# Patient Record
Sex: FEMALE | Race: WHITE | NOT HISPANIC OR LATINO | Employment: FULL TIME | URBAN - METROPOLITAN AREA
[De-identification: names, ages, dates, MRNs, and addresses within clinical notes are randomized per-mention and may not be internally consistent; named-entity substitution may affect disease eponyms.]

---

## 2017-01-11 DIAGNOSIS — F51.01 PRIMARY INSOMNIA: Primary | ICD-10-CM

## 2017-01-12 RX ORDER — ZOLPIDEM TARTRATE 5 MG/1
TABLET ORAL
Qty: 30 TABLET | Refills: 0 | OUTPATIENT
Start: 2017-01-12

## 2017-01-12 NOTE — TELEPHONE ENCOUNTER
Medication was refilled on 12/15/2016 #30 with 3 additional refills.    zolpidem (AMBIEN) 10 MG tablet 30 tablet 3 12/15/2016

## 2017-01-24 RX ORDER — ZOLPIDEM TARTRATE 10 MG/1
10 TABLET ORAL
Qty: 30 TABLET | Refills: 3 | Status: CANCELLED | OUTPATIENT
Start: 2017-01-24

## 2017-01-24 NOTE — TELEPHONE ENCOUNTER
Patient calling, not aware that her hard copy for Zolpidem dated 12/15/16 would be in her after visit summary papers and never even looked.  So she will look when she gets home and only call back if it is not there!  Will close this encounter.  Edith Hanson RN

## 2017-01-24 NOTE — TELEPHONE ENCOUNTER
Controlled substance request to refill Ambien that was given to her to fill on her office visit on 12/15/16.  It is not here, and the pharmacy does not have it and through checking  the last RX Ambien was filled 12/5/2016.  I left her a detailed message on her private VM to look through her papers (after visit jake papers) as it also had 5 refills on it and she probably has it.  Asked her to call back.  Awaiting her message.    Edith Hanson RN

## 2017-02-14 DIAGNOSIS — J30.2 SEASONAL ALLERGIC RHINITIS, UNSPECIFIED ALLERGIC RHINITIS TRIGGER: ICD-10-CM

## 2017-02-14 RX ORDER — MONTELUKAST SODIUM 10 MG/1
10 TABLET ORAL AT BEDTIME
Qty: 90 TABLET | Refills: 1 | Status: SHIPPED | OUTPATIENT
Start: 2017-02-14 | End: 2018-01-11

## 2017-02-14 NOTE — TELEPHONE ENCOUNTER
Pending Prescriptions:                       Disp   Refills    montelukast (SINGULAIR) 10 MG tablet      30 tab*1            Sig: Take 1 tablet (10 mg) by mouth At Bedtime          Last Written Prescription Date: 12/15/16  Last Fill Quantity: 30,  # refills: 1   Last Office Visit with [facility], [facility] or [facility] prescribing provider: 12/15/16

## 2017-02-14 NOTE — TELEPHONE ENCOUNTER
Prescription approved per [facility], [facility] or [facility] refill protocol.  Ava Moreau RN  [facility] [employer]

## 2017-12-22 DIAGNOSIS — Z30.9 ENCOUNTER FOR CONTRACEPTIVE MANAGEMENT: Primary | ICD-10-CM

## 2017-12-27 RX ORDER — LEVONORGESTREL/ETHIN.ESTRADIOL 0.1-0.02MG
1 TABLET ORAL DAILY
Qty: 84 TABLET | Refills: 0 | Status: SHIPPED | OUTPATIENT
Start: 2017-12-27 | End: 2018-01-11

## 2017-12-27 NOTE — TELEPHONE ENCOUNTER
Requested Prescriptions   Pending Prescriptions Disp Refills     levonorgestrel-ethinyl estradiol (AVIANE,KOURTENYE,LESSINA) 0.1-20 MG-MCG per tablet 84 tablet 3     Sig: Take 1 tablet by mouth daily continuously    Contraceptives Protocol Passed    12/22/2017  3:44 PM       Passed - Patient is not a current smoker if age is 35 or older       Passed - Recent or future visit with authorizing provider's specialty    Patient had office visit in the last year or has a visit in the next 30 days with authorizing provider.  See chart review.              Passed - No active pregnancy on record       Passed - No positive pregnancy test in past 12 months        Next 5 appointments (look out 90 days)     Jan 11, 2018  5:00 PM CST   [facility]YSICAL with [facility]olson MD   [facility]    [address] [facility] [address]   [phone]              Prescription approved per [facility] Refill Protocol.  Ling GUERRA RN

## 2018-01-11 ENCOUNTER — OFFICE VISIT (OUTPATIENT)
Dept: FAMILY MEDICINE | Facility: CLINIC | Age: 33
End: 2018-01-11
Payer: COMMERCIAL

## 2018-01-11 VITALS
WEIGHT: 119 LBS | DIASTOLIC BLOOD PRESSURE: 72 MMHG | BODY MASS INDEX: 20.32 KG/M2 | HEIGHT: 64 IN | SYSTOLIC BLOOD PRESSURE: 102 MMHG | OXYGEN SATURATION: 100 % | HEART RATE: 88 BPM | TEMPERATURE: 98.6 F

## 2018-01-11 DIAGNOSIS — Z30.41 ORAL CONTRACEPTIVE USE: ICD-10-CM

## 2018-01-11 DIAGNOSIS — Z00.00 ROUTINE GENERAL MEDICAL EXAMINATION AT A HEALTH CARE FACILITY: Primary | ICD-10-CM

## 2018-01-11 DIAGNOSIS — Z23 NEED FOR PROPHYLACTIC VACCINATION AND INOCULATION AGAINST INFLUENZA: ICD-10-CM

## 2018-01-11 DIAGNOSIS — J30.89 CHRONIC NON-SEASONAL ALLERGIC RHINITIS, UNSPECIFIED TRIGGER: ICD-10-CM

## 2018-01-11 DIAGNOSIS — Z71.84 TRAVEL ADVICE ENCOUNTER: ICD-10-CM

## 2018-01-11 DIAGNOSIS — G47.00 INSOMNIA, UNSPECIFIED TYPE: ICD-10-CM

## 2018-01-11 PROCEDURE — 99395 PREV VISIT EST AGE 18-39: CPT | Performed by: INTERNAL MEDICINE

## 2018-01-11 RX ORDER — IPRATROPIUM BROMIDE 42 UG/1
2 SPRAY, METERED NASAL 4 TIMES DAILY PRN
Qty: 1 BOX | Refills: 3 | Status: SHIPPED | OUTPATIENT
Start: 2018-01-11 | End: 2019-02-25

## 2018-01-11 RX ORDER — LEVONORGESTREL/ETHIN.ESTRADIOL 0.1-0.02MG
1 TABLET ORAL DAILY
Qty: 84 TABLET | Refills: 3 | Status: SHIPPED | OUTPATIENT
Start: 2018-01-11 | End: 2018-11-06

## 2018-01-11 RX ORDER — CIPROFLOXACIN 500 MG/1
500 TABLET, FILM COATED ORAL 2 TIMES DAILY
Qty: 20 TABLET | Refills: 0 | Status: SHIPPED | OUTPATIENT
Start: 2018-01-11 | End: 2019-02-25

## 2018-01-11 RX ORDER — ESZOPICLONE 2 MG/1
2 TABLET, FILM COATED ORAL
Qty: 30 TABLET | Refills: 1 | Status: SHIPPED | OUTPATIENT
Start: 2018-01-11 | End: 2019-02-25

## 2018-01-11 ASSESSMENT — ENCOUNTER SYMPTOMS
SORE THROAT: 0
HEADACHES: 1
NUMBNESS: 0
FEVER: 0
BACK PAIN: 0
HALLUCINATIONS: 0
PALPITATIONS: 0
LIGHT-HEADEDNESS: 0
ARTHRALGIAS: 0
SHORTNESS OF BREATH: 0
WEAKNESS: 0
DIFFICULTY URINATING: 0
DIZZINESS: 0
BLOOD IN STOOL: 0
SLEEP DISTURBANCE: 1
NERVOUS/ANXIOUS: 0
DIARRHEA: 0
EYE PAIN: 0
FATIGUE: 0
VOMITING: 0
TROUBLE SWALLOWING: 0
MYALGIAS: 0
CHILLS: 0
ABDOMINAL PAIN: 0
CONSTIPATION: 0
NAUSEA: 0
UNEXPECTED WEIGHT CHANGE: 0
COUGH: 0

## 2018-01-11 NOTE — PROGRESS NOTES
SUBJECTIVE:   CC: Aicha Armstrong is an 32 year old woman who presents for preventive health visit.       Patient complains of difficulties with sleep.  Has tried several over-the-counter remedies without relief.  Denies anxiety/depression or any pain/discomfort that disrupts her sleep.    Experiences chronic persistent nasal congestion.  No sinus pain at this time.    Also needs a refill for her oral contraceptive pills.    Planning to travel out of the country and is requesting for a prescription for an antibiotic.      Answers for HPI/ROS submitted by the patient on 1/8/2018   Annual Exam:  Getting at least 3 servings of Calcium per day:: Yes  Bi-annual eye exam:: Yes  Dental care twice a year:: Yes  Sleep apnea or symptoms of sleep apnea:: None  Diet:: Regular (no restrictions)  Frequency of exercise:: 2-3 days/week  Taking medications regularly:: Yes  Medication side effects:: None  Additional concerns today:: No  PHQ-2 Score: 0  Duration of exercise:: Greater than 60 minutes    Today's PHQ-2 Score:   PHQ-2 ( 1999 [employer]) 1/8/2018 12/12/2016   Q1: Little interest or pleasure in doing things 0 -   Q2: Feeling down, depressed or hopeless 0 -   PHQ-2 Score 0 -   Q1: Little interest or pleasure in doing things Not at all Not at all   Q2: Feeling down, depressed or hopeless Not at all Not at all   PHQ-2 Score 0 0       Abuse: Current or Past(Physical, Sexual or Emotional)- No  Do you feel safe in your environment - Yes  If you drink alcohol do you typically have >3 drinks per day or >7 drinks per week? No                     Reviewed orders with patient.  Reviewed health maintenance and updated orders accordingly - Yes    Pertinent mammograms are reviewed under the imaging tab.  History of abnormal Pap smear: NO - age 30- 65 PAP every 3 years recommended    Reviewed and updated as needed this visit by clinical staff  Tobacco  Allergies  Meds  Problems       Reviewed and updated as needed this visit by  Provider  Allergies  Meds  Problems        Past Medical History:   Diagnosis Date     Atypical squamous cells of undetermined significance (ASCUS) on Papanicolaou smear of cervix 12/2015    ASCUS cyto, repeat pap 1 yr       Past Surgical History:   Procedure Laterality Date     C APPENDECTOMY  1997    incidental with above     CHOLECYSTECTOMY, LAPOROSCOPIC  Feb 4 2010    Cholecystectomy, Laparoscopic     SURGICAL HISTORY OF -   1997    STOMACH SURGERY REMOVE HAIRBALL       Family History   Problem Relation Age of Onset     DIABETES Paternal Grandmother      C.A.D. Maternal Grandmother      C.A.D. Maternal Grandfather      C.A.D. Paternal Grandfather      Prostate Cancer Maternal Grandfather      Arthritis Father      onset 23- 24 yrs     Arthritis Paternal Grandmother      Arthritis Paternal Grandfather        Social History   Substance Use Topics     Smoking status: Never Smoker     Smokeless tobacco: Never Used     Alcohol use 0.0 oz/week     0 Standard drinks or equivalent per week      Comment: rarely       ROS:  Review of Systems   Constitutional: Negative for chills, fatigue, fever and unexpected weight change.   HENT: Positive for congestion. Negative for ear pain, hearing loss, sore throat and trouble swallowing.    Eyes: Negative for pain and visual disturbance.   Respiratory: Negative for cough and shortness of breath.    Cardiovascular: Negative for chest pain and palpitations.   Gastrointestinal: Negative for abdominal pain, blood in stool, constipation, diarrhea, nausea and vomiting.   Genitourinary: Negative for difficulty urinating.   Musculoskeletal: Negative for arthralgias, back pain and myalgias.   Skin: Negative for rash.   Neurological: Positive for headaches (migraine once a month; nothing severe; relieved by OTC Exedrin). Negative for dizziness, syncope, weakness, light-headedness and numbness.   Psychiatric/Behavioral: Positive for sleep disturbance. Negative for behavioral problems and  "hallucinations. The patient is not nervous/anxious.        OBJECTIVE:   /72  Pulse 88  Temp 98.6  F (37  C) (Oral)  Ht 5' 4\" (1.626 m)  Wt 119 lb (54 kg)  LMP 12/27/2017  SpO2 100%  Breastfeeding? No  BMI 20.43 kg/m2  EXAM:  Physical Exam   Constitutional: She is oriented to person, place, and time. No distress.   HENT:   Right Ear: External ear normal.   Left Ear: External ear normal.   Nose: Nose normal.   Mouth/Throat: Oropharynx is clear and moist.   Eyes: Conjunctivae are normal. Pupils are equal, round, and reactive to light.   Neck: Normal range of motion. Neck supple. No thyromegaly present.   Cardiovascular: Normal rate, regular rhythm and normal heart sounds.    Pulmonary/Chest: Effort normal and breath sounds normal. No respiratory distress.   Abdominal: Soft. There is no tenderness.   Musculoskeletal: Normal range of motion. She exhibits no edema or tenderness.   Lymphadenopathy:     She has no cervical adenopathy.   Neurological: She is alert and oriented to person, place, and time. Coordination normal.   Skin: No rash noted.   Psychiatric: She has a normal mood and affect. Judgment normal.   Vitals reviewed.      ASSESSMENT/PLAN:       ICD-10-CM    1. Routine general medical examination at a health care facility Z00.00    2. Insomnia, unspecified type G47.00 eszopiclone (LUNESTA) 2 MG tablet   3. Chronic non-seasonal allergic rhinitis, unspecified trigger J30.89 ipratropium (ATROVENT) 0.06 % spray   4. Oral contraceptive use Z30.41 levonorgestrel-ethinyl estradiol (AVIANE,ALESSE,LESSINA) 0.1-20 MG-MCG per tablet   5. Travel advice encounter Z71.89 ciprofloxacin (CIPRO) 500 MG tablet   6. Need for prophylactic vaccination and inoculation against influenza Z23      **please refer to HPI for status of conditions      Patient Instructions   Try Lunesta for you insomnia.  Call doctor if you develop any side effects from the medication or if it is ineffective.    Try Ipratropium nasal spray for " your chronic nasal congestion.  Call doctor if your congestion persist/worsens and we can consider referral to an allergy specialist.    May take Ciprofloxacin for traveller's diarrhea.  Also good for urinary infection.    Follow up yearly or as needed.      Preventive Health Recommendations  Female Ages 26 - 39  Yearly exam:   See your health care provider every year in order to    Review health changes.     Discuss preventive care.      Review your medicines if you your doctor has prescribed any.    Until age 30: Get a Pap test every three years (more often if you have had an abnormal result).    After age 30: Talk to your doctor about whether you should have a Pap test every 3 years or have a Pap test with HPV screening every 5 years.   You do not need a Pap test if your uterus was removed (hysterectomy) and you have not had cancer.  You should be tested each year for STDs (sexually transmitted diseases), if you're at risk.   Talk to your provider about how often to have your cholesterol checked.  If you are at risk for diabetes, you should have a diabetes test (fasting glucose).  Shots: Get a flu shot each year. Get a tetanus shot every 10 years.   Nutrition:     Eat at least 5 servings of fruits and vegetables each day.    Eat whole-grain bread, whole-wheat pasta and brown rice instead of white grains and rice.    Talk to your provider about Calcium and Vitamin D.     Lifestyle    Exercise at least 150 minutes a week (30 minutes a day, 5 days of the week). This will help you control your weight and prevent disease.    Limit alcohol to one drink per day.    No smoking.     Wear sunscreen to prevent skin cancer.    See your dentist every six months for an exam and cleaning.          COUNSELING:   Reviewed preventive health counseling, as reflected in patient instructions         reports that she has never smoked. She has never used smokeless tobacco.    Estimated body mass index is 20.43 kg/(m^2) as calculated  "from the following:    Height as of this encounter: 5' 4\" (1.626 m).    Weight as of this encounter: 119 lb (54 kg).       Counseling Resources:  ATP IV Guidelines  Pooled Cohorts Equation Calculator  Breast Cancer Risk Calculator  FRAX Risk Assessment  ICSI Preventive Guidelines  Dietary Guidelines for Americans, 2010  USDA's MyPlate  ASA Prophylaxis  Lung CA Screening    Chi Nicholson MD  [facility]  "

## 2018-01-11 NOTE — MR AVS SNAPSHOT
After Visit Summary   1/11/2018    Aicha Armstrong    MRN: [id number]           Patient Information     Date Of Birth          1985        Visit Information        Provider Department      1/11/2018 5:00 PM Chi Nicholson MD [facility]        Today's Diagnoses     Routine general medical examination at a health care facility    -  1    Oral contraceptive use        Insomnia, unspecified type        Chronic non-seasonal allergic rhinitis, unspecified trigger        Travel advice encounter        Need for prophylactic vaccination and inoculation against influenza          Care Instructions    Try Lunesta for you insomnia.  Call doctor if you develop any side effects from the medication or if it is ineffective.    Try Ipratropium nasal spray for your chronic nasal congestion.  Call doctor if your congestion persist/worsens and we can consider referral to an allergy specialist.    May take Ciprofloxacin for traveller's diarrhea.  Also good for urinary infection.    Follow up yearly or as needed.      Preventive Health Recommendations  Female Ages 26 - 39  Yearly exam:   See your health care provider every year in order to    Review health changes.     Discuss preventive care.      Review your medicines if you your doctor has prescribed any.    Until age 30: Get a Pap test every three years (more often if you have had an abnormal result).    After age 30: Talk to your doctor about whether you should have a Pap test every 3 years or have a Pap test with HPV screening every 5 years.   You do not need a Pap test if your uterus was removed (hysterectomy) and you have not had cancer.  You should be tested each year for STDs (sexually transmitted diseases), if you're at risk.   Talk to your provider about how often to have your cholesterol checked.  If you are at risk for diabetes, you should have a diabetes test (fasting glucose).  Shots: Get a flu shot each year. Get a  tetanus shot every 10 years.   Nutrition:     Eat at least 5 servings of fruits and vegetables each day.    Eat whole-grain bread, whole-wheat pasta and brown rice instead of white grains and rice.    Talk to your provider about Calcium and Vitamin D.     Lifestyle    Exercise at least 150 minutes a week (30 minutes a day, 5 days of the week). This will help you control your weight and prevent disease.    Limit alcohol to one drink per day.    No smoking.     Wear sunscreen to prevent skin cancer.    See your dentist every six months for an exam and cleaning.            Follow-ups after your visit        Who to contact     If you have questions or need follow up information about today's clinic visit or your schedule please contact [facility] directly at [phone].  Normal or non-critical lab and imaging results will be communicated to you by MyChart, letter or phone within 4 business days after the clinic has received the results. If you do not hear from us within 7 days, please contact the clinic through [employer] or phone. If you have a critical or abnormal lab result, we will notify you by phone as soon as possible.  Submit refill requests through [employer] or call your pharmacy and they will forward the refill request to us. Please allow 3 business days for your refill to be completed.          Additional Information About Your Visit        [employer] Information     [employer] gives you secure access to your electronic health record. If you see a primary care provider, you can also send messages to your care team and make appointments. If you have questions, please call your primary care clinic.  If you do not have a primary care provider, please call [phone] and they will assist you.        Care EveryWhere ID     This is your Care EveryWhere ID. This could be used by other organizations to access your [facility] medical records  [id number]        Your Vitals Were     Pulse Temperature Height Last  "Period Pulse Oximetry Breastfeeding?    88 98.6  F (37  C) (Oral) 5' 4\" (1.626 m) 12/27/2017 100% No    BMI (Body Mass Index)                   20.43 kg/m2            Blood Pressure from Last 3 Encounters:   01/11/18 102/72   12/15/16 113/69   12/03/15 110/68    Weight from Last 3 Encounters:   01/11/18 119 lb (54 kg)   12/15/16 122 lb (55.3 kg)   12/03/15 119 lb (54 kg)              Today, you had the following     No orders found for display         Today's Medication Changes          These changes are accurate as of: 1/11/18  5:50 PM.  If you have any questions, ask your nurse or doctor.               Start taking these medicines.        Dose/Directions    ciprofloxacin 500 MG tablet   Commonly known as:  CIPRO   Used for:  Travel advice encounter   Started by:  Chi Nicholson MD        Dose:  500 mg   Take 1 tablet (500 mg) by mouth 2 times daily   Quantity:  20 tablet   Refills:  0       eszopiclone 2 MG tablet   Commonly known as:  LUNESTA   Used for:  Insomnia, unspecified type   Started by:  Chi Nicholson MD        Dose:  2 mg   Take 1 tablet (2 mg) by mouth nightly as needed for sleep   Quantity:  30 tablet   Refills:  1       ipratropium 0.06 % spray   Commonly known as:  ATROVENT   Used for:  Chronic non-seasonal allergic rhinitis, unspecified trigger   Started by:  Chi Nicholson MD        Dose:  2 spray   Spray 2 sprays into both nostrils 4 times daily as needed for rhinitis   Quantity:  1 Box   Refills:  3         Stop taking these medicines if you haven't already. Please contact your care team if you have questions.     montelukast 10 MG tablet   Commonly known as:  SINGULAIR   Stopped by:  Chi Nicholson MD           zolpidem 10 MG tablet   Commonly known as:  AMBIEN   Stopped by:  Chi Nicholson MD                Where to get your medicines      These medications were sent to [id number] - [place], [place] - [address]  [address], [place] [address]     Phone:  [phone]     ciprofloxacin 500 MG tablet    ipratropium 0.06 % spray    levonorgestrel-ethinyl estradiol 0.1-20 MG-MCG per tablet         Some of these will need a paper prescription and others can be bought over the counter.  Ask your nurse if you have questions.     Bring a paper prescription for each of these medications     eszopiclone 2 MG tablet                Primary Care Provider Office Phone # Fax #    Chi Subhash Nicholson [phone] [address] [facility] [address]  [facility] [address]        Equal Access to Services     CRISTOBAL APARICIO : Curry Carranza, wajuliet dallas, qaybta kaalmada danay, fletcher mendoza . So [facility] [phone].    ATENCIÓN: Si habla español, tiene a nava disposición servicios gratuitos de asistencia lingüística. Llame al [phone].    We comply with applicable federal civil rights laws and [place] laws. We do not discriminate on the basis of race, color, national origin, age, disability, sex, sexual orientation, or gender identity.            Thank you!     Thank you for choosing [facility]  for your care. Our goal is always to provide you with excellent care. Hearing back from our patients is one way we can continue to improve our services. Please take a few minutes to complete the written survey that you may receive in the mail after your visit with us. Thank you!             Your Updated Medication List - Protect others around you: Learn how to safely use, store and throw away your medicines at www.disposemymeds.org.          This list is accurate as of: 1/11/18  5:50 PM.  Always use your most recent med list.                   Brand Name Dispense Instructions for use Diagnosis    ciprofloxacin 500 MG tablet    CIPRO    20 tablet    Take 1 tablet (500 mg) by mouth 2 times daily    Travel advice encounter       DAILY  MULTIVITAMIN PO      Take  by mouth daily.        eszopiclone 2 MG tablet    LUNESTA    30 tablet    Take 1 tablet (2 mg) by mouth nightly as needed for sleep    Insomnia, unspecified type       ipratropium 0.06 % spray    ATROVENT    1 Box    Spray 2 sprays into both nostrils 4 times daily as needed for rhinitis    Chronic non-seasonal allergic rhinitis, unspecified trigger       levonorgestrel-ethinyl estradiol 0.1-20 MG-MCG per tablet    AVIANE,ALESSE,LESSINA    84 tablet    Take 1 tablet by mouth daily continuously    Oral contraceptive use

## 2018-01-11 NOTE — PATIENT INSTRUCTIONS
Try Lunesta for you insomnia.  Call doctor if you develop any side effects from the medication or if it is ineffective.    Try Ipratropium nasal spray for your chronic nasal congestion.  Call doctor if your congestion persist/worsens and we can consider referral to an allergy specialist.    May take Ciprofloxacin for traveller's diarrhea.  Also good for urinary infection.    Follow up yearly or as needed.      Preventive Health Recommendations  Female Ages 26 - 39  Yearly exam:   See your health care provider every year in order to    Review health changes.     Discuss preventive care.      Review your medicines if you your doctor has prescribed any.    Until age 30: Get a Pap test every three years (more often if you have had an abnormal result).    After age 30: Talk to your doctor about whether you should have a Pap test every 3 years or have a Pap test with HPV screening every 5 years.   You do not need a Pap test if your uterus was removed (hysterectomy) and you have not had cancer.  You should be tested each year for STDs (sexually transmitted diseases), if you're at risk.   Talk to your provider about how often to have your cholesterol checked.  If you are at risk for diabetes, you should have a diabetes test (fasting glucose).  Shots: Get a flu shot each year. Get a tetanus shot every 10 years.   Nutrition:     Eat at least 5 servings of fruits and vegetables each day.    Eat whole-grain bread, whole-wheat pasta and brown rice instead of white grains and rice.    Talk to your provider about Calcium and Vitamin D.     Lifestyle    Exercise at least 150 minutes a week (30 minutes a day, 5 days of the week). This will help you control your weight and prevent disease.    Limit alcohol to one drink per day.    No smoking.     Wear sunscreen to prevent skin cancer.    See your dentist every six months for an exam and cleaning.

## 2018-11-06 DIAGNOSIS — Z30.41 ORAL CONTRACEPTIVE USE: ICD-10-CM

## 2018-11-06 NOTE — TELEPHONE ENCOUNTER
"Last Written Prescription Date:  1/11/18  Last Fill Quantity: 84 tablet,  # refills: 3   Last office visit: 1/11/2018 with prescribing provider:  Bharat   Future Office Visit:      Requested Prescriptions   Pending Prescriptions Disp Refills     levonorgestrel-ethinyl estradiol (AVIANE,ALESSE,LESSINA) 0.1-20 MG-MCG per tablet 84 tablet 0     Sig: Take 1 tablet by mouth daily Continuously - Yearly physical due in January for additional refills    Contraceptives Protocol Passed    11/6/2018 10:05 AM       Passed - Patient is not a current smoker if age is 35 or older       Passed - Recent (12 mo) or future (30 days) visit within the authorizing provider's specialty    Patient had office visit in the last 12 months or has a visit in the next 30 days with authorizing provider or within the authorizing provider's specialty.  See \"Patient Info\" tab in inbasket, or \"Choose Columns\" in Meds & Orders section of the refill encounter.             Passed - No active pregnancy on record       Passed - No positive pregnancy test in past 12 months          "

## 2018-11-06 NOTE — TELEPHONE ENCOUNTER
Patient takes oral contraception pills daily.  Refill needed.  Physical due in January, SIG & Pharm note given.    Radha Nguyen, RT (R)

## 2018-11-07 RX ORDER — LEVONORGESTREL/ETHIN.ESTRADIOL 0.1-0.02MG
1 TABLET ORAL DAILY
Qty: 84 TABLET | Refills: 0 | Status: SHIPPED | OUTPATIENT
Start: 2018-11-07 | End: 2019-02-25

## 2018-11-07 NOTE — TELEPHONE ENCOUNTER
Pt is due for annual OV. Refilled #84 with note to pharmacy to inform patient to schedule an appointment for January.     Debi HERNANDEZ RN

## 2019-02-25 ENCOUNTER — OFFICE VISIT (OUTPATIENT)
Dept: FAMILY MEDICINE | Facility: CLINIC | Age: 34
End: 2019-02-25
Payer: COMMERCIAL

## 2019-02-25 VITALS
TEMPERATURE: 98 F | SYSTOLIC BLOOD PRESSURE: 105 MMHG | BODY MASS INDEX: 21.68 KG/M2 | WEIGHT: 127 LBS | HEIGHT: 64 IN | HEART RATE: 73 BPM | DIASTOLIC BLOOD PRESSURE: 72 MMHG | OXYGEN SATURATION: 98 %

## 2019-02-25 DIAGNOSIS — G89.29 CHRONIC PAIN OF BOTH KNEES: ICD-10-CM

## 2019-02-25 DIAGNOSIS — Z00.00 ROUTINE HISTORY AND PHYSICAL EXAMINATION OF ADULT: Primary | ICD-10-CM

## 2019-02-25 DIAGNOSIS — M25.531 PAIN IN BOTH WRISTS: ICD-10-CM

## 2019-02-25 DIAGNOSIS — Z30.41 ORAL CONTRACEPTIVE USE: ICD-10-CM

## 2019-02-25 DIAGNOSIS — M25.562 CHRONIC PAIN OF BOTH KNEES: ICD-10-CM

## 2019-02-25 DIAGNOSIS — M25.561 CHRONIC PAIN OF BOTH KNEES: ICD-10-CM

## 2019-02-25 DIAGNOSIS — M25.532 PAIN IN BOTH WRISTS: ICD-10-CM

## 2019-02-25 PROCEDURE — 99395 PREV VISIT EST AGE 18-39: CPT | Performed by: INTERNAL MEDICINE

## 2019-02-25 RX ORDER — LEVONORGESTREL/ETHIN.ESTRADIOL 0.1-0.02MG
1 TABLET ORAL DAILY
Qty: 84 TABLET | Refills: 3 | Status: SHIPPED | OUTPATIENT
Start: 2019-02-25 | End: 2019-11-12

## 2019-02-25 ASSESSMENT — ENCOUNTER SYMPTOMS
COUGH: 0
HEADACHES: 1
SINUS PAIN: 0
CONSTIPATION: 0
WEAKNESS: 0
HEMATOCHEZIA: 0
NERVOUS/ANXIOUS: 0
HEMATURIA: 0
SINUS PRESSURE: 0
FREQUENCY: 0
SORE THROAT: 0
SHORTNESS OF BREATH: 0
CHILLS: 0
JOINT SWELLING: 0
ARTHRALGIAS: 1
FEVER: 0
EYE PAIN: 0
ABDOMINAL PAIN: 0
PALPITATIONS: 0
RHINORRHEA: 0
SLEEP DISTURBANCE: 1
MYALGIAS: 0
DIZZINESS: 0
DIARRHEA: 0
HEARTBURN: 0
DYSURIA: 0
PARESTHESIAS: 0
NAUSEA: 0
BREAST MASS: 0

## 2019-02-25 ASSESSMENT — MIFFLIN-ST. JEOR: SCORE: 1276.07

## 2019-02-25 NOTE — PROGRESS NOTES
SUBJECTIVE:   CC: Aicha Armstrong is an 33 year old male who presents for preventative health visit.       Patient has been experiencing chronic daily pain at her knees and wrists.  Knee pain is typically precipitated by stair climbing.  Has not noticed any swelling or redness.  No recollection of trauma to her knees (although she played soccer when she was younger).  She also does a lot of typing at work and this precipitates her wrist pain.  Denies associated numbness/tingling.    No other subjective complaints presented.      Physical   Annual:     Getting at least 3 servings of Calcium per day:  Yes    Bi-annual eye exam:  Yes    Dental care twice a year:  Yes    Sleep apnea or symptoms of sleep apnea:  Daytime drowsiness    Diet:  Regular (no restrictions)    Frequency of exercise:  2-3 days/week    Duration of exercise:  Greater than 60 minutes    Taking medications regularly:  Yes    Medication side effects:  None    Additional concerns today:  No    PHQ-2 Total Score: 0      Today's PHQ-2 Score:   PHQ-2 ( 1999 Pfizer) 2/25/2019   Q1: Little interest or pleasure in doing things 0   Q2: Feeling down, depressed or hopeless 0   PHQ-2 Score 0   Q1: Little interest or pleasure in doing things -   Q2: Feeling down, depressed or hopeless -   PHQ-2 Score -       Abuse: Current or Past(Physical, Sexual or Emotional)- No  Do you feel safe in your environment? Yes    Social History     Tobacco Use     Smoking status: Never Smoker     Smokeless tobacco: Never Used   Substance Use Topics     Alcohol use: Yes     Alcohol/week: 0.0 oz     Comment: rarely     Alcohol Use 2/22/2019   If you drink alcohol do you typically have greater than 3 drinks per day OR greater than 7 drinks per week? No       Reviewed orders with patient. Reviewed health maintenance and updated orders accordingly - Yes      Reviewed and updated as needed this visit by clinical staff  Tobacco  Allergies  Meds  Problems  Med Hx  Surg Hx  Fam Hx   Soc Hx          Reviewed and updated as needed this visit by Provider  Allergies  Meds  Problems  Med Hx  Surg Hx        Past Medical History:   Diagnosis Date     Atypical squamous cells of undetermined significance (ASCUS) on Papanicolaou smear of cervix 12/2015    ASCUS cyto, repeat pap 1 yr       Past Surgical History:   Procedure Laterality Date     C APPENDECTOMY  1997    incidental with above     CHOLECYSTECTOMY, LAPOROSCOPIC  Feb 4 2010    Cholecystectomy, Laparoscopic     SURGICAL HISTORY OF -   1997    STOMACH SURGERY REMOVE HAIRBALL       Family History   Problem Relation Age of Onset     Arthritis Father         onset 23- 24 yrs     Diabetes Paternal Grandmother      Arthritis Paternal Grandmother      C.A.D. Maternal Grandmother      C.A.D. Maternal Grandfather      Prostate Cancer Maternal Grandfather      C.A.D. Paternal Grandfather      Arthritis Paternal Grandfather        Social History     Tobacco Use     Smoking status: Never Smoker     Smokeless tobacco: Never Used   Substance Use Topics     Alcohol use: Yes     Alcohol/week: 0.0 oz     Comment: rarely       Review of Systems   Constitutional: Negative for chills and fever.   HENT: Positive for congestion (during winter -- I rx'd ipratropium nasal spray but it gave the patient epistaxis). Negative for ear pain, hearing loss, rhinorrhea, sinus pressure, sinus pain and sore throat.    Eyes: Negative for pain and visual disturbance.   Respiratory: Negative for cough and shortness of breath.    Cardiovascular: Negative for chest pain, palpitations and peripheral edema.   Gastrointestinal: Negative for abdominal pain, constipation, diarrhea, heartburn, hematochezia and nausea.   Breasts:  Negative for tenderness, breast mass and discharge.   Genitourinary: Negative for dysuria, frequency, genital sores, hematuria, pelvic pain, urgency, vaginal bleeding and vaginal discharge.   Musculoskeletal: Positive for arthralgias. Negative for joint  "swelling and myalgias.   Skin: Negative for rash.   Neurological: Positive for headaches (from migraines, occurs closer to her periods, also triggered by lack of sleep, relieved by OTC Exedrin, getting less frequent and less severe). Negative for dizziness, weakness and paresthesias.   Psychiatric/Behavioral: Positive for sleep disturbance (both with sleep onset and maintenance -- has tried Ambien and Lunesta without any improvement, OTC Unisom provides some relief). Negative for mood changes. The patient is not nervous/anxious.        OBJECTIVE:   /72 (BP Location: Left arm, Patient Position: Chair, Cuff Size: Adult Large)   Pulse 73   Temp 98  F (36.7  C) (Oral)   Ht 1.626 m (5' 4\")   Wt 57.6 kg (127 lb)   SpO2 98%   Breastfeeding? No   BMI 21.80 kg/m      Physical Exam   Constitutional: She is oriented to person, place, and time. She appears well-developed and well-nourished. No distress.   HENT:   Right Ear: Tympanic membrane and external ear normal.   Left Ear: Tympanic membrane and external ear normal.   Nose: Nose normal.   Mouth/Throat: Oropharynx is clear and moist. No oropharyngeal exudate.   Eyes: Conjunctivae are normal. Pupils are equal, round, and reactive to light. Right eye exhibits no discharge. Left eye exhibits no discharge.   Neck: Neck supple. No tracheal deviation present. No thyromegaly present.   Cardiovascular: Normal rate, regular rhythm, S1 normal, S2 normal, normal heart sounds and normal pulses. Exam reveals no S3, no S4 and no friction rub.   No murmur heard.  Pulmonary/Chest: Effort normal and breath sounds normal. No respiratory distress. She has no wheezes. She has no rales.   Abdominal: Soft. Bowel sounds are normal. She exhibits no mass. There is no hepatosplenomegaly. There is no tenderness.   Musculoskeletal: Normal range of motion. She exhibits no edema.   Lymphadenopathy:     She has no cervical adenopathy.   Neurological: She is alert and oriented to person, " place, and time. She has normal strength and normal reflexes. She exhibits normal muscle tone.   Skin: Skin is warm and dry. No rash noted.   Psychiatric: She has a normal mood and affect. Judgment and thought content normal. Cognition and memory are normal.       ASSESSMENT/PLAN:       ICD-10-CM    1. Routine history and physical examination of adult Z00.00    2. Chronic pain of both knees M25.561 [facility] ADULT REFERRAL    M25.562     G89.29    3. Pain in both wrists M25.531 ORTH[facility] ADULT REFERRAL    M25.532    4. Oral contraceptive use Z30.41 levonorgestrel-ethinyl estradiol (AVIANE/ALESSE/LESSINA) 0.1-20 MG-MCG tablet       Patient Instructions   Follow up with Dr. Delgadillo of Orthopedics.    Maintain low fat/calorie diet and regular exercise.    I value your experience during your clinic visits and would be very thankful for your time with providing good feedback on clinic surveys.  Kindly let me know personally if your experience today was not exceptional so that we can continue to improve your care delivery experience.    Follow up yearly or as needed.        Preventive Health Recommendations  Female Ages 26 - 39  Yearly exam:   See your health care provider every year in order to    Review health changes.     Discuss preventive care.      Review your medicines if you your doctor has prescribed any.    Until age 30: Get a Pap test every three years (more often if you have had an abnormal result).    After age 30: Talk to your doctor about whether you should have a Pap test every 3 years or have a Pap test with HPV screening every 5 years.   You do not need a Pap test if your uterus was removed (hysterectomy) and you have not had cancer.  You should be tested each year for STDs (sexually transmitted diseases), if you're at risk.   Talk to your provider about how often to have your cholesterol checked.  If you are at risk for diabetes, you should have a diabetes test (fasting glucose).  Shots: Get a flu  "shot each year. Get a tetanus shot every 10 years.   Nutrition:     Eat at least 5 servings of fruits and vegetables each day.    Eat whole-grain bread, whole-wheat pasta and brown rice instead of white grains and rice.    Get adequate Calcium and Vitamin D.     Lifestyle    Exercise at least 150 minutes a week (30 minutes a day, 5 days of the week). This will help you control your weight and prevent disease.    Limit alcohol to one drink per day.    No smoking.     Wear sunscreen to prevent skin cancer.    See your dentist every six months for an exam and cleaning.        COUNSELING:   Reviewed preventive health counseling, as reflected in patient instructions    BP Readings from Last 1 Encounters:   02/25/19 105/72     Estimated body mass index is 21.8 kg/m  as calculated from the following:    Height as of this encounter: 1.626 m (5' 4\").    Weight as of this encounter: 57.6 kg (127 lb).       reports that  has never smoked. she has never used smokeless tobacco.      Counseling Resources:  ATP IV Guidelines  Pooled Cohorts Equation Calculator  FRAX Risk Assessment  ICSI Preventive Guidelines  Dietary Guidelines for Americans, 2010  USDA's MyPlate  ASA Prophylaxis  Lung CA Screening    Chi Nicholson MD  [facility]  "

## 2019-02-25 NOTE — PATIENT INSTRUCTIONS
Follow up with Dr. Delgadillo of Orthopedics.    Maintain low fat/calorie diet and regular exercise.    I value your experience during your clinic visits and would be very thankful for your time with providing good feedback on clinic surveys.  Kindly let me know personally if your experience today was not exceptional so that we can continue to improve your care delivery experience.    Follow up yearly or as needed.        Preventive Health Recommendations  Female Ages 26 - 39  Yearly exam:   See your health care provider every year in order to    Review health changes.     Discuss preventive care.      Review your medicines if you your doctor has prescribed any.    Until age 30: Get a Pap test every three years (more often if you have had an abnormal result).    After age 30: Talk to your doctor about whether you should have a Pap test every 3 years or have a Pap test with HPV screening every 5 years.   You do not need a Pap test if your uterus was removed (hysterectomy) and you have not had cancer.  You should be tested each year for STDs (sexually transmitted diseases), if you're at risk.   Talk to your provider about how often to have your cholesterol checked.  If you are at risk for diabetes, you should have a diabetes test (fasting glucose).  Shots: Get a flu shot each year. Get a tetanus shot every 10 years.   Nutrition:     Eat at least 5 servings of fruits and vegetables each day.    Eat whole-grain bread, whole-wheat pasta and brown rice instead of white grains and rice.    Get adequate Calcium and Vitamin D.     Lifestyle    Exercise at least 150 minutes a week (30 minutes a day, 5 days of the week). This will help you control your weight and prevent disease.    Limit alcohol to one drink per day.    No smoking.     Wear sunscreen to prevent skin cancer.    See your dentist every six months for an exam and cleaning.

## 2019-02-25 NOTE — PROGRESS NOTES
[facility]   Clinic Visit s Feb 27, 2019    PCP: Chi Nicholson Raudel Holcomb is a 33 year old female who is seen as self referral for   Chief Complaint   Patient presents with     Left Knee - Pain     Right Knee - Pain     Left Wrist - Pain     Right Wrist - Pain       Injury: Reports insidious onset without acute precipitating event.     Location of Pain: bilateral knee anterior , nonradiating   Duration of Pain: 2 year(s)  Rating of Pain at worst: 8/10  Rating of Pain Currently: 4/10  Pain is better with: ibuprofen   Pain is worse with: running, prolonged sitting and stair climbing  Treatment so far consists of: ibuprofen  Associated symptoms: no distal numbness or tingling; denies swelling or warmth  Recent imaging completed: No recent imaging completed.  Prior History of related problems: family history of OA      The patient also has bilateral wrist pain. Today she has a cyst on the dorsal aspect of her left wrist. She has also noticed similar symptoms on her right wrist that come and go. She reports that the wrist pain interferes with ADLs. She is LHD. Denies numbness, tingling or other concerning symptoms.     Never red swollen or warm joints.     Social History: is employed as a/an       Past Medical History:   Diagnosis Date     Atypical squamous cells of undetermined significance (ASCUS) on Papanicolaou smear of cervix 12/2015    ASCUS cyto, repeat pap 1 yr       Patient Active Problem List    Diagnosis Date Noted     Routine history and physical examination of adult 02/25/2019     Priority: Medium     Atypical squamous cells of undetermined significance (ASCUS) on Papanicolaou smear of cervix 12/01/2015     Priority: Medium     12/2015 ASCUS cyto, repeat pap 1 yr  12/15/16 Pap= Normal. Routine screening per guidelines       Migraine headache 04/25/2011     Priority: Medium     Insomnia 12/15/2010     Priority: Medium     ambien- felt  "groggy  Amitriptyline- did not like   Trazodone- no help     Melatonin does not help        CARDIOVASCULAR SCREENING; LDL GOAL LESS THAN 160 10/31/2010     Priority: Medium       Family History   Problem Relation Age of Onset     Arthritis Father         onset 23- 24 yrs     Diabetes Paternal Grandmother      Arthritis Paternal Grandmother      C.A.D. Maternal Grandmother      C.A.D. Maternal Grandfather      Prostate Cancer Maternal Grandfather      C.A.D. Paternal Grandfather      Arthritis Paternal Grandfather        Social History     Socioeconomic History     Marital status: Single     Spouse name: Not on file     Number of children: Not on file     Years of education: Not on file     Highest education level: Not on file   Occupational History     Occupation: college student at  [employer]      Employer: [employer]   Social Needs     Financial resource strain: Not on file     Food insecurity:     Worry: Not on file     Inability: Not on file     Transportation needs:     Medical: Not on file     Non-medical: Not on file   Tobacco Use     Smoking status: Never Smoker     Smokeless tobacco: Never Used   Substance and Sexual Activity     Alcohol use: Yes     Alcohol/week: 0.0 oz     Comment: rarely       Past Surgical History:   Procedure Laterality Date     C APPENDECTOMY  1997    incidental with above     CHOLECYSTECTOMY, LAPOROSCOPIC  Feb 4 2010    Cholecystectomy, Laparoscopic     SURGICAL HISTORY OF -   1997    STOMACH SURGERY REMOVE HAIRBALL           Review of Systems   Constitutional: Negative for malaise/fatigue.   Eyes: Negative for redness.   Musculoskeletal: Positive for joint pain.   Skin: Negative for rash.   All other systems reviewed and are negative.        Physical Exam   Musculoskeletal:        Right knee: She exhibits no effusion.        Left knee: She exhibits no effusion.     /72   Ht 1.626 m (5' 4\")   Wt 57.6 kg (127 lb)   BMI 21.80 kg/m    Constitutional:well-developed, " well-nourished, and in no distress.   Cardiovascular: Intact distal pulses.    Neurological: alert. Gait Normal:   Gait, station, stance, and balance appear normal for age  Skin: Skin is warm and dry.   Psychiatric: Mood and affect normal.   Respiratory: unlabored, speaks in full sentences  Lymph: no LAD, no lymphangitis            Right Knee Exam     Muscle Strength   The patient has normal right knee strength.    Tenderness   Right knee tenderness location: patella facets.    Range of Motion   Extension: normal   Flexion: normal     Tests   Nadine:  Medial - negative Lateral - negative  Varus: negative Valgus: negative  Lachman:  Anterior - negative      Drawer:  Anterior - negative    Posterior - negative  Patellar apprehension: negative    Other   Erythema: absent  Scars: absent  Sensation: normal  Pulse: present  Swelling: none  Effusion: no effusion present      Left Knee Exam     Muscle Strength   The patient has normal left knee strength.    Tenderness   Left knee tenderness location: patella facets.    Range of Motion   Extension: normal   Flexion: normal     Tests   Nadine:  Medial - negative Lateral - negative  Varus: negative Valgus: negative  Lachman:  Anterior - negative      Drawer:  Anterior - negative     Posterior - negative  Patellar apprehension: negative    Other   Erythema: absent  Scars: absent  Sensation: normal  Pulse: present  Swelling: none  Effusion: no effusion present      Right Hip Exam     Tenderness   The patient is experiencing no tenderness.     Muscle Strength   Abduction: 4/5   Adduction: 5/5   Flexion: 5/5       Left Hip Exam     Tenderness   The patient is experiencing no tenderness.     Muscle Strength   Abduction: 4/5   Adduction: 5/5   Flexion: 5/5       Right Hand Exam     Tenderness   The patient is experiencing no tenderness.     Range of Motion   Wrist   Extension: normal   Flexion: normal   Pronation: normal   Supination: normal     Muscle Strength   The patient has  normal right wrist strength.    Other   Erythema: absent  Scars: absent  Sensation: normal  Pulse: present      Left Hand Exam     Tenderness   The patient is experiencing no tenderness.     Range of Motion   Wrist   Extension: normal   Flexion: normal   Pronation: normal   Supination: normal     Muscle Strength   The patient has normal left wrist strength.    Other   Erythema: absent  Scars: absent  Sensation: normal  Pulse: present             X-ray images Ordered and independently reviewed by me in the office today with the patient. X-ray shows:     Recent Results (from the past 744 hour(s))   XR Knee Bilateral 3 Views    Narrative    2/27/2019    No fracture, dislocation and or lesion. Normal alignment.  Joint space   maintained no significant arthritis. No appreciable soft tissue   abnormality       ASSESSMENT/PLAN    ICD-10-CM    1. Patellofemoral pain syndrome of both knees M22.2X1 [facility]    M22.2X2    2. Chronic pain of both knees M25.561 XR Knee Bilateral 3 Views    M25.562     G89.29    3. Weakness of both hips R29.898 [facility]   4. Ganglion cyst of wrist, left M67.432    5. Bilateral wrist pain M25.531     M25.532        Explained nature of syndrome as a patella maltracking issue, now seemingly more related to poor hip mechanics affecting the biomechanical function of the entire leg, leaving the patella to move in an inappropriate or dysfunctional manner in the patellar groove. Physical therapy aimed at restoring correct patella tracking by restoring overall normal leg function recommended.  This includes avoidance of quad exercises especially leg extensions and emphasis on hip abductor and extensor strengthening. Noted mechanism for hip instability causing altered patellofemoral mechanics.  Recheck 4-6 weeks if not better.      Benign wrist exam except for LEFT dorsal ganglion cyst. discussed benign nature of wrist ganglion cysts.  If  persistent or bothersome, multiple options exist including bracing, hand therapy, rest, aspiration and injection, surgery. Noted recurrence of cysts with aspiration and injection, estimated at 90% for dorsal side and 50% for ulnar side.      Advised on keeping neutral wrist position, avoiding deep wrist extension. Offered hand therapy; pt declined, may call if desired.

## 2019-02-25 NOTE — PROGRESS NOTES
"   SUBJECTIVE:   CC: Aicha Armstrong is an 33 year old woman who presents for preventive health visit.     Healthy Habits:    Do you get at least three servings of calcium containing foods daily (dairy, green leafy vegetables, etc.)? { :749062::\"yes\"}    Amount of exercise or daily activities, outside of work: { :507985}    Problems taking medications regularly { :860544::\"No\"}    Medication side effects: { :461230::\"No\"}    Have you had an eye exam in the past two years? { :088734}    Do you see a dentist twice per year? { :911818}    Do you have sleep apnea, excessive snoring or daytime drowsiness?{ :353787}  {Outside tests to abstract? :705139}    {additional problems to add (Optional):528113}    Today's PHQ-2 Score:   PHQ-2 ( 1999 Pfizer) 2/22/2019 1/8/2018   Q1: Little interest or pleasure in doing things 0 0   Q2: Feeling down, depressed or hopeless 0 0   PHQ-2 Score 0 0   Q1: Little interest or pleasure in doing things Not at all Not at all   Q2: Feeling down, depressed or hopeless Not at all Not at all   PHQ-2 Score 0 0     {PHQ-2 LOOK IN ASSESSMENTS (Optional) :130024}  Abuse: Current or Past(Physical, Sexual or Emotional)- {YES/NO/NA:605044}  Do you feel safe in your environment? {YES/NO/NA:579826}    Social History     Tobacco Use     Smoking status: Never Smoker     Smokeless tobacco: Never Used   Substance Use Topics     Alcohol use: Yes     Alcohol/week: 0.0 oz     Comment: rarely     If you drink alcohol do you typically have >3 drinks per day or >7 drinks per week? {ETOH :221612}                     Reviewed orders with patient.  Reviewed health maintenance and updated orders accordingly - {Yes/No:927341::\"Yes\"}  {Chronicprobdata (Optional):449905}    {Mammo Decision Support (Optional):883830}    Pertinent mammograms are reviewed under the imaging tab.  History of abnormal Pap smear: {PAP HX:684956}  PAP / HPV 12/15/2016 12/3/2015 11/21/2012   PAP NIL ASC-US(A) NIL     Reviewed and updated as " "needed this visit by clinical staff         Reviewed and updated as needed this visit by Provider        {HISTORY OPTIONS (Optional):951144}    ROS:  { :150299}    OBJECTIVE:   There were no vitals taken for this visit.  EXAM:  {Exam Choices:919481}    {Diagnostic Test Results (Optional):088420::\"Diagnostic Test Results:\",\"none \"}    ASSESSMENT/PLAN:   {Diag Picklist:548838}    COUNSELING:   {FEMALE COUNSELING MESSAGES:023258::\"Reviewed preventive health counseling, as reflected in patient instructions\"}    BP Readings from Last 1 Encounters:   01/11/18 102/72     Estimated body mass index is 20.43 kg/m  as calculated from the following:    Height as of 1/11/18: 1.626 m (5' 4\").    Weight as of 1/11/18: 54 kg (119 lb).    {BP Counseling- Complete if BP >= 120/80  (Optional):309627}  {Weight Management Plan (ACO) Complete if BMI is abnormal-  Ages 18-64  BMI >24.9.  Age 65+ with BMI <23 or >30 (Optional):765612}     reports that  has never smoked. she has never used smokeless tobacco.  {Tobacco Cessation -- Complete if patient is a smoker (Optional):555997}    Counseling Resources:  ATP IV Guidelines  Pooled Cohorts Equation Calculator  Breast Cancer Risk Calculator  FRAX Risk Assessment  ICSI Preventive Guidelines  Dietary Guidelines for Americans, 2010  USDA's MyPlate  ASA Prophylaxis  Lung CA Screening    Chi Nicholson MD  [facility]  "

## 2019-02-27 ENCOUNTER — ANCILLARY PROCEDURE (OUTPATIENT)
Dept: GENERAL RADIOLOGY | Facility: CLINIC | Age: 34
End: 2019-02-27
Attending: FAMILY MEDICINE
Payer: COMMERCIAL

## 2019-02-27 ENCOUNTER — OFFICE VISIT (OUTPATIENT)
Dept: ORTHOPEDICS | Facility: CLINIC | Age: 34
End: 2019-02-27
Payer: COMMERCIAL

## 2019-02-27 VITALS
WEIGHT: 127 LBS | DIASTOLIC BLOOD PRESSURE: 72 MMHG | BODY MASS INDEX: 21.68 KG/M2 | SYSTOLIC BLOOD PRESSURE: 105 MMHG | HEIGHT: 64 IN

## 2019-02-27 DIAGNOSIS — M22.2X2 PATELLOFEMORAL PAIN SYNDROME OF BOTH KNEES: Primary | ICD-10-CM

## 2019-02-27 DIAGNOSIS — M25.532 BILATERAL WRIST PAIN: ICD-10-CM

## 2019-02-27 DIAGNOSIS — M25.561 CHRONIC PAIN OF BOTH KNEES: ICD-10-CM

## 2019-02-27 DIAGNOSIS — M25.531 BILATERAL WRIST PAIN: ICD-10-CM

## 2019-02-27 DIAGNOSIS — G89.29 CHRONIC PAIN OF BOTH KNEES: ICD-10-CM

## 2019-02-27 DIAGNOSIS — R29.898 WEAKNESS OF BOTH HIPS: ICD-10-CM

## 2019-02-27 DIAGNOSIS — M22.2X1 PATELLOFEMORAL PAIN SYNDROME OF BOTH KNEES: Primary | ICD-10-CM

## 2019-02-27 DIAGNOSIS — M67.432 GANGLION CYST OF WRIST, LEFT: ICD-10-CM

## 2019-02-27 DIAGNOSIS — M25.562 CHRONIC PAIN OF BOTH KNEES: ICD-10-CM

## 2019-02-27 PROCEDURE — 99204 OFFICE O/P NEW MOD 45 MIN: CPT | Performed by: FAMILY MEDICINE

## 2019-02-27 PROCEDURE — 73562 X-RAY EXAM OF KNEE 3: CPT | Mod: 59 | Performed by: FAMILY MEDICINE

## 2019-02-27 ASSESSMENT — MIFFLIN-ST. JEOR: SCORE: 1276.07

## 2019-02-27 ASSESSMENT — ENCOUNTER SYMPTOMS: EYE REDNESS: 0

## 2019-02-27 NOTE — LETTER
2/27/2019         RE: Aicha Armstrong  [address]  [place] [address]        Dear Colleague,    Thank you for referring your patient, Aicha Armstrong, to the  [facility]. Please see a copy of my visit note below.    [facility]   Clinic Visit s Feb 27, 2019    PCP: Chi Nicholson      Aicha is a 33 year old female who is seen as self referral for   Chief Complaint   Patient presents with     Left Knee - Pain     Right Knee - Pain     Left Wrist - Pain     Right Wrist - Pain       Injury: Reports insidious onset without acute precipitating event.     Location of Pain: bilateral knee anterior , nonradiating   Duration of Pain: 2 year(s)  Rating of Pain at worst: 8/10  Rating of Pain Currently: 4/10  Pain is better with: ibuprofen   Pain is worse with: running, prolonged sitting and stair climbing  Treatment so far consists of: ibuprofen  Associated symptoms: no distal numbness or tingling; denies swelling or warmth  Recent imaging completed: No recent imaging completed.  Prior History of related problems: family history of OA      The patient also has bilateral wrist pain. Today she has a cyst on the dorsal aspect of her left wrist. She has also noticed similar symptoms on her right wrist that come and go. She reports that the wrist pain interferes with ADLs. She is LHD. Denies numbness, tingling or other concerning symptoms.     Never red swollen or warm joints.     Social History: is employed as a/an       Past Medical History:   Diagnosis Date     Atypical squamous cells of undetermined significance (ASCUS) on Papanicolaou smear of cervix 12/2015    ASCUS cyto, repeat pap 1 yr       Patient Active Problem List    Diagnosis Date Noted     Routine history and physical examination of adult 02/25/2019     Priority: Medium     Atypical squamous cells of undetermined significance (ASCUS) on Papanicolaou smear of  cervix 12/01/2015     Priority: Medium     12/2015 ASCUS cyto, repeat pap 1 yr  12/15/16 Pap= Normal. Routine screening per guidelines       Migraine headache 04/25/2011     Priority: Medium     Insomnia 12/15/2010     Priority: Medium     ambien- felt groggy  Amitriptyline- did not like   Trazodone- no help     Melatonin does not help        CARDIOVASCULAR SCREENING; LDL GOAL LESS THAN 160 10/31/2010     Priority: Medium       Family History   Problem Relation Age of Onset     Arthritis Father         onset 23- 24 yrs     Diabetes Paternal Grandmother      Arthritis Paternal Grandmother      C.A.D. Maternal Grandmother      C.A.D. Maternal Grandfather      Prostate Cancer Maternal Grandfather      C.A.D. Paternal Grandfather      Arthritis Paternal Grandfather        Social History     Socioeconomic History     Marital status: Single     Spouse name: Not on file     Number of children: Not on file     Years of education: Not on file     Highest education level: Not on file   Occupational History     Occupation: college student at [employer]      Employer: [employer]   Social Needs     Financial resource strain: Not on file     Food insecurity:     Worry: Not on file     Inability: Not on file     Transportation needs:     Medical: Not on file     Non-medical: Not on file   Tobacco Use     Smoking status: Never Smoker     Smokeless tobacco: Never Used   Substance and Sexual Activity     Alcohol use: Yes     Alcohol/week: 0.0 oz     Comment: rarely       Past Surgical History:   Procedure Laterality Date     C APPENDECTOMY  1997    incidental with above     CHOLECYSTECTOMY, LAPOROSCOPIC  Feb 4 2010    Cholecystectomy, Laparoscopic     SURGICAL HISTORY OF -   1997    STOMACH SURGERY REMOVE HAIRBALL           Review of Systems   Constitutional: Negative for malaise/fatigue.   Eyes: Negative for redness.   Musculoskeletal: Positive for joint pain.   Skin: Negative for rash.   All other systems reviewed and are  "negative.        Physical Exam   Musculoskeletal:        Right knee: She exhibits no effusion.        Left knee: She exhibits no effusion.     /72   Ht 1.626 m (5' 4\")   Wt 57.6 kg (127 lb)   BMI 21.80 kg/m     Constitutional:well-developed, well-nourished, and in no distress.   Cardiovascular: Intact distal pulses.    Neurological: alert. Gait Normal:   Gait, station, stance, and balance appear normal for age  Skin: Skin is warm and dry.   Psychiatric: Mood and affect normal.   Respiratory: unlabored, speaks in full sentences  Lymph: no LAD, no lymphangitis            Right Knee Exam     Muscle Strength   The patient has normal right knee strength.    Tenderness   Right knee tenderness location: patella facets.    Range of Motion   Extension: normal   Flexion: normal     Tests   Nadine:  Medial - negative Lateral - negative  Varus: negative Valgus: negative  Lachman:  Anterior - negative      Drawer:  Anterior - negative    Posterior - negative  Patellar apprehension: negative    Other   Erythema: absent  Scars: absent  Sensation: normal  Pulse: present  Swelling: none  Effusion: no effusion present      Left Knee Exam     Muscle Strength   The patient has normal left knee strength.    Tenderness   Left knee tenderness location: patella facets.    Range of Motion   Extension: normal   Flexion: normal     Tests   Nadine:  Medial - negative Lateral - negative  Varus: negative Valgus: negative  Lachman:  Anterior - negative      Drawer:  Anterior - negative     Posterior - negative  Patellar apprehension: negative    Other   Erythema: absent  Scars: absent  Sensation: normal  Pulse: present  Swelling: none  Effusion: no effusion present      Right Hip Exam     Tenderness   The patient is experiencing no tenderness.     Muscle Strength   Abduction: 4/5   Adduction: 5/5   Flexion: 5/5       Left Hip Exam     Tenderness   The patient is experiencing no tenderness.     Muscle Strength   Abduction: 4/5 "   Adduction: 5/5   Flexion: 5/5       Right Hand Exam     Tenderness   The patient is experiencing no tenderness.     Range of Motion   Wrist   Extension: normal   Flexion: normal   Pronation: normal   Supination: normal     Muscle Strength   The patient has normal right wrist strength.    Other   Erythema: absent  Scars: absent  Sensation: normal  Pulse: present      Left Hand Exam     Tenderness   The patient is experiencing no tenderness.     Range of Motion   Wrist   Extension: normal   Flexion: normal   Pronation: normal   Supination: normal     Muscle Strength   The patient has normal left wrist strength.    Other   Erythema: absent  Scars: absent  Sensation: normal  Pulse: present             X-ray images Ordered and independently reviewed by me in the office today with the patient. X-ray shows:     Recent Results (from the past 744 hour(s))   XR Knee Bilateral 3 Views    Narrative    2/27/2019    No fracture, dislocation and or lesion. Normal alignment.  Joint space   maintained no significant arthritis. No appreciable soft tissue   abnormality       ASSESSMENT/PLAN    ICD-10-CM    1. Patellofemoral pain syndrome of both knees M22.2X1 [facility]    M22.2X2    2. Chronic pain of both knees M25.561 XR Knee Bilateral 3 Views    M25.562     G89.29    3. Weakness of both hips R29.898 [facility]   4. Ganglion cyst of wrist, left M67.432    5. Bilateral wrist pain M25.531     M25.532        Explained nature of syndrome as a patella maltracking issue, now seemingly more related to poor hip mechanics affecting the biomechanical function of the entire leg, leaving the patella to move in an inappropriate or dysfunctional manner in the patellar groove. Physical therapy aimed at restoring correct patella tracking by restoring overall normal leg function recommended.  This includes avoidance of quad exercises especially leg extensions and emphasis on hip abductor  and extensor strengthening. Noted mechanism for hip instability causing altered patellofemoral mechanics.  Recheck 4-6 weeks if not better.      Benign wrist exam except for LEFT dorsal ganglion cyst. discussed benign nature of wrist ganglion cysts.  If persistent or bothersome, multiple options exist including bracing, hand therapy, rest, aspiration and injection, surgery. Noted recurrence of cysts with aspiration and injection, estimated at 90% for dorsal side and 50% for ulnar side.      Advised on keeping neutral wrist position, avoiding deep wrist extension. Offered hand therapy; pt declined, may call if desired.       Again, thank you for allowing me to participate in the care of your patient.        Sincerely,        Daryl Delgadillo MD

## 2019-03-01 ENCOUNTER — THERAPY VISIT (OUTPATIENT)
Dept: PHYSICAL THERAPY | Facility: CLINIC | Age: 34
End: 2019-03-01
Payer: COMMERCIAL

## 2019-03-01 DIAGNOSIS — M99.05 SEGMENTAL AND SOMATIC DYSFUNCTION OF PELVIC REGION: ICD-10-CM

## 2019-03-01 DIAGNOSIS — M22.2X2 PATELLOFEMORAL PAIN SYNDROME OF BOTH KNEES: ICD-10-CM

## 2019-03-01 DIAGNOSIS — M62.81 MUSCLE WEAKNESS (GENERALIZED): ICD-10-CM

## 2019-03-01 DIAGNOSIS — M22.2X1 PATELLOFEMORAL PAIN SYNDROME OF BOTH KNEES: ICD-10-CM

## 2019-03-01 DIAGNOSIS — R29.898 WEAKNESS OF BOTH HIPS: Primary | ICD-10-CM

## 2019-03-01 PROCEDURE — 97110 THERAPEUTIC EXERCISES: CPT | Mod: GP | Performed by: PHYSICAL THERAPIST

## 2019-03-01 PROCEDURE — 97161 PT EVAL LOW COMPLEX 20 MIN: CPT | Mod: GP | Performed by: PHYSICAL THERAPIST

## 2019-03-01 ASSESSMENT — ACTIVITIES OF DAILY LIVING (ADL)
SIT WITH YOUR KNEE BENT: ACTIVITY IS NOT DIFFICULT
STANDING_FOR_15_MINUTES: NO DIFFICULTY AT ALL
LIMPING: THE SYMPTOM AFFECTS MY ACTIVITY MODERATELY
HOW_WOULD_YOU_RATE_THE_CURRENT_FUNCTION_OF_YOUR_KNEE_DURING_YOUR_USUAL_DAILY_ACTIVITIES_ON_A_SCALE_FROM_0_TO_100_WITH_100_BEING_YOUR_LEVEL_OF_KNEE_FUNCTION_PRIOR_TO_YOUR_INJURY_AND_0_BEING_THE_INABILITY_TO_PERFORM_ANY_OF_YOUR_USUAL_DAILY_ACTIVITIES?: 85
SITTING_FOR_15_MINUTES: NO DIFFICULTY AT ALL
GO UP STAIRS: ACTIVITY IS VERY DIFFICULT
PAIN: THE SYMPTOM AFFECTS MY ACTIVITY MODERATELY
HOS_ADL_HIGHEST_POTENTIAL_SCORE: 68
RISE FROM A CHAIR: ACTIVITY IS NOT DIFFICULT
RAW_SCORE: 40
SQUAT: ACTIVITY IS FAIRLY DIFFICULT
HEAVY_WORK: MODERATE DIFFICULTY
LIGHT_TO_MODERATE_WORK: NO DIFFICULTY AT ALL
PUTTING_ON_SOCKS_AND_SHOES: NO DIFFICULTY AT ALL
WALKING_15_MINUTES_OR_GREATER: SLIGHT DIFFICULTY
TWISTING/PIVOTING_ON_INVOLVED_LEG: SLIGHT DIFFICULTY
STEPPING_UP_AND_DOWN_CURBS: NO DIFFICULTY AT ALL
SWELLING: I DO NOT HAVE THE SYMPTOM
GOING_DOWN_1_FLIGHT_OF_STAIRS: MODERATE DIFFICULTY
GO DOWN STAIRS: ACTIVITY IS VERY DIFFICULT
KNEE_ACTIVITY_OF_DAILY_LIVING_SCORE: 57.14
STAND: ACTIVITY IS NOT DIFFICULT
ROLLING_OVER_IN_BED: NO DIFFICULTY AT ALL
WALKING_INITIALLY: NO DIFFICULTY AT ALL
AS_A_RESULT_OF_YOUR_KNEE_INJURY,_HOW_WOULD_YOU_RATE_YOUR_CURRENT_LEVEL_OF_DAILY_ACTIVITY?: NEARLY NORMAL
DEEP_SQUATTING: SLIGHT DIFFICULTY
WALKING_DOWN_STEEP_HILLS: MODERATE DIFFICULTY
HOS_ADL_SCORE(%): 79.41
KNEE_ACTIVITY_OF_DAILY_LIVING_SUM: 40
HOW_WOULD_YOU_RATE_YOUR_CURRENT_LEVEL_OF_FUNCTION_DURING_YOUR_USUAL_ACTIVITIES_OF_DAILY_LIVING_FROM_0_TO_100_WITH_100_BEING_YOUR_LEVEL_OF_FUNCTION_PRIOR_TO_YOUR_HIP_PROBLEM_AND_0_BEING_THE_INABILITY_TO_PERFORM_ANY_OF_YOUR_USUAL_DAILY_ACTIVITIES?: 85
WALKING_APPROXIMATELY_10_MINUTES: NO DIFFICULTY AT ALL
KNEEL ON THE FRONT OF YOUR KNEE: ACTIVITY IS VERY DIFFICULT
STIFFNESS: THE SYMPTOM AFFECTS MY ACTIVITY SLIGHTLY
HOS_ADL_COUNT: 17
HOW_WOULD_YOU_RATE_THE_OVERALL_FUNCTION_OF_YOUR_KNEE_DURING_YOUR_USUAL_DAILY_ACTIVITIES?: NEARLY NORMAL
GETTING_INTO_AND_OUT_OF_A_BATHTUB: NO DIFFICULTY AT ALL
GOING_UP_1_FLIGHT_OF_STAIRS: MODERATE DIFFICULTY
WALKING_UP_STEEP_HILLS: MODERATE DIFFICULTY
WALK: ACTIVITY IS MINIMALLY DIFFICULT
WEAKNESS: THE SYMPTOM AFFECTS MY ACTIVITY SLIGHTLY
RECREATIONAL_ACTIVITIES: SLIGHT DIFFICULTY
GETTING_INTO_AND_OUT_OF_AN_AVERAGE_CAR: NO DIFFICULTY AT ALL
GIVING WAY, BUCKLING OR SHIFTING OF KNEE: THE SYMPTOM AFFECTS MY ACTIVITY SEVERELY
HOS_ADL_ITEM_SCORE_TOTAL: 54

## 2019-03-01 NOTE — PROGRESS NOTES
[facility] Initial Evaluation  Subjective:Aicha Armstrong is a 33 year old female.    Patient s chief complaints: bilateral knee pain, lower sides of knee and sides of knee cap. Seeing chiropractor 1x/month for the past 3 years.  Condition occurred due to insidious onset since high school worsening in 20 years. .  Date of Onset: MD visit 2/27/19  Location of symptoms is bilateral knees and stiffness in back  .  Symptoms other than pain include: stiffness   Quality of pain is stiffness, aching, pain and frequency is intermittent.    Pain dependence on time of day is: no.   Pain rating is: 2/10.    Symptoms are exacerbated by: stairs down worse than up, biking, squatting,.    Symptoms are relieved by:  rest.    Progression of symptoms is that symptoms are:  Worsening over time.  Imaging/Special tests include: x-rays   Previous treatments include: chiropractic.   Patient reports that general health is: good.   Pertinent medical history includes:  Migraines/headaches, back pain.    Medical allergies includes: none.   Surgical history includes: gallbladder, appendix, deviated septum.  Current medications include: sleep, birth control  Current occupation is:   Work status is: full time  Primary job tasks include: computer work, prolonged sitting,   Barriers include: none  Red flags include: none    Patient's expectations for therapy include: Be able to squat, bike and move without knee pain    HPI                    Objective:  System         Lumbar/SI Evaluation  ROM:    AROM Lumbar:   Flexion:          Fingertip to knee  Ext:                    Minimal movement   Side Bend:        Left:  Fingertip to lower thigh    Right:  Fingertip to lower thigh  Rotation:           Left:     Right:   Side Glide:        Left:     Right:       AROM Thoracic:  Flex:              Ext:                 Rotation:        Left:  10 degrees    Right:  10 degrees   Arom wnl thoracic (lumbar): Patient  presents with significant thoracic flexion, with extremely tight lats bilaterally.     Lumbar Myotomes:  normal              Cord Signs:  normal    Lumbar Dermtomes:  normal                          SI joint/Sacrum:            Sacral conclusion left:  Posterior inominate, locked and sacral torsion                                         Knee Evaluation:  ROM:  Strength wnl knee: Strength bilateral LE grossly 4 to 4+/5 except glut med 3/5.  AROM      Extension:  Left: 0    Right:  0  Flexion: Left: 135    Right: 135    Pain: Slight pain over superior patella on the left      Ligament Testing:  Normal                Special Tests: Special test for knee: Slight lateral tracking of left patella.  Left knee positive for the following special tests:  Patellar Compression  Left knee negative for the following special tests:  Plica; Meninscal and Wendy's    Right knee negative for the following special tests:  Plica; Meniscal; Patellar Compression and Wendy's  Palpation:  Palpation of knee: Tight lateral hip rotators bilaterally. Tight ITB bilatetrally left greater than right.      Edema:  Normal      Functional Testing:  : Noted with squatting, pelvis shifts to left, and lumbar spine over flexes.                       General Evaluation:                        Posture:    Standing:   Rounded shoulders, forward head and thoracic kyphosis increased  Sitting:  Rounded shoulders, forward head, thoracic kyphosis increased and lordosis decreased                                           ROS    Assessment/Plan:    Patient is a 33 year old female with both sides knee complaints.    Patient has the following significant findings with corresponding treatment plan.                Diagnosis 1:  Bilateral patellofemoral dysfunction  Pain -  hot/cold therapy, manual therapy, self management, education and home program  Decreased joint mobility - manual therapy, therapeutic exercise, therapeutic activity and home program  Impaired posture  - neuro re-education, therapeutic activities and home program  Diagnosis 2:  Segmental and somatic pelvic dysfunction   Decreased joint mobility - manual therapy, therapeutic exercise, therapeutic activity and home program  Decreased strength - therapeutic exercise, therapeutic activities and home program  Impaired balance - neuro re-education, therapeutic activities and home program  Impaired posture - neuro re-education, therapeutic activities and home program  Diagnosis 3:  Impaired posture  Decreased ROM/flexibility - manual therapy, therapeutic exercise, therapeutic activity and home program  Impaired posture - neuro re-education, therapeutic activities and home program     Therapy Evaluation Codes:   1) History comprised of:   Personal factors that impact the plan of care:      Past/current experiences and Time since onset of symptoms.    Comorbidity factors that impact the plan of care are:      Migraines/headaches.     Medications impacting care: Sleep.  2) Examination of Body Systems comprised of:   Body structures and functions that impact the plan of care:      Knee, Pelvis, Sacral illiac joint and Thoracic Spine.   Activity limitations that impact the plan of care are:      Bending, Jumping, Lifting, Running, Sports, Squatting/kneeling, Stairs, Standing and Walking.  3) Clinical presentation characteristics are:   Stable/Uncomplicated.  4) Decision-Making    Low complexity using standardized patient assessment instrument and/or measureable assessment of functional outcome.  Cumulative Therapy Evaluation is: Low complexity.    Previous and current functional limitations:  (See Goal Flow Sheet for this information)    Short term and Long term goals: (See Goal Flow Sheet for this information)     Communication ability:  Patient appears to be able to clearly communicate and understand verbal and written communication and follow directions correctly.  Treatment Explanation - The following has been discussed  with the patient:   RX ordered/plan of care  Anticipated outcomes  Possible risks and side effects  This patient would benefit from PT intervention to resume normal activities.   Rehab potential is good.    Frequency:  1 X week, once daily  Duration:  for 10 weeks  Discharge Plan:  Achieve all LTG.  Independent in home treatment program.  Reach maximal therapeutic benefit.    Please refer to the daily flowsheet for treatment today, total treatment time and time spent performing 1:1 timed codes.

## 2019-03-06 PROBLEM — M62.81 MUSCLE WEAKNESS (GENERALIZED): Status: ACTIVE | Noted: 2019-03-06

## 2019-03-06 PROBLEM — M99.05 SEGMENTAL AND SOMATIC DYSFUNCTION OF PELVIC REGION: Status: ACTIVE | Noted: 2019-03-06

## 2019-03-07 ENCOUNTER — THERAPY VISIT (OUTPATIENT)
Dept: PHYSICAL THERAPY | Facility: CLINIC | Age: 34
End: 2019-03-07
Payer: COMMERCIAL

## 2019-03-07 DIAGNOSIS — M22.2X2 PATELLOFEMORAL PAIN SYNDROME OF BOTH KNEES: ICD-10-CM

## 2019-03-07 DIAGNOSIS — M22.2X1 PATELLOFEMORAL PAIN SYNDROME OF BOTH KNEES: ICD-10-CM

## 2019-03-07 DIAGNOSIS — M99.05 SEGMENTAL AND SOMATIC DYSFUNCTION OF PELVIC REGION: ICD-10-CM

## 2019-03-07 DIAGNOSIS — M62.81 MUSCLE WEAKNESS (GENERALIZED): ICD-10-CM

## 2019-03-07 PROCEDURE — 97110 THERAPEUTIC EXERCISES: CPT | Mod: GP | Performed by: PHYSICAL THERAPIST

## 2019-03-07 PROCEDURE — 97140 MANUAL THERAPY 1/> REGIONS: CPT | Mod: GP | Performed by: PHYSICAL THERAPIST

## 2019-03-14 ENCOUNTER — THERAPY VISIT (OUTPATIENT)
Dept: PHYSICAL THERAPY | Facility: CLINIC | Age: 34
End: 2019-03-14
Payer: COMMERCIAL

## 2019-03-14 DIAGNOSIS — M22.2X1 PATELLOFEMORAL PAIN SYNDROME OF BOTH KNEES: ICD-10-CM

## 2019-03-14 DIAGNOSIS — M99.05 SEGMENTAL AND SOMATIC DYSFUNCTION OF PELVIC REGION: ICD-10-CM

## 2019-03-14 DIAGNOSIS — M22.2X2 PATELLOFEMORAL PAIN SYNDROME OF BOTH KNEES: ICD-10-CM

## 2019-03-14 DIAGNOSIS — M62.81 MUSCLE WEAKNESS (GENERALIZED): ICD-10-CM

## 2019-03-14 PROCEDURE — 97140 MANUAL THERAPY 1/> REGIONS: CPT | Mod: GP | Performed by: PHYSICAL THERAPIST

## 2019-03-14 PROCEDURE — 97110 THERAPEUTIC EXERCISES: CPT | Mod: GP | Performed by: PHYSICAL THERAPIST

## 2019-03-21 ENCOUNTER — THERAPY VISIT (OUTPATIENT)
Dept: PHYSICAL THERAPY | Facility: CLINIC | Age: 34
End: 2019-03-21
Payer: COMMERCIAL

## 2019-03-21 DIAGNOSIS — M22.2X1 PATELLOFEMORAL PAIN SYNDROME OF BOTH KNEES: ICD-10-CM

## 2019-03-21 DIAGNOSIS — M62.81 MUSCLE WEAKNESS (GENERALIZED): ICD-10-CM

## 2019-03-21 DIAGNOSIS — M99.05 SEGMENTAL AND SOMATIC DYSFUNCTION OF PELVIC REGION: ICD-10-CM

## 2019-03-21 DIAGNOSIS — M22.2X2 PATELLOFEMORAL PAIN SYNDROME OF BOTH KNEES: ICD-10-CM

## 2019-03-21 PROCEDURE — 97140 MANUAL THERAPY 1/> REGIONS: CPT | Mod: GP | Performed by: PHYSICAL THERAPIST

## 2019-03-21 PROCEDURE — 97110 THERAPEUTIC EXERCISES: CPT | Mod: GP | Performed by: PHYSICAL THERAPIST

## 2019-04-04 ENCOUNTER — THERAPY VISIT (OUTPATIENT)
Dept: PHYSICAL THERAPY | Facility: CLINIC | Age: 34
End: 2019-04-04
Payer: COMMERCIAL

## 2019-04-04 DIAGNOSIS — M99.05 SEGMENTAL AND SOMATIC DYSFUNCTION OF PELVIC REGION: ICD-10-CM

## 2019-04-04 DIAGNOSIS — M22.2X2 PATELLOFEMORAL PAIN SYNDROME OF BOTH KNEES: ICD-10-CM

## 2019-04-04 DIAGNOSIS — M22.2X1 PATELLOFEMORAL PAIN SYNDROME OF BOTH KNEES: ICD-10-CM

## 2019-04-04 DIAGNOSIS — M62.81 MUSCLE WEAKNESS (GENERALIZED): ICD-10-CM

## 2019-04-04 PROCEDURE — 97140 MANUAL THERAPY 1/> REGIONS: CPT | Mod: GP

## 2019-04-04 PROCEDURE — 97110 THERAPEUTIC EXERCISES: CPT | Mod: GP

## 2019-04-18 ENCOUNTER — THERAPY VISIT (OUTPATIENT)
Dept: PHYSICAL THERAPY | Facility: CLINIC | Age: 34
End: 2019-04-18
Payer: COMMERCIAL

## 2019-04-18 DIAGNOSIS — M99.05 SEGMENTAL AND SOMATIC DYSFUNCTION OF PELVIC REGION: ICD-10-CM

## 2019-04-18 DIAGNOSIS — M22.2X1 PATELLOFEMORAL PAIN SYNDROME OF BOTH KNEES: ICD-10-CM

## 2019-04-18 DIAGNOSIS — M62.81 MUSCLE WEAKNESS (GENERALIZED): ICD-10-CM

## 2019-04-18 DIAGNOSIS — M22.2X2 PATELLOFEMORAL PAIN SYNDROME OF BOTH KNEES: ICD-10-CM

## 2019-04-18 PROCEDURE — 97530 THERAPEUTIC ACTIVITIES: CPT | Mod: GP

## 2019-04-18 PROCEDURE — 97140 MANUAL THERAPY 1/> REGIONS: CPT | Mod: GP

## 2019-04-18 PROCEDURE — 97110 THERAPEUTIC EXERCISES: CPT | Mod: GP

## 2019-06-03 ENCOUNTER — THERAPY VISIT (OUTPATIENT)
Dept: PHYSICAL THERAPY | Facility: CLINIC | Age: 34
End: 2019-06-03
Payer: COMMERCIAL

## 2019-06-03 DIAGNOSIS — M22.2X2 PATELLOFEMORAL PAIN SYNDROME OF BOTH KNEES: ICD-10-CM

## 2019-06-03 DIAGNOSIS — M99.05 SEGMENTAL AND SOMATIC DYSFUNCTION OF PELVIC REGION: ICD-10-CM

## 2019-06-03 DIAGNOSIS — M62.81 MUSCLE WEAKNESS (GENERALIZED): ICD-10-CM

## 2019-06-03 DIAGNOSIS — M22.2X1 PATELLOFEMORAL PAIN SYNDROME OF BOTH KNEES: ICD-10-CM

## 2019-06-03 PROCEDURE — 97530 THERAPEUTIC ACTIVITIES: CPT | Mod: GP | Performed by: PHYSICAL THERAPIST

## 2019-06-03 PROCEDURE — 97112 NEUROMUSCULAR REEDUCATION: CPT | Mod: GP | Performed by: PHYSICAL THERAPIST

## 2019-06-03 PROCEDURE — 97110 THERAPEUTIC EXERCISES: CPT | Mod: GP | Performed by: PHYSICAL THERAPIST

## 2019-06-26 ENCOUNTER — THERAPY VISIT (OUTPATIENT)
Dept: PHYSICAL THERAPY | Facility: CLINIC | Age: 34
End: 2019-06-26
Payer: COMMERCIAL

## 2019-06-26 DIAGNOSIS — M22.2X1 PATELLOFEMORAL PAIN SYNDROME OF BOTH KNEES: ICD-10-CM

## 2019-06-26 DIAGNOSIS — M22.2X2 PATELLOFEMORAL PAIN SYNDROME OF BOTH KNEES: ICD-10-CM

## 2019-06-26 DIAGNOSIS — M62.81 MUSCLE WEAKNESS (GENERALIZED): ICD-10-CM

## 2019-06-26 DIAGNOSIS — M99.05 SEGMENTAL AND SOMATIC DYSFUNCTION OF PELVIC REGION: ICD-10-CM

## 2019-06-26 PROCEDURE — 97140 MANUAL THERAPY 1/> REGIONS: CPT | Mod: GP | Performed by: PHYSICAL THERAPIST

## 2019-06-26 ASSESSMENT — ACTIVITIES OF DAILY LIVING (ADL)
HOW_WOULD_YOU_RATE_THE_CURRENT_FUNCTION_OF_YOUR_KNEE_DURING_YOUR_USUAL_DAILY_ACTIVITIES_ON_A_SCALE_FROM_0_TO_100_WITH_100_BEING_YOUR_LEVEL_OF_KNEE_FUNCTION_PRIOR_TO_YOUR_INJURY_AND_0_BEING_THE_INABILITY_TO_PERFORM_ANY_OF_YOUR_USUAL_DAILY_ACTIVITIES?: 100
RECREATIONAL_ACTIVITIES: NO DIFFICULTY AT ALL
HOS_ADL_SCORE(%): 100
LIGHT_TO_MODERATE_WORK: NO DIFFICULTY AT ALL
WALK: ACTIVITY IS NOT DIFFICULT
STIFFNESS: I DO NOT HAVE THE SYMPTOM
KNEE_ACTIVITY_OF_DAILY_LIVING_SUM: 70
WALKING_APPROXIMATELY_10_MINUTES: NO DIFFICULTY AT ALL
GO DOWN STAIRS: ACTIVITY IS NOT DIFFICULT
HOS_ADL_COUNT: 17
SITTING_FOR_15_MINUTES: NO DIFFICULTY AT ALL
TWISTING/PIVOTING_ON_INVOLVED_LEG: NO DIFFICULTY AT ALL
STEPPING_UP_AND_DOWN_CURBS: NO DIFFICULTY AT ALL
GOING_DOWN_1_FLIGHT_OF_STAIRS: NO DIFFICULTY AT ALL
DEEP_SQUATTING: NO DIFFICULTY AT ALL
WEAKNESS: I DO NOT HAVE THE SYMPTOM
WALKING_INITIALLY: NO DIFFICULTY AT ALL
GETTING_INTO_AND_OUT_OF_AN_AVERAGE_CAR: NO DIFFICULTY AT ALL
RISE FROM A CHAIR: ACTIVITY IS NOT DIFFICULT
SIT WITH YOUR KNEE BENT: ACTIVITY IS NOT DIFFICULT
PAIN: I DO NOT HAVE THE SYMPTOM
HOS_ADL_HIGHEST_POTENTIAL_SCORE: 68
WALKING_DOWN_STEEP_HILLS: NO DIFFICULTY AT ALL
STAND: ACTIVITY IS NOT DIFFICULT
HOW_WOULD_YOU_RATE_YOUR_CURRENT_LEVEL_OF_FUNCTION_DURING_YOUR_USUAL_ACTIVITIES_OF_DAILY_LIVING_FROM_0_TO_100_WITH_100_BEING_YOUR_LEVEL_OF_FUNCTION_PRIOR_TO_YOUR_HIP_PROBLEM_AND_0_BEING_THE_INABILITY_TO_PERFORM_ANY_OF_YOUR_USUAL_DAILY_ACTIVITIES?: 100
ROLLING_OVER_IN_BED: NO DIFFICULTY AT ALL
HOS_ADL_ITEM_SCORE_TOTAL: 68
PUTTING_ON_SOCKS_AND_SHOES: NO DIFFICULTY AT ALL
KNEEL ON THE FRONT OF YOUR KNEE: ACTIVITY IS NOT DIFFICULT
STANDING_FOR_15_MINUTES: NO DIFFICULTY AT ALL
SWELLING: I DO NOT HAVE THE SYMPTOM
GO UP STAIRS: ACTIVITY IS NOT DIFFICULT
GOING_UP_1_FLIGHT_OF_STAIRS: NO DIFFICULTY AT ALL
HEAVY_WORK: NO DIFFICULTY AT ALL
KNEE_ACTIVITY_OF_DAILY_LIVING_SCORE: 100
GIVING WAY, BUCKLING OR SHIFTING OF KNEE: I DO NOT HAVE THE SYMPTOM
RAW_SCORE: 70
WALKING_UP_STEEP_HILLS: NO DIFFICULTY AT ALL
AS_A_RESULT_OF_YOUR_KNEE_INJURY,_HOW_WOULD_YOU_RATE_YOUR_CURRENT_LEVEL_OF_DAILY_ACTIVITY?: NORMAL
LIMPING: I DO NOT HAVE THE SYMPTOM
SQUAT: ACTIVITY IS NOT DIFFICULT
GETTING_INTO_AND_OUT_OF_A_BATHTUB: NO DIFFICULTY AT ALL
HOW_WOULD_YOU_RATE_THE_OVERALL_FUNCTION_OF_YOUR_KNEE_DURING_YOUR_USUAL_DAILY_ACTIVITIES?: NORMAL
WALKING_15_MINUTES_OR_GREATER: NO DIFFICULTY AT ALL

## 2019-06-28 PROBLEM — M22.2X1 PATELLOFEMORAL PAIN SYNDROME OF BOTH KNEES: Status: RESOLVED | Noted: 2019-02-27 | Resolved: 2019-06-26

## 2019-06-28 PROBLEM — M99.05 SEGMENTAL AND SOMATIC DYSFUNCTION OF PELVIC REGION: Status: RESOLVED | Noted: 2019-03-06 | Resolved: 2019-06-26

## 2019-06-28 PROBLEM — M22.2X2 PATELLOFEMORAL PAIN SYNDROME OF BOTH KNEES: Status: RESOLVED | Noted: 2019-02-27 | Resolved: 2019-06-26

## 2019-06-28 PROBLEM — M62.81 MUSCLE WEAKNESS (GENERALIZED): Status: RESOLVED | Noted: 2019-03-06 | Resolved: 2019-06-26

## 2019-06-28 NOTE — PROGRESS NOTES
DISCHARGE REPORT    Progress reporting period is from 3/1/19 to 6/26/19.       SUBJECTIVE   Subjective: Pt reports she returned to full workout at gym with no problems. Able to climb 4 flights of stairs up and down with no knee pain. Is independent with her HEP and has returned to all functional activities without pain     Current Pain level: 0/10.      Initial Pain level: 2/10.   Changes in function:  Pt is able to ascend and descend stairs with no knee pain. Has returned to lifting weights, squatting and elliptical with no c/o knee pain.   Adverse reaction to treatment or activity: None    OBJECTIVE  Changes noted in objective findings:  The objective findings below are from DOS 6/26/19.  Objective: Full knee AROM without pain. Full squat to floor without knee pain or translation of knee over toes. Great alignment of pelvis and great spine, pelvis and LE movement with squats and exercises.  No patellofemoral crepitus or pain with patellar compression      ASSESSMENT/PLAN  Updated problem list and treatment plan: Diagnosis 1:  Knee pain  - patient met all goals of treatment. No problem list.   STG/LTGs have been met or progress has been made towards goals:  Yes (See Goal flow sheet completed today.)  Assessment of Progress: The patient has met all of their long term goals.  Self Management Plans:  Patient has been instructed in a home treatment program.  Patient is independent in a home treatment program.  Patient  has been instructed in self management of symptoms.  Patient is independent in self management of symptoms.  Patient has been instructed to contact this clinic or her MD if further problems or questions arise.   Aicha continues to require the following intervention to meet STG and LTG's:  PT intervention is no longer required to meet STG/LTG.    Recommendations:  This patient is ready to be discharged from therapy and continue their home treatment program.    Please refer to the daily flowsheet for  treatment today, total treatment time and time spent performing 1:1 timed codes.

## 2019-10-03 ENCOUNTER — HEALTH MAINTENANCE LETTER (OUTPATIENT)
Age: 34
End: 2019-10-03

## 2019-11-12 DIAGNOSIS — Z30.41 ORAL CONTRACEPTIVE USE: ICD-10-CM

## 2019-11-12 NOTE — TELEPHONE ENCOUNTER
"Last Written Prescription Date:  2/25/19  Last Fill Quantity: 84 tablet,  # refills: 3   Last office visit: 2/25/2019 with prescribing provider:  Bharat   Future Office Visit:      Requested Prescriptions   Pending Prescriptions Disp Refills     levonorgestrel-ethinyl estradiol (AVIANE/ALESSE/LESSINA) 0.1-20 MG-MCG tablet 84 tablet 3     Sig: Take 1 tablet by mouth daily Continuously       Contraceptives Protocol Passed - 11/12/2019  2:43 PM        Passed - Patient is not a current smoker if age is 35 or older        Passed - Recent (12 mo) or future (30 days) visit within the authorizing provider's specialty     Patient has had an office visit with the authorizing provider or a provider within the authorizing providers department within the previous 12 mos or has a future within next 30 days. See \"Patient Info\" tab in inbasket, or \"Choose Columns\" in Meds & Orders section of the refill encounter.              Passed - Medication is active on med list        Passed - No active pregnancy on record        Passed - No positive pregnancy test in past 12 months          "

## 2019-11-13 RX ORDER — LEVONORGESTREL/ETHIN.ESTRADIOL 0.1-0.02MG
1 TABLET ORAL DAILY
Qty: 84 TABLET | Refills: 0 | Status: SHIPPED | OUTPATIENT
Start: 2019-11-13 | End: 2020-02-26

## 2020-02-26 ENCOUNTER — OFFICE VISIT (OUTPATIENT)
Dept: FAMILY MEDICINE | Facility: CLINIC | Age: 35
End: 2020-02-26
Payer: COMMERCIAL

## 2020-02-26 VITALS
TEMPERATURE: 97.9 F | HEIGHT: 64 IN | SYSTOLIC BLOOD PRESSURE: 88 MMHG | DIASTOLIC BLOOD PRESSURE: 59 MMHG | WEIGHT: 128 LBS | HEART RATE: 76 BPM | OXYGEN SATURATION: 100 % | BODY MASS INDEX: 21.85 KG/M2

## 2020-02-26 DIAGNOSIS — Z23 NEED FOR VACCINATION: ICD-10-CM

## 2020-02-26 DIAGNOSIS — Z30.41 ORAL CONTRACEPTIVE USE: ICD-10-CM

## 2020-02-26 DIAGNOSIS — Z12.4 CERVICAL CANCER SCREENING: ICD-10-CM

## 2020-02-26 DIAGNOSIS — Z13.220 LIPID SCREENING: ICD-10-CM

## 2020-02-26 DIAGNOSIS — Z00.00 ROUTINE HISTORY AND PHYSICAL EXAMINATION OF ADULT: Primary | ICD-10-CM

## 2020-02-26 LAB
ALBUMIN SERPL-MCNC: 3.7 G/DL (ref 3.4–5)
ALP SERPL-CCNC: 44 U/L (ref 40–150)
ALT SERPL W P-5'-P-CCNC: 15 U/L (ref 0–50)
ANION GAP SERPL CALCULATED.3IONS-SCNC: 7 MMOL/L (ref 3–14)
AST SERPL W P-5'-P-CCNC: 9 U/L (ref 0–45)
BASOPHILS # BLD AUTO: 0 10E9/L (ref 0–0.2)
BASOPHILS NFR BLD AUTO: 0.3 %
BILIRUB SERPL-MCNC: 0.4 MG/DL (ref 0.2–1.3)
BUN SERPL-MCNC: 13 MG/DL (ref 7–30)
CALCIUM SERPL-MCNC: 9.1 MG/DL (ref 8.5–10.1)
CHLORIDE SERPL-SCNC: 108 MMOL/L (ref 94–109)
CHOLEST SERPL-MCNC: 170 MG/DL
CO2 SERPL-SCNC: 23 MMOL/L (ref 20–32)
CREAT SERPL-MCNC: 0.79 MG/DL (ref 0.52–1.04)
DIFFERENTIAL METHOD BLD: NORMAL
EOSINOPHIL # BLD AUTO: 0.3 10E9/L (ref 0–0.7)
EOSINOPHIL NFR BLD AUTO: 3.4 %
ERYTHROCYTE [DISTWIDTH] IN BLOOD BY AUTOMATED COUNT: 12.2 % (ref 10–15)
GFR SERPL CREATININE-BSD FRML MDRD: >90 ML/MIN/{1.73_M2}
GLUCOSE SERPL-MCNC: 83 MG/DL (ref 70–99)
HCT VFR BLD AUTO: 43.6 % (ref 35–47)
HDLC SERPL-MCNC: 64 MG/DL
HGB BLD-MCNC: 14.8 G/DL (ref 11.7–15.7)
LDLC SERPL CALC-MCNC: 96 MG/DL
LYMPHOCYTES # BLD AUTO: 1.9 10E9/L (ref 0.8–5.3)
LYMPHOCYTES NFR BLD AUTO: 20.1 %
MCH RBC QN AUTO: 31.2 PG (ref 26.5–33)
MCHC RBC AUTO-ENTMCNC: 33.9 G/DL (ref 31.5–36.5)
MCV RBC AUTO: 92 FL (ref 78–100)
MONOCYTES # BLD AUTO: 0.7 10E9/L (ref 0–1.3)
MONOCYTES NFR BLD AUTO: 7.2 %
NEUTROPHILS # BLD AUTO: 6.6 10E9/L (ref 1.6–8.3)
NEUTROPHILS NFR BLD AUTO: 69 %
NONHDLC SERPL-MCNC: 106 MG/DL
PLATELET # BLD AUTO: 248 10E9/L (ref 150–450)
POTASSIUM SERPL-SCNC: 4.3 MMOL/L (ref 3.4–5.3)
PROT SERPL-MCNC: 7.5 G/DL (ref 6.8–8.8)
RBC # BLD AUTO: 4.74 10E12/L (ref 3.8–5.2)
SODIUM SERPL-SCNC: 138 MMOL/L (ref 133–144)
TRIGL SERPL-MCNC: 48 MG/DL
WBC # BLD AUTO: 9.6 10E9/L (ref 4–11)

## 2020-02-26 PROCEDURE — 85025 COMPLETE CBC W/AUTO DIFF WBC: CPT | Performed by: INTERNAL MEDICINE

## 2020-02-26 PROCEDURE — 36415 COLL VENOUS BLD VENIPUNCTURE: CPT | Performed by: INTERNAL MEDICINE

## 2020-02-26 PROCEDURE — 90471 IMMUNIZATION ADMIN: CPT | Performed by: INTERNAL MEDICINE

## 2020-02-26 PROCEDURE — 90714 TD VACC NO PRESV 7 YRS+ IM: CPT | Performed by: INTERNAL MEDICINE

## 2020-02-26 PROCEDURE — 99395 PREV VISIT EST AGE 18-39: CPT | Mod: 25 | Performed by: INTERNAL MEDICINE

## 2020-02-26 PROCEDURE — 80061 LIPID PANEL: CPT | Performed by: INTERNAL MEDICINE

## 2020-02-26 PROCEDURE — 80053 COMPREHEN METABOLIC PANEL: CPT | Performed by: INTERNAL MEDICINE

## 2020-02-26 RX ORDER — LEVONORGESTREL/ETHIN.ESTRADIOL 0.1-0.02MG
1 TABLET ORAL DAILY
Qty: 84 TABLET | Refills: 3 | Status: ON HOLD | OUTPATIENT
Start: 2020-02-26 | End: 2023-02-21

## 2020-02-26 ASSESSMENT — ENCOUNTER SYMPTOMS
FATIGUE: 0
ABDOMINAL PAIN: 0
CHILLS: 0
NERVOUS/ANXIOUS: 0
DYSPHORIC MOOD: 0
SLEEP DISTURBANCE: 0
FEVER: 0
LIGHT-HEADEDNESS: 0
UNEXPECTED WEIGHT CHANGE: 0
WEAKNESS: 0
BACK PAIN: 0
MYALGIAS: 0
HEADACHES: 0
DIARRHEA: 0
COUGH: 0
VOMITING: 0
ARTHRALGIAS: 0
DIFFICULTY URINATING: 0
NAUSEA: 0
NUMBNESS: 0
BLOOD IN STOOL: 0
NECK PAIN: 0
EYE PAIN: 0
PALPITATIONS: 0
CONSTIPATION: 0
DIZZINESS: 0
TROUBLE SWALLOWING: 0
SHORTNESS OF BREATH: 0
SORE THROAT: 0

## 2020-02-26 ASSESSMENT — MIFFLIN-ST. JEOR: SCORE: 1275.6

## 2020-02-26 NOTE — NURSING NOTE
Prior to immunization administration, verified patients identity using patient s name and date of birth. Please see Immunization Activity for additional information.     Screening Questionnaire for Adult Immunization    Are you sick today?   No   Do you have allergies to medications, food, a vaccine component or latex?   No   Have you ever had a serious reaction after receiving a vaccination?   No   Do you have a long-term health problem with heart, lung, kidney, or metabolic disease (e.g., diabetes), asthma, a blood disorder, no spleen, complement component deficiency, a cochlear implant, or a spinal fluid leak?  Are you on long-term aspirin therapy?   No   Do you have cancer, leukemia, HIV/AIDS, or any other immune system problem?   No   Do you have a parent, brother, or sister with an immune system problem?   No   In the past 3 months, have you taken medications that affect  your immune system, such as prednisone, other steroids, or anticancer drugs; drugs for the treatment of rheumatoid arthritis, Crohn s disease, or psoriasis; or have you had radiation treatments?   No   Have you had a seizure, or a brain or other nervous system problem?   No   During the past year, have you received a transfusion of blood or blood    products, or been given immune (gamma) globulin or antiviral drug?   No   For women: Are you pregnant or is there a chance you could become       pregnant during the next month?   No   Have you received any vaccinations in the past 4 weeks?   No     Immunization questionnaire answers were all negative.        Per orders of Dr. Nicholson, injection of Td given by Rachele Horta MA. Patient instructed to remain in clinic for 15 minutes afterwards, and to report any adverse reaction to me immediately.       Screening performed by Rachele Horta MA on 2/26/2020 at 9:43 AM.

## 2020-02-26 NOTE — PROGRESS NOTES
SUBJECTIVE:   CC: Aicha Armstrong is an 34 year old woman who presents for preventive health visit.       No subjective complaints presented.    Prior knee pain has resolved after PT.      Healthy Habits:     Getting at least 3 servings of Calcium per day:  Yes    Bi-annual eye exam:  Yes    Dental care twice a year:  Yes    Sleep apnea or symptoms of sleep apnea:  None    Diet:  Regular (no restrictions)    Frequency of exercise:  4-5 days/week    Duration of exercise:  15-30 minutes    Taking medications regularly:  Yes    Medication side effects:  None    PHQ-2 Total Score: 0    Additional concerns today:  No        Today's PHQ-2 Score:   PHQ-2 ( 1999 [employer]) 2/24/2020   Q1: Little interest or pleasure in doing things 0   Q2: Feeling down, depressed or hopeless 0   PHQ-2 Score 0   Q1: Little interest or pleasure in doing things Not at all   Q2: Feeling down, depressed or hopeless Not at all   PHQ-2 Score 0       Abuse: Current or Past(Physical, Sexual or Emotional)- No  Do you feel safe in your environment? Yes      Social History     Tobacco Use     Smoking status: Never Smoker     Smokeless tobacco: Never Used   Substance Use Topics     Alcohol use: Yes     Alcohol/week: 0.0 standard drinks     Comment: rarely     If you drink alcohol do you typically have >3 drinks per day or >7 drinks per week? No    Alcohol Use 2/26/2020   Prescreen: >3 drinks/day or >7 drinks/week? -   Prescreen: >3 drinks/day or >7 drinks/week? No       Reviewed orders with patient.  Reviewed health maintenance and updated orders accordingly - Yes      PAP / HPV 12/15/2016 12/3/2015 11/21/2012   PAP NIL ASC-US(A) NIL     Reviewed and updated as needed this visit by clinical staff  Tobacco  Allergies  Meds  Problems  Med Hx  Surg Hx       Reviewed and updated as needed this visit by Provider  Allergies  Meds  Problems  Med Hx  Surg Hx        Past Medical History:   Diagnosis Date     Arthritis     Both of my parents have  arthritis     Atypical squamous cells of undetermined significance (ASCUS) on Papanicolaou smear of cervix 12/2015    ASCUS cyto, repeat pap 1 yr     Cancer (H)     My Great Grandma had breast cancer     Diabetes (H)     My Grandma & Aunt on Dad's side have type 2     Hypertension     Mom has high blood pressure       Past Surgical History:   Procedure Laterality Date     C APPENDECTOMY  1997    incidental with above     CHOLECYSTECTOMY, LAPOROSCOPIC  Feb 4 2010    Cholecystectomy, Laparoscopic     SURGICAL HISTORY OF -   1997    STOMACH SURGERY REMOVE HAIRBALL       Family History   Problem Relation Age of Onset     Arthritis Father         onset 23- 24 yrs     Hypertension Mother      Hyperlipidemia Mother      Diabetes Paternal Grandmother      Arthritis Paternal Grandmother      C.A.D. Maternal Grandmother      C.A.D. Maternal Grandfather      Prostate Cancer Maternal Grandfather      C.A.D. Paternal Grandfather      Arthritis Paternal Grandfather      Breast Cancer Other        Social History     Tobacco Use     Smoking status: Never Smoker     Smokeless tobacco: Never Used   Substance Use Topics     Alcohol use: Yes     Alcohol/week: 0.0 standard drinks     Comment: rarely       Review of Systems   Constitutional: Negative for chills, fatigue, fever and unexpected weight change.   HENT: Negative for congestion, ear pain, hearing loss, sore throat and trouble swallowing.    Eyes: Negative for pain and visual disturbance.   Respiratory: Negative for cough and shortness of breath.    Cardiovascular: Negative for chest pain, palpitations and leg swelling.   Gastrointestinal: Negative for abdominal pain, blood in stool, constipation, diarrhea, nausea and vomiting.   Genitourinary: Negative for difficulty urinating.   Musculoskeletal: Negative for arthralgias, back pain, myalgias and neck pain.   Skin: Negative for rash.   Neurological: Negative for dizziness, syncope, weakness, light-headedness, numbness and  "headaches.   Psychiatric/Behavioral: Negative for dysphoric mood and sleep disturbance. The patient is not nervous/anxious.         OBJECTIVE:   BP (!) 88/59 (BP Location: Left arm, Cuff Size: Adult Regular)   Pulse 76   Temp 97.9  F (36.6  C) (Tympanic)   Ht 1.626 m (5' 4\")   Wt 58.1 kg (128 lb)   SpO2 100%   BMI 21.97 kg/m    Physical Exam  Vitals signs and nursing note reviewed.   Constitutional:       General: She is not in acute distress.  HENT:      Right Ear: Tympanic membrane, ear canal and external ear normal.      Left Ear: Tympanic membrane, ear canal and external ear normal.      Nose: Nose normal.      Mouth/Throat:      Pharynx: Oropharynx is clear.   Eyes:      Conjunctiva/sclera: Conjunctivae normal.      Pupils: Pupils are equal, round, and reactive to light.   Neck:      Musculoskeletal: Normal range of motion and neck supple.      Thyroid: No thyromegaly.   Cardiovascular:      Rate and Rhythm: Normal rate and regular rhythm.      Heart sounds: Normal heart sounds.   Pulmonary:      Effort: Pulmonary effort is normal. No respiratory distress.      Breath sounds: Normal breath sounds.   Abdominal:      General: Bowel sounds are normal.      Palpations: Abdomen is soft.      Tenderness: There is no abdominal tenderness.   Musculoskeletal: Normal range of motion.         General: No tenderness.   Lymphadenopathy:      Cervical: No cervical adenopathy.   Skin:     Findings: No rash.   Neurological:      Mental Status: She is alert and oriented to person, place, and time.      Coordination: Coordination normal.      Deep Tendon Reflexes: Reflexes are normal and symmetric.   Psychiatric:         Mood and Affect: Mood normal.         Behavior: Behavior normal.       ASSESSMENT/PLAN:       ICD-10-CM    1. Routine history and physical examination of adult Z00.00 CBC with platelets differential     Comprehensive metabolic panel   2. Oral contraceptive use Z30.41 levonorgestrel-ethinyl estradiol " "(AVIANE) 0.1-20 MG-MCG tablet   3. Cervical cancer screening Z12.4 OB/GYN REFERRAL   4. Lipid screening Z13.220 Lipid panel reflex to direct LDL Fasting   5. Need for vaccination Z23 TD PRSERV FREE >=7 YRS ADS IM [63930]     1st  Administration  [01523]         Patient Instructions   Follow up with OB-GYN for your Cervical Cancer screening.    Maintain low fat/calorie diet and regular exercise.    Follow up yearly or as needed.  COUNSELING:  Reviewed preventive health counseling, as reflected in patient instructions    Estimated body mass index is 21.97 kg/m  as calculated from the following:    Height as of this encounter: 1.626 m (5' 4\").    Weight as of this encounter: 58.1 kg (128 lb).       reports that she has never smoked. She has never used smokeless tobacco.      Counseling Resources:  ATP IV Guidelines  Pooled Cohorts Equation Calculator  Breast Cancer Risk Calculator  FRAX Risk Assessment  ICSI Preventive Guidelines  Dietary Guidelines for Americans, 2010  USDA's MyPlate  ASA Prophylaxis  Lung CA Screening    Chi Nicholson MD  [facility]  "

## 2020-02-26 NOTE — PATIENT INSTRUCTIONS
Follow up with OB-GYN for your Cervical Cancer screening.    Maintain low fat/calorie diet and regular exercise.    Follow up yearly or as needed.

## 2020-04-23 ENCOUNTER — TELEPHONE (OUTPATIENT)
Dept: FAMILY MEDICINE | Facility: CLINIC | Age: 35
End: 2020-04-23

## 2020-04-23 NOTE — TELEPHONE ENCOUNTER
Panel Management Review      Patient has the following on her problem list: None      Composite cancer screening  Chart review shows that this patient is due/due soon for the following influenza  Summary:    Patient is due/failing the following:   Influenza vaccine     Action needed:   Declined on 2/26/20    Type of outreach:    declined at visit    Questions for provider review:    None                                                                                                                                    Rachele Horta MA on 4/23/2020 at 3:05 PM

## 2020-11-07 ENCOUNTER — HEALTH MAINTENANCE LETTER (OUTPATIENT)
Age: 35
End: 2020-11-07

## 2021-03-27 ENCOUNTER — HEALTH MAINTENANCE LETTER (OUTPATIENT)
Age: 36
End: 2021-03-27

## 2021-09-05 ENCOUNTER — HEALTH MAINTENANCE LETTER (OUTPATIENT)
Age: 36
End: 2021-09-05

## 2022-04-17 ENCOUNTER — HEALTH MAINTENANCE LETTER (OUTPATIENT)
Age: 37
End: 2022-04-17

## 2022-07-29 LAB
HEPATITIS B SURFACE ANTIGEN (EXTERNAL): NEGATIVE
HIV1+2 AB SERPL QL IA: NEGATIVE
VDRL (SYPHILIS) (EXTERNAL): NONREACTIVE

## 2022-08-29 ENCOUNTER — TRANSCRIBE ORDERS (OUTPATIENT)
Dept: MATERNAL FETAL MEDICINE | Facility: CLINIC | Age: 37
End: 2022-08-29

## 2022-08-29 DIAGNOSIS — O26.90 PREGNANCY RELATED CONDITION, ANTEPARTUM: Primary | ICD-10-CM

## 2022-09-22 ENCOUNTER — PRE VISIT (OUTPATIENT)
Dept: MATERNAL FETAL MEDICINE | Facility: CLINIC | Age: 37
End: 2022-09-22

## 2022-09-30 ENCOUNTER — HOSPITAL ENCOUNTER (OUTPATIENT)
Dept: ULTRASOUND IMAGING | Facility: CLINIC | Age: 37
Discharge: HOME | End: 2022-09-30
Attending: SPECIALIST
Payer: COMMERCIAL

## 2022-09-30 ENCOUNTER — OFFICE VISIT (OUTPATIENT)
Dept: MATERNAL FETAL MEDICINE | Facility: CLINIC | Age: 37
End: 2022-09-30
Attending: SPECIALIST
Payer: COMMERCIAL

## 2022-09-30 DIAGNOSIS — O09.812 PREGNANCY RESULTING FROM IN VITRO FERTILIZATION IN SECOND TRIMESTER: Primary | ICD-10-CM

## 2022-09-30 DIAGNOSIS — O26.90 PREGNANCY RELATED CONDITION, ANTEPARTUM: ICD-10-CM

## 2022-09-30 PROCEDURE — 76811 OB US DETAILED SNGL FETUS: CPT | Mod: 26 | Performed by: OBSTETRICS & GYNECOLOGY

## 2022-09-30 PROCEDURE — 76811 OB US DETAILED SNGL FETUS: CPT

## 2022-09-30 NOTE — PROGRESS NOTES
"Please see \"Imaging\" tab under \"Chart Review\" for details of today's US at the [facility].    Carlos Fournier MD  Maternal-Fetal Medicine    "

## 2022-10-23 ENCOUNTER — HEALTH MAINTENANCE LETTER (OUTPATIENT)
Age: 37
End: 2022-10-23

## 2022-10-28 ENCOUNTER — HOSPITAL ENCOUNTER (OUTPATIENT)
Dept: ULTRASOUND IMAGING | Facility: CLINIC | Age: 37
Discharge: HOME | End: 2022-10-28
Attending: SPECIALIST
Payer: COMMERCIAL

## 2022-10-28 ENCOUNTER — OFFICE VISIT (OUTPATIENT)
Dept: MATERNAL FETAL MEDICINE | Facility: CLINIC | Age: 37
End: 2022-10-28
Attending: SPECIALIST
Payer: COMMERCIAL

## 2022-10-28 DIAGNOSIS — O26.90 PREGNANCY RELATED CONDITION, ANTEPARTUM: ICD-10-CM

## 2022-10-28 DIAGNOSIS — O09.812 PREGNANCY RESULTING FROM IN VITRO FERTILIZATION IN SECOND TRIMESTER: Primary | ICD-10-CM

## 2022-10-28 PROCEDURE — 76827 ECHO EXAM OF FETAL HEART: CPT | Mod: 26 | Performed by: OBSTETRICS & GYNECOLOGY

## 2022-10-28 PROCEDURE — 93325 DOPPLER ECHO COLOR FLOW MAPG: CPT | Mod: 26 | Performed by: OBSTETRICS & GYNECOLOGY

## 2022-10-28 PROCEDURE — 76825 ECHO EXAM OF FETAL HEART: CPT | Mod: 26 | Performed by: OBSTETRICS & GYNECOLOGY

## 2022-10-28 PROCEDURE — 93325 DOPPLER ECHO COLOR FLOW MAPG: CPT

## 2022-10-28 NOTE — PROGRESS NOTES
Please see full imaging report from [employer] program under imaging tab.    Sylvester Lea MD  Maternal Fetal Medicine

## 2023-01-19 ENCOUNTER — HOSPITAL ENCOUNTER (OUTPATIENT)
Facility: CLINIC | Age: 38
End: 2023-01-19
Admitting: SPECIALIST
Payer: COMMERCIAL

## 2023-02-03 LAB — GROUP B STREPTOCOCCUS (EXTERNAL): NEGATIVE

## 2023-02-21 ENCOUNTER — HOSPITAL ENCOUNTER (OUTPATIENT)
Facility: CLINIC | Age: 38
Discharge: HOME | End: 2023-02-21
Attending: SPECIALIST | Admitting: SPECIALIST
Payer: COMMERCIAL

## 2023-02-21 VITALS — RESPIRATION RATE: 16 BRPM | DIASTOLIC BLOOD PRESSURE: 85 MMHG | SYSTOLIC BLOOD PRESSURE: 133 MMHG | TEMPERATURE: 98.2 F

## 2023-02-21 PROCEDURE — 59025 FETAL NON-STRESS TEST: CPT

## 2023-02-21 PROCEDURE — G0463 HOSPITAL OUTPT CLINIC VISIT: HCPCS | Mod: 25

## 2023-02-21 RX ORDER — ONDANSETRON 2 MG/ML
4 INJECTION INTRAMUSCULAR; INTRAVENOUS EVERY 6 HOURS PRN
Status: DISCONTINUED | OUTPATIENT
Start: 2023-02-21 | End: 2023-02-21 | Stop reason: HOSPADM

## 2023-02-21 RX ORDER — METOCLOPRAMIDE 10 MG/1
10 TABLET ORAL EVERY 6 HOURS PRN
Status: DISCONTINUED | OUTPATIENT
Start: 2023-02-21 | End: 2023-02-21 | Stop reason: HOSPADM

## 2023-02-21 RX ORDER — PROCHLORPERAZINE MALEATE 5 MG
10 TABLET ORAL EVERY 6 HOURS PRN
Status: DISCONTINUED | OUTPATIENT
Start: 2023-02-21 | End: 2023-02-21 | Stop reason: HOSPADM

## 2023-02-21 RX ORDER — METOCLOPRAMIDE HYDROCHLORIDE 5 MG/ML
10 INJECTION INTRAMUSCULAR; INTRAVENOUS EVERY 6 HOURS PRN
Status: DISCONTINUED | OUTPATIENT
Start: 2023-02-21 | End: 2023-02-21 | Stop reason: HOSPADM

## 2023-02-21 RX ORDER — PROCHLORPERAZINE 25 MG
25 SUPPOSITORY, RECTAL RECTAL EVERY 12 HOURS PRN
Status: DISCONTINUED | OUTPATIENT
Start: 2023-02-21 | End: 2023-02-21 | Stop reason: HOSPADM

## 2023-02-21 RX ORDER — ONDANSETRON 4 MG/1
4 TABLET, ORALLY DISINTEGRATING ORAL EVERY 6 HOURS PRN
Status: DISCONTINUED | OUTPATIENT
Start: 2023-02-21 | End: 2023-02-21 | Stop reason: HOSPADM

## 2023-02-21 RX ORDER — LEVOTHYROXINE SODIUM 50 UG/1
50 TABLET ORAL DAILY
COMMUNITY
End: 2023-03-29

## 2023-02-21 ASSESSMENT — ACTIVITIES OF DAILY LIVING (ADL): ADLS_ACUITY_SCORE: 31

## 2023-02-21 NOTE — DISCHARGE INSTRUCTIONS
Discharge Instruction for Undelivered Patients      You were seen for: Labor Assessment  We Consulted: Dr Hamm  You had (Test or Medicine):Non stress test and vaginal exam     Diet:   Drink 8 to 12 glasses of liquids (milk, juice, water) every day.  You may eat meals and snacks.     Activity:  Count fetal kicks everyday (see handout)  Call your doctor or nurse midwife if your baby is moving less than usual.     Call your provider if you notice:  Swelling in your face or increased swelling in your hands or legs.  Headaches that are not relieved by Tylenol (acetaminophen).  Changes in your vision (blurring: seeing spots or stars.)  Nausea (sick to your stomach) and vomiting (throwing up).   Weight gain of 5 pounds or more per week.  Heartburn that doesn't go away.  Signs of bladder infection: pain when you urinate (use the toilet), need to go more often and more urgently.  The bag of rivera (rupture of membranes) breaks, or you notice leaking in your underwear.  Bright red blood in your underwear.  Abdominal (lower belly) or stomach pain.  For first baby: Contractions (tightening) less than 5 minutes apart for one hour or more.  Second (plus) baby: Contractions (tightening) less than 10 minutes apart and getting stronger.  *If less than 34 weeks: Contractions (tightening) more than 6 times in one hour.  Increase or change in vaginal discharge (note the color and amount)  Other:     Follow-up:  As scheduled in the clinic

## 2023-02-21 NOTE — PLAN OF CARE
Allyson arrives in the MAC thinking she may be in labor. Currently 38 weeks 5 days. Monitors applied with consent and NST obtained. SVE. Closed cervix.    1330- Dr Hamm was updated. Patient discharged to home. Will f/u in clinic this Thursday 2.23.23. Discharge instructions reviewed at length with Allyson and her . Both verbalize understanding.

## 2023-03-03 ENCOUNTER — HOSPITAL ENCOUNTER (INPATIENT)
Facility: CLINIC | Age: 38
LOS: 5 days | Discharge: HOME HEALTH | End: 2023-03-08
Attending: SPECIALIST | Admitting: SPECIALIST
Payer: COMMERCIAL

## 2023-03-03 DIAGNOSIS — D62 ANEMIA DUE TO BLOOD LOSS, ACUTE: ICD-10-CM

## 2023-03-03 DIAGNOSIS — Z98.891 S/P CESAREAN SECTION: ICD-10-CM

## 2023-03-03 PROCEDURE — 120N000001 HC R&B MED SURG/OB

## 2023-03-03 PROCEDURE — 3E0P7VZ INTRODUCTION OF HORMONE INTO FEMALE REPRODUCTIVE, VIA NATURAL OR ARTIFICIAL OPENING: ICD-10-PCS | Performed by: SPECIALIST

## 2023-03-03 RX ORDER — ONDANSETRON 2 MG/ML
4 INJECTION INTRAMUSCULAR; INTRAVENOUS EVERY 6 HOURS PRN
Status: DISCONTINUED | OUTPATIENT
Start: 2023-03-03 | End: 2023-03-05 | Stop reason: HOSPADM

## 2023-03-03 RX ORDER — NALOXONE HYDROCHLORIDE 0.4 MG/ML
0.2 INJECTION, SOLUTION INTRAMUSCULAR; INTRAVENOUS; SUBCUTANEOUS
Status: DISCONTINUED | OUTPATIENT
Start: 2023-03-03 | End: 2023-03-05 | Stop reason: HOSPADM

## 2023-03-03 RX ORDER — KETOROLAC TROMETHAMINE 30 MG/ML
30 INJECTION, SOLUTION INTRAMUSCULAR; INTRAVENOUS
Status: DISCONTINUED | OUTPATIENT
Start: 2023-03-03 | End: 2023-03-05

## 2023-03-03 RX ORDER — IBUPROFEN 400 MG/1
800 TABLET, FILM COATED ORAL
Status: DISCONTINUED | OUTPATIENT
Start: 2023-03-03 | End: 2023-03-05

## 2023-03-03 RX ORDER — MISOPROSTOL 200 UG/1
400 TABLET ORAL
Status: DISCONTINUED | OUTPATIENT
Start: 2023-03-03 | End: 2023-03-05 | Stop reason: HOSPADM

## 2023-03-03 RX ORDER — MISOPROSTOL 200 UG/1
800 TABLET ORAL
Status: DISCONTINUED | OUTPATIENT
Start: 2023-03-03 | End: 2023-03-05 | Stop reason: HOSPADM

## 2023-03-03 RX ORDER — NALOXONE HYDROCHLORIDE 0.4 MG/ML
0.4 INJECTION, SOLUTION INTRAMUSCULAR; INTRAVENOUS; SUBCUTANEOUS
Status: DISCONTINUED | OUTPATIENT
Start: 2023-03-03 | End: 2023-03-05 | Stop reason: HOSPADM

## 2023-03-03 RX ORDER — TRANEXAMIC ACID 10 MG/ML
1 INJECTION, SOLUTION INTRAVENOUS EVERY 30 MIN PRN
Status: DISCONTINUED | OUTPATIENT
Start: 2023-03-03 | End: 2023-03-05 | Stop reason: HOSPADM

## 2023-03-03 RX ORDER — OXYTOCIN 10 [USP'U]/ML
10 INJECTION, SOLUTION INTRAMUSCULAR; INTRAVENOUS
Status: DISCONTINUED | OUTPATIENT
Start: 2023-03-03 | End: 2023-03-05 | Stop reason: HOSPADM

## 2023-03-03 RX ORDER — PROCHLORPERAZINE 25 MG
25 SUPPOSITORY, RECTAL RECTAL EVERY 12 HOURS PRN
Status: DISCONTINUED | OUTPATIENT
Start: 2023-03-03 | End: 2023-03-05 | Stop reason: HOSPADM

## 2023-03-03 RX ORDER — CARBOPROST TROMETHAMINE 250 UG/ML
250 INJECTION, SOLUTION INTRAMUSCULAR
Status: DISCONTINUED | OUTPATIENT
Start: 2023-03-03 | End: 2023-03-05 | Stop reason: HOSPADM

## 2023-03-03 RX ORDER — OXYTOCIN 10 [USP'U]/ML
10 INJECTION, SOLUTION INTRAMUSCULAR; INTRAVENOUS
Status: DISCONTINUED | OUTPATIENT
Start: 2023-03-03 | End: 2023-03-08 | Stop reason: HOSPADM

## 2023-03-03 RX ORDER — OXYTOCIN/0.9 % SODIUM CHLORIDE 30/500 ML
100-340 PLASTIC BAG, INJECTION (ML) INTRAVENOUS CONTINUOUS PRN
Status: DISCONTINUED | OUTPATIENT
Start: 2023-03-03 | End: 2023-03-08 | Stop reason: HOSPADM

## 2023-03-03 RX ORDER — OXYTOCIN/0.9 % SODIUM CHLORIDE 30/500 ML
340 PLASTIC BAG, INJECTION (ML) INTRAVENOUS CONTINUOUS PRN
Status: DISCONTINUED | OUTPATIENT
Start: 2023-03-03 | End: 2023-03-05 | Stop reason: HOSPADM

## 2023-03-03 RX ORDER — ONDANSETRON 4 MG/1
4 TABLET, ORALLY DISINTEGRATING ORAL EVERY 6 HOURS PRN
Status: DISCONTINUED | OUTPATIENT
Start: 2023-03-03 | End: 2023-03-05 | Stop reason: HOSPADM

## 2023-03-03 RX ORDER — CITRIC ACID/SODIUM CITRATE 334-500MG
30 SOLUTION, ORAL ORAL
Status: DISCONTINUED | OUTPATIENT
Start: 2023-03-03 | End: 2023-03-05 | Stop reason: HOSPADM

## 2023-03-03 RX ORDER — METOCLOPRAMIDE HYDROCHLORIDE 5 MG/ML
10 INJECTION INTRAMUSCULAR; INTRAVENOUS EVERY 6 HOURS PRN
Status: DISCONTINUED | OUTPATIENT
Start: 2023-03-03 | End: 2023-03-05 | Stop reason: HOSPADM

## 2023-03-03 RX ORDER — METHYLERGONOVINE MALEATE 0.2 MG/ML
200 INJECTION INTRAVENOUS
Status: DISCONTINUED | OUTPATIENT
Start: 2023-03-03 | End: 2023-03-05 | Stop reason: HOSPADM

## 2023-03-03 RX ORDER — MISOPROSTOL 100 UG/1
25 TABLET ORAL EVERY 4 HOURS PRN
Status: DISCONTINUED | OUTPATIENT
Start: 2023-03-03 | End: 2023-03-05 | Stop reason: HOSPADM

## 2023-03-03 RX ORDER — METOCLOPRAMIDE 10 MG/1
10 TABLET ORAL EVERY 6 HOURS PRN
Status: DISCONTINUED | OUTPATIENT
Start: 2023-03-03 | End: 2023-03-05 | Stop reason: HOSPADM

## 2023-03-03 RX ORDER — PROCHLORPERAZINE MALEATE 5 MG
10 TABLET ORAL EVERY 6 HOURS PRN
Status: DISCONTINUED | OUTPATIENT
Start: 2023-03-03 | End: 2023-03-05 | Stop reason: HOSPADM

## 2023-03-03 ASSESSMENT — ACTIVITIES OF DAILY LIVING (ADL)
DOING_ERRANDS_INDEPENDENTLY_DIFFICULTY: NO
DRESSING/BATHING_DIFFICULTY: NO
TOILETING_ISSUES: NO
ADLS_ACUITY_SCORE: 18
FALL_HISTORY_WITHIN_LAST_SIX_MONTHS: NO
WALKING_OR_CLIMBING_STAIRS_DIFFICULTY: NO
CONCENTRATING,_REMEMBERING_OR_MAKING_DECISIONS_DIFFICULTY: NO
ADLS_ACUITY_SCORE: 18
WEAR_GLASSES_OR_BLIND: NO
CHANGE_IN_FUNCTIONAL_STATUS_SINCE_ONSET_OF_CURRENT_ILLNESS/INJURY: NO
DIFFICULTY_EATING/SWALLOWING: NO

## 2023-03-04 ENCOUNTER — ANESTHESIA (OUTPATIENT)
Dept: OBGYN | Facility: CLINIC | Age: 38
End: 2023-03-04
Payer: COMMERCIAL

## 2023-03-04 ENCOUNTER — ANESTHESIA EVENT (OUTPATIENT)
Dept: OBGYN | Facility: CLINIC | Age: 38
End: 2023-03-04
Payer: COMMERCIAL

## 2023-03-04 LAB
ABO/RH(D): NORMAL
ALT SERPL W P-5'-P-CCNC: 24 U/L (ref 10–35)
ANTIBODY SCREEN: NEGATIVE
AST SERPL W P-5'-P-CCNC: 23 U/L (ref 10–35)
CREAT SERPL-MCNC: 0.71 MG/DL (ref 0.51–0.95)
ERYTHROCYTE [DISTWIDTH] IN BLOOD BY AUTOMATED COUNT: 12.6 % (ref 10–15)
ERYTHROCYTE [DISTWIDTH] IN BLOOD BY AUTOMATED COUNT: 12.8 % (ref 10–15)
GFR SERPL CREATININE-BSD FRML MDRD: >90 ML/MIN/1.73M2
HCT VFR BLD AUTO: 33.6 % (ref 35–47)
HCT VFR BLD AUTO: 34.9 % (ref 35–47)
HGB BLD-MCNC: 11.6 G/DL (ref 11.7–15.7)
HGB BLD-MCNC: 11.6 G/DL (ref 11.7–15.7)
HOLD SPECIMEN: NORMAL
MCH RBC QN AUTO: 31 PG (ref 26.5–33)
MCH RBC QN AUTO: 31.3 PG (ref 26.5–33)
MCHC RBC AUTO-ENTMCNC: 33.2 G/DL (ref 31.5–36.5)
MCHC RBC AUTO-ENTMCNC: 34.5 G/DL (ref 31.5–36.5)
MCV RBC AUTO: 91 FL (ref 78–100)
MCV RBC AUTO: 93 FL (ref 78–100)
PLATELET # BLD AUTO: 201 10E3/UL (ref 150–450)
PLATELET # BLD AUTO: 228 10E3/UL (ref 150–450)
RBC # BLD AUTO: 3.71 10E6/UL (ref 3.8–5.2)
RBC # BLD AUTO: 3.74 10E6/UL (ref 3.8–5.2)
SPECIMEN EXPIRATION DATE: NORMAL
T PALLIDUM AB SER QL: NONREACTIVE
WBC # BLD AUTO: 10.5 10E3/UL (ref 4–11)
WBC # BLD AUTO: 15.5 10E3/UL (ref 4–11)

## 2023-03-04 PROCEDURE — 36415 COLL VENOUS BLD VENIPUNCTURE: CPT | Performed by: SPECIALIST

## 2023-03-04 PROCEDURE — 85014 HEMATOCRIT: CPT | Performed by: SPECIALIST

## 2023-03-04 PROCEDURE — 250N000011 HC RX IP 250 OP 636: Performed by: ANESTHESIOLOGY

## 2023-03-04 PROCEDURE — 250N000011 HC RX IP 250 OP 636: Performed by: SPECIALIST

## 2023-03-04 PROCEDURE — 3E0R3BZ INTRODUCTION OF ANESTHETIC AGENT INTO SPINAL CANAL, PERCUTANEOUS APPROACH: ICD-10-PCS | Performed by: ANESTHESIOLOGY

## 2023-03-04 PROCEDURE — 250N000013 HC RX MED GY IP 250 OP 250 PS 637: Performed by: SPECIALIST

## 2023-03-04 PROCEDURE — 82565 ASSAY OF CREATININE: CPT | Performed by: SPECIALIST

## 2023-03-04 PROCEDURE — 258N000003 HC RX IP 258 OP 636: Performed by: SPECIALIST

## 2023-03-04 PROCEDURE — 86901 BLOOD TYPING SEROLOGIC RH(D): CPT | Performed by: SPECIALIST

## 2023-03-04 PROCEDURE — 250N000009 HC RX 250: Performed by: SPECIALIST

## 2023-03-04 PROCEDURE — 10907ZC DRAINAGE OF AMNIOTIC FLUID, THERAPEUTIC FROM PRODUCTS OF CONCEPTION, VIA NATURAL OR ARTIFICIAL OPENING: ICD-10-PCS | Performed by: SPECIALIST

## 2023-03-04 PROCEDURE — 84450 TRANSFERASE (AST) (SGOT): CPT | Performed by: SPECIALIST

## 2023-03-04 PROCEDURE — 00HU33Z INSERTION OF INFUSION DEVICE INTO SPINAL CANAL, PERCUTANEOUS APPROACH: ICD-10-PCS | Performed by: ANESTHESIOLOGY

## 2023-03-04 PROCEDURE — 258N000003 HC RX IP 258 OP 636: Performed by: ANESTHESIOLOGY

## 2023-03-04 PROCEDURE — 120N000001 HC R&B MED SURG/OB

## 2023-03-04 PROCEDURE — 86780 TREPONEMA PALLIDUM: CPT | Performed by: SPECIALIST

## 2023-03-04 PROCEDURE — 84460 ALANINE AMINO (ALT) (SGPT): CPT | Performed by: SPECIALIST

## 2023-03-04 RX ORDER — EPHEDRINE SULFATE 50 MG/ML
5 INJECTION, SOLUTION INTRAMUSCULAR; INTRAVENOUS; SUBCUTANEOUS
Status: DISCONTINUED | OUTPATIENT
Start: 2023-03-04 | End: 2023-03-05 | Stop reason: HOSPADM

## 2023-03-04 RX ORDER — LIDOCAINE 40 MG/G
CREAM TOPICAL
Status: DISCONTINUED | OUTPATIENT
Start: 2023-03-04 | End: 2023-03-05 | Stop reason: HOSPADM

## 2023-03-04 RX ORDER — CALCIUM CARBONATE 500 MG/1
1000 TABLET, CHEWABLE ORAL 3 TIMES DAILY PRN
Status: DISCONTINUED | OUTPATIENT
Start: 2023-03-04 | End: 2023-03-08 | Stop reason: HOSPADM

## 2023-03-04 RX ORDER — CALCIUM CARBONATE 500 MG/1
2 TABLET, CHEWABLE ORAL 4 TIMES DAILY PRN
COMMUNITY

## 2023-03-04 RX ORDER — ROPIVACAINE HYDROCHLORIDE 2 MG/ML
INJECTION, SOLUTION EPIDURAL; INFILTRATION; PERINEURAL
Status: COMPLETED | OUTPATIENT
Start: 2023-03-04 | End: 2023-03-04

## 2023-03-04 RX ORDER — ROPIVACAINE HYDROCHLORIDE 2 MG/ML
10 INJECTION, SOLUTION EPIDURAL; INFILTRATION; PERINEURAL ONCE
Status: DISCONTINUED | OUTPATIENT
Start: 2023-03-04 | End: 2023-03-05 | Stop reason: HOSPADM

## 2023-03-04 RX ORDER — OXYTOCIN/0.9 % SODIUM CHLORIDE 30/500 ML
1-24 PLASTIC BAG, INJECTION (ML) INTRAVENOUS CONTINUOUS
Status: DISCONTINUED | OUTPATIENT
Start: 2023-03-04 | End: 2023-03-05 | Stop reason: HOSPADM

## 2023-03-04 RX ORDER — NALBUPHINE HYDROCHLORIDE 20 MG/ML
2.5-5 INJECTION, SOLUTION INTRAMUSCULAR; INTRAVENOUS; SUBCUTANEOUS EVERY 6 HOURS PRN
Status: DISCONTINUED | OUTPATIENT
Start: 2023-03-04 | End: 2023-03-08 | Stop reason: HOSPADM

## 2023-03-04 RX ORDER — SODIUM CHLORIDE, SODIUM LACTATE, POTASSIUM CHLORIDE, CALCIUM CHLORIDE 600; 310; 30; 20 MG/100ML; MG/100ML; MG/100ML; MG/100ML
INJECTION, SOLUTION INTRAVENOUS CONTINUOUS PRN
Status: DISCONTINUED | OUTPATIENT
Start: 2023-03-04 | End: 2023-03-05 | Stop reason: HOSPADM

## 2023-03-04 RX ADMIN — Medication 2 MILLI-UNITS/MIN: at 21:15

## 2023-03-04 RX ADMIN — ROPIVACAINE HYDROCHLORIDE 10 ML: 2 INJECTION, SOLUTION EPIDURAL; INFILTRATION at 13:49

## 2023-03-04 RX ADMIN — CALCIUM CARBONATE (ANTACID) CHEW TAB 500 MG 1000 MG: 500 CHEW TAB at 16:09

## 2023-03-04 RX ADMIN — SODIUM CHLORIDE, POTASSIUM CHLORIDE, SODIUM LACTATE AND CALCIUM CHLORIDE: 600; 310; 30; 20 INJECTION, SOLUTION INTRAVENOUS at 20:58

## 2023-03-04 RX ADMIN — CALCIUM CARBONATE (ANTACID) CHEW TAB 500 MG 1000 MG: 500 CHEW TAB at 02:16

## 2023-03-04 RX ADMIN — SODIUM CHLORIDE, POTASSIUM CHLORIDE, SODIUM LACTATE AND CALCIUM CHLORIDE 250 ML: 600; 310; 30; 20 INJECTION, SOLUTION INTRAVENOUS at 14:00

## 2023-03-04 RX ADMIN — METOCLOPRAMIDE 10 MG: 5 INJECTION, SOLUTION INTRAMUSCULAR; INTRAVENOUS at 23:15

## 2023-03-04 RX ADMIN — Medication 12 ML/HR: at 13:54

## 2023-03-04 RX ADMIN — MISOPROSTOL 25 MCG: 100 TABLET ORAL at 00:50

## 2023-03-04 RX ADMIN — MISOPROSTOL 25 MCG: 100 TABLET ORAL at 05:25

## 2023-03-04 RX ADMIN — Medication 12 ML/HR: at 20:55

## 2023-03-04 RX ADMIN — SODIUM CHLORIDE, POTASSIUM CHLORIDE, SODIUM LACTATE AND CALCIUM CHLORIDE 500 ML: 600; 310; 30; 20 INJECTION, SOLUTION INTRAVENOUS at 08:14

## 2023-03-04 ASSESSMENT — ACTIVITIES OF DAILY LIVING (ADL)
ADLS_ACUITY_SCORE: 18

## 2023-03-04 NOTE — H&P
"  March 3, 2023    Aicha Draper  [id number]            OB Admit History & Physical      Ms. Draper  is here for induction of labor for maternal discomfort.     She has noticed some loss of mucus plug and slight bleeding. IVF pregnancy    No LMP recorded. Patient is pregnant.   Her Estimated Date of Delivery: Mar 2, 2023  , making her 40w1d  wks.      Estimated body mass index is 28.15 kg/m  as calculated from the following:    Height as of this encounter: 1.626 m (5' 4\").    Weight as of this encounter: 74.4 kg (164 lb).  Her prenatal course has been un complicated     See prenatal for labs.  neg GBBS, Rubella  Immune, RH pos    Estimated fetal weight= 3600 gm       She is a 37 year old   Her OB history:   OB History    Para Term  AB Living   1 0 0 0 0 0   SAB IAB Ectopic Multiple Live Births   0 0 0 0 0      # Outcome Date GA Lbr Raul/2nd Weight Sex Delivery Anes PTL Lv   1 Current               Obstetric Comments   No previous pap to date Oct 19            Past Medical History:   Diagnosis Date     Atypical squamous cells of undetermined significance (ASCUS) on Papanicolaou smear of cervix 2015    ASCUS cyto, repeat pap 1 yr     In vitro fertilization      PCOS (polycystic ovarian syndrome)           Past Surgical History:   Procedure Laterality Date     CHOLECYSTECTOMY, LAPOROSCOPIC  2010    Cholecystectomy, Laparoscopic     SURGICAL HISTORY OF -       STOMACH SURGERY REMOVE HAIRBALL     ZZC APPENDECTOMY      incidental with above         No current outpatient medications on file.       Allergies: No known drug allergies      REVIEW OF SYSTEMS:  NEUROLOGIC:  Negative  EYES:  Negative  ENT:  Negative  GI:  Negative  BREAST:  Negative  :  Negative  GYN:  Negative  CV:  Negative  PULMONARY:  Negative  MUSCULOSKELETAL:  Negative  PSYCH:  Negative        Social History     Socioeconomic History     Marital status:      Spouse name: Not on file     Number of children: Not on " file     Years of education: Not on file     Highest education level: Not on file   Occupational History     Occupation: college student at  [employer]      Employer: [employer]   Tobacco Use     Smoking status: Never     Smokeless tobacco: Never   Substance and Sexual Activity     Alcohol use: Not Currently     Comment: rarely     Drug use: No     Sexual activity: Yes     Partners: Male   Other Topics Concern      Service Not Asked     Blood Transfusions Not Asked     Caffeine Concern Yes     Comment: rare     Occupational Exposure Not Asked     Hobby Hazards Not Asked     Sleep Concern Not Asked     Stress Concern Not Asked     Weight Concern Not Asked     Special Diet Not Asked     Back Care Not Asked     Exercise Yes     Comment:  tries to get regular exercise     Bike Helmet Not Asked     Seat Belt Yes     Self-Exams No     Comment: encouraged to do so.      Parent/sibling w/ CABG, MI or angioplasty before 65F 55M? No   Social History Narrative     Not on file     Social Determinants of Health     Financial Resource Strain: Not on file   Food Insecurity: Not on file   Transportation Needs: Not on file   Physical Activity: Not on file   Stress: Not on file   Social Connections: Not on file   Intimate Partner Violence: Not on file   Housing Stability: Not on file      Family History   Problem Relation Age of Onset     Arthritis Father         onset 23- 24 yrs     Hypertension Mother      Hyperlipidemia Mother      Diabetes Paternal Grandmother      Arthritis Paternal Grandmother      C.A.D. Maternal Grandmother      C.A.D. Maternal Grandfather      Prostate Cancer Maternal Grandfather      C.A.D. Paternal Grandfather      Arthritis Paternal Grandfather      Breast Cancer Other              Vitals:       Alert Awake in NAD  HEENT grossly normal  Neck: no lymphadenopathy or thryoidomegaly  Lungs WNL  Back no  spinal or CVAT  Heart WNL  ABD gravid, vertex on exam   Pelvic:  No  fluid noted  Cervix is  closed per RN exam  EXT:  no edema or calf tenderness  Neuro:  WNL    Assessment:  IUP at 40w1d  , presents for induction of labor for maternal discomfort    Plan:  Will plan cervical cytotec for ripening.       Guerda Hamm MD MD  Dept of OB/GYN  March 3, 2023

## 2023-03-04 NOTE — PROGRESS NOTES
"OB progress Note  40 2/7 weeks gestation, IOL for maternal discomfort  Has received two doses of cytotec overnight.  Reports lots of mild contractions.   Was able to get some sleep.    /84   Temp 97.9  F (36.6  C)   Resp 14   Ht 1.626 m (5' 4\")   Wt 74.4 kg (164 lb)   BMI 28.15 kg/m    Cervix per RN 1/long    Category 1 strip    Plan : continue vaginal cytotec.    Guerda Hamm MD  "

## 2023-03-04 NOTE — ANESTHESIA PROCEDURE NOTES
"Epidural catheter Procedure Note    Pre-Procedure   Staff -        Anesthesiologist:  Catracho Quinn MD       Performed By: anesthesiologist       Location: OB       Pre-Anesthestic Checklist: patient identified, IV checked, risks and benefits discussed, informed consent, monitors and equipment checked, pre-op evaluation and at physician/surgeon's request  Timeout:       Correct Patient: Yes        Correct Procedure: Yes        Correct Site: Yes        Correct Position: Yes   Procedure Documentation  Procedure: epidural catheter       Patient Position: sitting       Patient Prep/Sterile Barriers: sterile gloves, mask, patient draped       Skin prep: Betadine       Local skin infiltrated with 3 mL of 1% lidocaine.        Insertion Site: L3-4. (midline approach).       Technique: LORT saline        EMILY at 5 cm.       Needle Type: ToSensuliny needle       Needle Gauge: 17.        Needle Length (Inches): 3.5        Catheter: 19 G.          Catheter threaded easily.           Threaded 9 cm at skin.         # of attempts: 1 and  # of redirects:     Assessment/Narrative         Paresthesias: No.       Test dose of 3 mL lidocaine 1.5% w/ 1:200,000 epinephrine at 13:47 CST.         Test dose negative, 3 minutes after injection, for signs of intravascular, subdural, or intrathecal injection.       Insertion/Infusion Method: LORT saline       Aspiration negative for Heme or CSF via Epidural Catheter.    Medication(s) Administered   0.2% Ropivacaine (Epidural) - EPIDURAL   10 mL - 3/4/2023 1:49:00 PM   Comments:  No complications   Secured with Tegaderm, adhesive spray and tape          FOR [facility] ([facility] ONLY:   Pain Team Contact information: please page the Pain Team Via [employer]. Search \"Pain\". During daytime hours, please page the attending first. At night please page the resident first.    "

## 2023-03-04 NOTE — PROGRESS NOTES
Progress Note    Chucky q2 min from cytotec.   Last dose held.   Feeling more uncomfortable.   Cervix 1 cm/80/-1  AROM performed.  Clear fluid noted.     Category 1 tracing.     Discussed pain management.  Considering labor epidural.   Ok to place when desires.     Pitocin if contractions slow.     Guerda Hamm MD

## 2023-03-04 NOTE — ANESTHESIA PREPROCEDURE EVALUATION
Anesthesia Pre-Procedure Evaluation    Patient: Aicha Draper   MRN: [id number] : 1985        Procedure : * No procedures listed *          Past Medical History:   Diagnosis Date     Atypical squamous cells of undetermined significance (ASCUS) on Papanicolaou smear of cervix 2015    ASCUS cyto, repeat pap 1 yr     In vitro fertilization      PCOS (polycystic ovarian syndrome)       Past Surgical History:   Procedure Laterality Date     CHOLECYSTECTOMY, LAPOROSCOPIC  2010    Cholecystectomy, Laparoscopic     SURGICAL HISTORY OF -       STOMACH SURGERY REMOVE HAIRBALL     ZZC APPENDECTOMY      incidental with above      Allergies   Allergen Reactions     No Known Drug Allergies       Social History     Tobacco Use     Smoking status: Never     Smokeless tobacco: Never   Substance Use Topics     Alcohol use: Not Currently     Comment: rarely      Wt Readings from Last 1 Encounters:   23 74.4 kg (164 lb)        Anesthesia Evaluation            ROS/MED HX  ENT/Pulmonary:    (-) asthma   Neurologic:  - neg neurologic ROS     Cardiovascular:    (-) PIH   METS/Exercise Tolerance:     Hematologic:     (+) no anticoagulation therapy, no coagulopathy,     Musculoskeletal:       GI/Hepatic:     (+) GERD,     Renal/Genitourinary:       Endo:       Psychiatric/Substance Use:       Infectious Disease:       Malignancy:       Other:            Physical Exam    Airway        Mallampati: II   TM distance: > 3 FB   Neck ROM: full     Respiratory Devices and Support         Dental  no notable dental history         Cardiovascular   cardiovascular exam normal          Pulmonary   pulmonary exam normal                OUTSIDE LABS:  CBC:   Lab Results   Component Value Date    WBC 10.5 2023    WBC 9.6 2020    HGB 11.6 (L) 2023    HGB 14.8 2020    HCT 34.9 (L) 2023    HCT 43.6 2020     2023     2020     BMP:   Lab Results   Component Value Date      02/26/2020     12/21/2015    POTASSIUM 4.3 02/26/2020    POTASSIUM 3.9 12/21/2015    CHLORIDE 108 02/26/2020    CHLORIDE 105 12/21/2015    CO2 23 02/26/2020    CO2 29 12/21/2015    BUN 13 02/26/2020    BUN 10 12/21/2015    CR 0.79 02/26/2020    CR 0.84 12/21/2015    GLC 83 02/26/2020    GLC 83 12/21/2015     COAGS: No results found for: PTT, INR, FIBR  POC:   Lab Results   Component Value Date    HCG  01/27/2010     Negative   This test provides a presumptive diagnosis of pregnancy or non-pregnancy. A   confirmed pregnancy diagnosis should only be made by a physician after all   clinical and laboratory findings have been evaluated.     HEPATIC:   Lab Results   Component Value Date    ALBUMIN 3.7 02/26/2020    PROTTOTAL 7.5 02/26/2020    ALT 15 02/26/2020    AST 9 02/26/2020    ALKPHOS 44 02/26/2020    BILITOTAL 0.4 02/26/2020     OTHER:   Lab Results   Component Value Date    DEMETRICE 9.1 02/26/2020    LIPASE 186 05/11/2011    TSH 3.05 09/19/2011    T4 0.98 10/19/2009    SED 6 09/19/2011       Anesthesia Plan    ASA Status:  2      Anesthesia Type: Epidural.              Consents    Anesthesia Plan(s) and associated risks, benefits, and realistic alternatives discussed. Questions answered and patient/representative(s) expressed understanding.    - Discussed:     - Discussed with:  Patient         Postoperative Care            Comments:    Other Comments: Orders to manage the epidural infusion have been entered, and through coordination with the nurse, we will continute to manage and monitor the patient's labor epidural.  We will continuously be available to adjust as needed thruout the entire L&D process.     Converting to C section, epidural working well            Catracho Quinn MD

## 2023-03-04 NOTE — PLAN OF CARE
Dr Hamm was paged by me and notified that I held the last cytotec due to tachysystole. Will do a SVE to determine the plan going forward.

## 2023-03-05 PROCEDURE — 370N000017 HC ANESTHESIA TECHNICAL FEE, PER MIN: Performed by: SPECIALIST

## 2023-03-05 PROCEDURE — 258N000003 HC RX IP 258 OP 636: Performed by: NURSE ANESTHETIST, CERTIFIED REGISTERED

## 2023-03-05 PROCEDURE — 250N000011 HC RX IP 250 OP 636: Performed by: SPECIALIST

## 2023-03-05 PROCEDURE — 250N000009 HC RX 250: Performed by: SPECIALIST

## 2023-03-05 PROCEDURE — 250N000009 HC RX 250: Performed by: NURSE ANESTHETIST, CERTIFIED REGISTERED

## 2023-03-05 PROCEDURE — 360N000076 HC SURGERY LEVEL 3, PER MIN: Performed by: SPECIALIST

## 2023-03-05 PROCEDURE — 250N000011 HC RX IP 250 OP 636: Performed by: ANESTHESIOLOGY

## 2023-03-05 PROCEDURE — 120N000012 HC R&B POSTPARTUM

## 2023-03-05 PROCEDURE — 250N000011 HC RX IP 250 OP 636: Performed by: NURSE ANESTHETIST, CERTIFIED REGISTERED

## 2023-03-05 PROCEDURE — 272N000001 HC OR GENERAL SUPPLY STERILE: Performed by: SPECIALIST

## 2023-03-05 PROCEDURE — 250N000013 HC RX MED GY IP 250 OP 250 PS 637

## 2023-03-05 PROCEDURE — 710N000009 HC RECOVERY PHASE 1, LEVEL 1, PER MIN: Performed by: SPECIALIST

## 2023-03-05 PROCEDURE — 250N000013 HC RX MED GY IP 250 OP 250 PS 637: Performed by: SPECIALIST

## 2023-03-05 PROCEDURE — 258N000003 HC RX IP 258 OP 636: Performed by: SPECIALIST

## 2023-03-05 RX ORDER — CITRIC ACID/SODIUM CITRATE 334-500MG
30 SOLUTION, ORAL ORAL
Status: COMPLETED | OUTPATIENT
Start: 2023-03-05 | End: 2023-03-05

## 2023-03-05 RX ORDER — AZITHROMYCIN 500 MG/1
500 INJECTION, POWDER, LYOPHILIZED, FOR SOLUTION INTRAVENOUS
Status: COMPLETED | OUTPATIENT
Start: 2023-03-05 | End: 2023-03-05

## 2023-03-05 RX ORDER — AMOXICILLIN 250 MG
1 CAPSULE ORAL 2 TIMES DAILY
Status: DISCONTINUED | OUTPATIENT
Start: 2023-03-05 | End: 2023-03-08 | Stop reason: HOSPADM

## 2023-03-05 RX ORDER — ONDANSETRON 4 MG/1
4 TABLET, ORALLY DISINTEGRATING ORAL EVERY 6 HOURS PRN
Status: DISCONTINUED | OUTPATIENT
Start: 2023-03-05 | End: 2023-03-08 | Stop reason: HOSPADM

## 2023-03-05 RX ORDER — MISOPROSTOL 200 UG/1
800 TABLET ORAL
Status: DISCONTINUED | OUTPATIENT
Start: 2023-03-05 | End: 2023-03-05 | Stop reason: HOSPADM

## 2023-03-05 RX ORDER — MISOPROSTOL 200 UG/1
400 TABLET ORAL
Status: DISCONTINUED | OUTPATIENT
Start: 2023-03-05 | End: 2023-03-08 | Stop reason: HOSPADM

## 2023-03-05 RX ORDER — CARBOPROST TROMETHAMINE 250 UG/ML
250 INJECTION, SOLUTION INTRAMUSCULAR
Status: DISCONTINUED | OUTPATIENT
Start: 2023-03-05 | End: 2023-03-08 | Stop reason: HOSPADM

## 2023-03-05 RX ORDER — OXYTOCIN/0.9 % SODIUM CHLORIDE 30/500 ML
340 PLASTIC BAG, INJECTION (ML) INTRAVENOUS CONTINUOUS PRN
Status: DISCONTINUED | OUTPATIENT
Start: 2023-03-05 | End: 2023-03-08 | Stop reason: HOSPADM

## 2023-03-05 RX ORDER — SIMETHICONE 80 MG
80 TABLET,CHEWABLE ORAL 4 TIMES DAILY PRN
Status: DISCONTINUED | OUTPATIENT
Start: 2023-03-05 | End: 2023-03-08 | Stop reason: HOSPADM

## 2023-03-05 RX ORDER — MISOPROSTOL 200 UG/1
800 TABLET ORAL
Status: DISCONTINUED | OUTPATIENT
Start: 2023-03-05 | End: 2023-03-08 | Stop reason: HOSPADM

## 2023-03-05 RX ORDER — LIDOCAINE 40 MG/G
CREAM TOPICAL
Status: DISCONTINUED | OUTPATIENT
Start: 2023-03-05 | End: 2023-03-05 | Stop reason: HOSPADM

## 2023-03-05 RX ORDER — AMOXICILLIN 250 MG
2 CAPSULE ORAL 2 TIMES DAILY
Status: DISCONTINUED | OUTPATIENT
Start: 2023-03-05 | End: 2023-03-08 | Stop reason: HOSPADM

## 2023-03-05 RX ORDER — ACETAMINOPHEN 325 MG/1
975 TABLET ORAL ONCE
Status: COMPLETED | OUTPATIENT
Start: 2023-03-05 | End: 2023-03-05

## 2023-03-05 RX ORDER — CARBOPROST TROMETHAMINE 250 UG/ML
INJECTION, SOLUTION INTRAMUSCULAR PRN
Status: DISCONTINUED | OUTPATIENT
Start: 2023-03-05 | End: 2023-03-05

## 2023-03-05 RX ORDER — HYDROCORTISONE 25 MG/G
CREAM TOPICAL 3 TIMES DAILY PRN
Status: DISCONTINUED | OUTPATIENT
Start: 2023-03-05 | End: 2023-03-08 | Stop reason: HOSPADM

## 2023-03-05 RX ORDER — NALOXONE HYDROCHLORIDE 0.4 MG/ML
0.4 INJECTION, SOLUTION INTRAMUSCULAR; INTRAVENOUS; SUBCUTANEOUS
Status: DISCONTINUED | OUTPATIENT
Start: 2023-03-05 | End: 2023-03-08 | Stop reason: HOSPADM

## 2023-03-05 RX ORDER — LIDOCAINE HCL/EPINEPHRINE/PF 2%-1:200K
VIAL (ML) INJECTION PRN
Status: DISCONTINUED | OUTPATIENT
Start: 2023-03-05 | End: 2023-03-05

## 2023-03-05 RX ORDER — CEFAZOLIN SODIUM 2 G/100ML
2 INJECTION, SOLUTION INTRAVENOUS
Status: COMPLETED | OUTPATIENT
Start: 2023-03-05 | End: 2023-03-05

## 2023-03-05 RX ORDER — ONDANSETRON 2 MG/ML
4 INJECTION INTRAMUSCULAR; INTRAVENOUS EVERY 6 HOURS PRN
Status: DISCONTINUED | OUTPATIENT
Start: 2023-03-05 | End: 2023-03-08 | Stop reason: HOSPADM

## 2023-03-05 RX ORDER — METOCLOPRAMIDE 10 MG/1
10 TABLET ORAL EVERY 6 HOURS PRN
Status: DISCONTINUED | OUTPATIENT
Start: 2023-03-05 | End: 2023-03-08 | Stop reason: HOSPADM

## 2023-03-05 RX ORDER — KETOROLAC TROMETHAMINE 30 MG/ML
INJECTION, SOLUTION INTRAMUSCULAR; INTRAVENOUS PRN
Status: DISCONTINUED | OUTPATIENT
Start: 2023-03-05 | End: 2023-03-05

## 2023-03-05 RX ORDER — OXYTOCIN 10 [USP'U]/ML
10 INJECTION, SOLUTION INTRAMUSCULAR; INTRAVENOUS
Status: DISCONTINUED | OUTPATIENT
Start: 2023-03-05 | End: 2023-03-05 | Stop reason: HOSPADM

## 2023-03-05 RX ORDER — IBUPROFEN 400 MG/1
800 TABLET, FILM COATED ORAL EVERY 6 HOURS
Status: DISCONTINUED | OUTPATIENT
Start: 2023-03-06 | End: 2023-03-08 | Stop reason: HOSPADM

## 2023-03-05 RX ORDER — NALOXONE HYDROCHLORIDE 0.4 MG/ML
0.2 INJECTION, SOLUTION INTRAMUSCULAR; INTRAVENOUS; SUBCUTANEOUS
Status: DISCONTINUED | OUTPATIENT
Start: 2023-03-05 | End: 2023-03-08 | Stop reason: HOSPADM

## 2023-03-05 RX ORDER — METHYLERGONOVINE MALEATE 0.2 MG/ML
200 INJECTION INTRAVENOUS
Status: DISCONTINUED | OUTPATIENT
Start: 2023-03-05 | End: 2023-03-05 | Stop reason: HOSPADM

## 2023-03-05 RX ORDER — OXYCODONE HYDROCHLORIDE 5 MG/1
5 TABLET ORAL EVERY 4 HOURS PRN
Status: DISCONTINUED | OUTPATIENT
Start: 2023-03-05 | End: 2023-03-08 | Stop reason: HOSPADM

## 2023-03-05 RX ORDER — TRANEXAMIC ACID 10 MG/ML
1 INJECTION, SOLUTION INTRAVENOUS EVERY 30 MIN PRN
Status: DISCONTINUED | OUTPATIENT
Start: 2023-03-05 | End: 2023-03-08 | Stop reason: HOSPADM

## 2023-03-05 RX ORDER — MISOPROSTOL 200 UG/1
400 TABLET ORAL
Status: DISCONTINUED | OUTPATIENT
Start: 2023-03-05 | End: 2023-03-05 | Stop reason: HOSPADM

## 2023-03-05 RX ORDER — OXYTOCIN/0.9 % SODIUM CHLORIDE 30/500 ML
340 PLASTIC BAG, INJECTION (ML) INTRAVENOUS CONTINUOUS PRN
Status: COMPLETED | OUTPATIENT
Start: 2023-03-05 | End: 2023-03-05

## 2023-03-05 RX ORDER — CEFAZOLIN SODIUM/WATER 2 G/20 ML
SYRINGE (ML) INTRAVENOUS
Status: DISCONTINUED
Start: 2023-03-05 | End: 2023-03-05 | Stop reason: HOSPADM

## 2023-03-05 RX ORDER — MODIFIED LANOLIN
OINTMENT (GRAM) TOPICAL
Status: DISCONTINUED | OUTPATIENT
Start: 2023-03-05 | End: 2023-03-08 | Stop reason: HOSPADM

## 2023-03-05 RX ORDER — OXYTOCIN 10 [USP'U]/ML
10 INJECTION, SOLUTION INTRAMUSCULAR; INTRAVENOUS
Status: DISCONTINUED | OUTPATIENT
Start: 2023-03-05 | End: 2023-03-08 | Stop reason: HOSPADM

## 2023-03-05 RX ORDER — OXYTOCIN/0.9 % SODIUM CHLORIDE 30/500 ML
100-340 PLASTIC BAG, INJECTION (ML) INTRAVENOUS CONTINUOUS PRN
Status: DISCONTINUED | OUTPATIENT
Start: 2023-03-05 | End: 2023-03-05

## 2023-03-05 RX ORDER — KETOROLAC TROMETHAMINE 30 MG/ML
30 INJECTION, SOLUTION INTRAMUSCULAR; INTRAVENOUS
Status: DISCONTINUED | OUTPATIENT
Start: 2023-03-05 | End: 2023-03-08 | Stop reason: HOSPADM

## 2023-03-05 RX ORDER — LIDOCAINE 40 MG/G
CREAM TOPICAL
Status: DISCONTINUED | OUTPATIENT
Start: 2023-03-05 | End: 2023-03-08 | Stop reason: HOSPADM

## 2023-03-05 RX ORDER — METHYLERGONOVINE MALEATE 0.2 MG/ML
200 INJECTION INTRAVENOUS
Status: DISCONTINUED | OUTPATIENT
Start: 2023-03-05 | End: 2023-03-08 | Stop reason: HOSPADM

## 2023-03-05 RX ORDER — AZITHROMYCIN 500 MG/1
INJECTION, POWDER, LYOPHILIZED, FOR SOLUTION INTRAVENOUS
Status: DISCONTINUED
Start: 2023-03-05 | End: 2023-03-05 | Stop reason: HOSPADM

## 2023-03-05 RX ORDER — PROCHLORPERAZINE 25 MG
25 SUPPOSITORY, RECTAL RECTAL EVERY 12 HOURS PRN
Status: DISCONTINUED | OUTPATIENT
Start: 2023-03-05 | End: 2023-03-08 | Stop reason: HOSPADM

## 2023-03-05 RX ORDER — MORPHINE SULFATE 1 MG/ML
INJECTION, SOLUTION EPIDURAL; INTRATHECAL; INTRAVENOUS PRN
Status: DISCONTINUED | OUTPATIENT
Start: 2023-03-05 | End: 2023-03-05

## 2023-03-05 RX ORDER — CARBOPROST TROMETHAMINE 250 UG/ML
250 INJECTION, SOLUTION INTRAMUSCULAR
Status: DISCONTINUED | OUTPATIENT
Start: 2023-03-05 | End: 2023-03-05 | Stop reason: HOSPADM

## 2023-03-05 RX ORDER — DEXTROSE, SODIUM CHLORIDE, SODIUM LACTATE, POTASSIUM CHLORIDE, AND CALCIUM CHLORIDE 5; .6; .31; .03; .02 G/100ML; G/100ML; G/100ML; G/100ML; G/100ML
INJECTION, SOLUTION INTRAVENOUS CONTINUOUS
Status: DISCONTINUED | OUTPATIENT
Start: 2023-03-05 | End: 2023-03-08 | Stop reason: HOSPADM

## 2023-03-05 RX ORDER — CEFAZOLIN SODIUM 2 G/100ML
2 INJECTION, SOLUTION INTRAVENOUS SEE ADMIN INSTRUCTIONS
Status: DISCONTINUED | OUTPATIENT
Start: 2023-03-05 | End: 2023-03-05

## 2023-03-05 RX ORDER — KETOROLAC TROMETHAMINE 30 MG/ML
30 INJECTION, SOLUTION INTRAMUSCULAR; INTRAVENOUS EVERY 6 HOURS
Status: DISPENSED | OUTPATIENT
Start: 2023-03-05 | End: 2023-03-06

## 2023-03-05 RX ORDER — PROCHLORPERAZINE MALEATE 10 MG
10 TABLET ORAL EVERY 6 HOURS PRN
Status: DISCONTINUED | OUTPATIENT
Start: 2023-03-05 | End: 2023-03-08 | Stop reason: HOSPADM

## 2023-03-05 RX ORDER — BISACODYL 10 MG
10 SUPPOSITORY, RECTAL RECTAL DAILY PRN
Status: DISCONTINUED | OUTPATIENT
Start: 2023-03-07 | End: 2023-03-08 | Stop reason: HOSPADM

## 2023-03-05 RX ORDER — OXYTOCIN 10 [USP'U]/ML
10 INJECTION, SOLUTION INTRAMUSCULAR; INTRAVENOUS
Status: DISCONTINUED | OUTPATIENT
Start: 2023-03-05 | End: 2023-03-05

## 2023-03-05 RX ORDER — ACETAMINOPHEN 325 MG/1
975 TABLET ORAL EVERY 6 HOURS
Status: DISCONTINUED | OUTPATIENT
Start: 2023-03-05 | End: 2023-03-05

## 2023-03-05 RX ORDER — IBUPROFEN 400 MG/1
800 TABLET, FILM COATED ORAL
Status: DISCONTINUED | OUTPATIENT
Start: 2023-03-05 | End: 2023-03-08 | Stop reason: HOSPADM

## 2023-03-05 RX ORDER — ACETAMINOPHEN 325 MG/1
975 TABLET ORAL EVERY 6 HOURS
Status: DISCONTINUED | OUTPATIENT
Start: 2023-03-05 | End: 2023-03-08 | Stop reason: HOSPADM

## 2023-03-05 RX ORDER — ACETAMINOPHEN 325 MG/1
TABLET ORAL
Status: COMPLETED
Start: 2023-03-05 | End: 2023-03-05

## 2023-03-05 RX ORDER — ONDANSETRON 2 MG/ML
INJECTION INTRAMUSCULAR; INTRAVENOUS PRN
Status: DISCONTINUED | OUTPATIENT
Start: 2023-03-05 | End: 2023-03-05

## 2023-03-05 RX ORDER — TRANEXAMIC ACID 10 MG/ML
1 INJECTION, SOLUTION INTRAVENOUS EVERY 30 MIN PRN
Status: DISCONTINUED | OUTPATIENT
Start: 2023-03-05 | End: 2023-03-05 | Stop reason: HOSPADM

## 2023-03-05 RX ORDER — CITRIC ACID/SODIUM CITRATE 334-500MG
SOLUTION, ORAL ORAL
Status: COMPLETED
Start: 2023-03-05 | End: 2023-03-05

## 2023-03-05 RX ORDER — METOCLOPRAMIDE HYDROCHLORIDE 5 MG/ML
10 INJECTION INTRAMUSCULAR; INTRAVENOUS EVERY 6 HOURS PRN
Status: DISCONTINUED | OUTPATIENT
Start: 2023-03-05 | End: 2023-03-08 | Stop reason: HOSPADM

## 2023-03-05 RX ORDER — SODIUM CHLORIDE, SODIUM LACTATE, POTASSIUM CHLORIDE, CALCIUM CHLORIDE 600; 310; 30; 20 MG/100ML; MG/100ML; MG/100ML; MG/100ML
INJECTION, SOLUTION INTRAVENOUS CONTINUOUS
Status: DISCONTINUED | OUTPATIENT
Start: 2023-03-05 | End: 2023-03-05 | Stop reason: HOSPADM

## 2023-03-05 RX ADMIN — Medication 340 ML/HR: at 08:24

## 2023-03-05 RX ADMIN — ONDANSETRON 4 MG: 2 INJECTION INTRAMUSCULAR; INTRAVENOUS at 07:11

## 2023-03-05 RX ADMIN — Medication 340 ML/HR: at 07:34

## 2023-03-05 RX ADMIN — ACETAMINOPHEN 975 MG: 325 TABLET ORAL at 21:36

## 2023-03-05 RX ADMIN — PHENYLEPHRINE HYDROCHLORIDE 0.25 MCG/KG/MIN: 10 INJECTION INTRAVENOUS at 07:12

## 2023-03-05 RX ADMIN — LIDOCAINE HYDROCHLORIDE,EPINEPHRINE BITARTRATE 5 ML: 20; .005 INJECTION, SOLUTION EPIDURAL; INFILTRATION; INTRACAUDAL; PERINEURAL at 08:13

## 2023-03-05 RX ADMIN — AZITHROMYCIN MONOHYDRATE 500 MG: 500 INJECTION, POWDER, LYOPHILIZED, FOR SOLUTION INTRAVENOUS at 07:07

## 2023-03-05 RX ADMIN — KETOROLAC TROMETHAMINE 30 MG: 30 INJECTION, SOLUTION INTRAMUSCULAR at 08:12

## 2023-03-05 RX ADMIN — ACETAMINOPHEN 975 MG: 325 TABLET ORAL at 07:04

## 2023-03-05 RX ADMIN — LIDOCAINE HYDROCHLORIDE,EPINEPHRINE BITARTRATE 5 ML: 20; .005 INJECTION, SOLUTION EPIDURAL; INFILTRATION; INTRACAUDAL; PERINEURAL at 07:18

## 2023-03-05 RX ADMIN — ACETAMINOPHEN 975 MG: 325 TABLET ORAL at 13:24

## 2023-03-05 RX ADMIN — SODIUM CITRATE AND CITRIC ACID MONOHYDRATE 30 ML: 500; 334 SOLUTION ORAL at 07:06

## 2023-03-05 RX ADMIN — Medication 30 ML: at 07:06

## 2023-03-05 RX ADMIN — MORPHINE SULFATE 2 MG: 1 INJECTION, SOLUTION EPIDURAL; INTRATHECAL; INTRAVENOUS at 08:12

## 2023-03-05 RX ADMIN — CARBOPROST TROMETHAMINE 250 MCG: 250 INJECTION, SOLUTION INTRAMUSCULAR at 07:36

## 2023-03-05 RX ADMIN — SODIUM CHLORIDE, SODIUM LACTATE, POTASSIUM CHLORIDE, CALCIUM CHLORIDE AND DEXTROSE MONOHYDRATE: 5; 600; 310; 30; 20 INJECTION, SOLUTION INTRAVENOUS at 11:50

## 2023-03-05 RX ADMIN — CEFAZOLIN SODIUM 2 G: 2 INJECTION, SOLUTION INTRAVENOUS at 07:07

## 2023-03-05 RX ADMIN — Medication 12 ML/HR: at 03:48

## 2023-03-05 RX ADMIN — KETOROLAC TROMETHAMINE 30 MG: 30 INJECTION, SOLUTION INTRAMUSCULAR at 16:22

## 2023-03-05 RX ADMIN — SODIUM CHLORIDE, POTASSIUM CHLORIDE, SODIUM LACTATE AND CALCIUM CHLORIDE: 600; 310; 30; 20 INJECTION, SOLUTION INTRAVENOUS at 05:58

## 2023-03-05 RX ADMIN — OXYCODONE HYDROCHLORIDE 5 MG: 5 TABLET ORAL at 13:24

## 2023-03-05 RX ADMIN — SENNOSIDES AND DOCUSATE SODIUM 1 TABLET: 50; 8.6 TABLET ORAL at 21:32

## 2023-03-05 RX ADMIN — KETOROLAC TROMETHAMINE 30 MG: 30 INJECTION, SOLUTION INTRAMUSCULAR at 22:16

## 2023-03-05 RX ADMIN — LIDOCAINE HYDROCHLORIDE,EPINEPHRINE BITARTRATE 10 ML: 20; .005 INJECTION, SOLUTION EPIDURAL; INFILTRATION; INTRACAUDAL; PERINEURAL at 07:04

## 2023-03-05 ASSESSMENT — ACTIVITIES OF DAILY LIVING (ADL)
ADLS_ACUITY_SCORE: 22
ADLS_ACUITY_SCORE: 18
ADLS_ACUITY_SCORE: 22
ADLS_ACUITY_SCORE: 18
ADLS_ACUITY_SCORE: 22
ADLS_ACUITY_SCORE: 22

## 2023-03-05 NOTE — PROCEDURES
[facility],   Section Operative Note    Indications: Failed induction, failed vacuum, non reassuring fetal status  Gestational age: 40w3d    Pre-operative Diagnosis: failed induction, failed vacuum, non reassuring fetal status  Post-operative Diagnosis: Same    Procedure Details:  The patient was seen in the Holding Room. The risks, benefits, complications, treatment options, and expected outcomes were discussed with the patient.  The patient concurred with the proposed plan, giving informed consent.  The site of surgery properly noted/marked. The patient was taken to Operating Room # 1, identified as Aicha Draper and the procedure verified as  Delivery. A Time Out was held and the above information confirmed.    A fetal pillow was placed to elevate the vertex.     After induction of anesthesia, the patient was draped and prepped in the usual sterile manner. A Pfannenstiel incision was made and carried down through the subcutaneous tissue to the fascia. Fascial incision was made and extended transversely. The fascia was  from the underlying rectus tissue superiorly and inferiorly. The peritoneum was identified and entered. Peritoneal incision was extended longitudinally. The utero-vesical peritoneal reflection was incised transversely and the bladder flap was bluntly freed from the lower uterine segment. A low transverse uterine incision was made. Delivered from vertex presentation was a 8 lb 1 oz  male with Apgar scores of 8 and 9 at one minute and and five minutes. After the umbilical cord was clamped and cut cord blood was obtained for evaluation. The placenta was removed intact and appeared normal.   The uterus was exteriorized.    Dr. Schwab was asked to scrub in and assist the procedure.    The uterine outline, tubes and ovaries appeared normal. The uterine incision was closed with running locked sutures of 0 Vicryl.  A second 0  monocryl was used for imbrication.   Several figure of x sutures were used for hemostasis.  Hemostasis was observed. Lavage was carried out until clear.  The uterus was replaced into the abdominal cavity.  Bobby powder was placed over the incision.  The fascia was then reapproximated with running sutures of 0 Vicryl. The skin was reapproximated with staples.    Instrument, sponge, and needle counts were correct prior the abdominal closure and at the conclusion of the case.     Findings:  Male , 8 lb 1 oz    Estimated Blood Loss:  * No blood loss amount entered *     Total IV Fluids: (See anesthesia record)     Drains: Daniels catheter        Specimens: None           Implants: None           Complications:  None           Disposition: To recovery           Condition: Stable    Attending Attestation: Guerda Hamm MD

## 2023-03-05 NOTE — ANESTHESIA CARE TRANSFER NOTE
Patient: Aicha Draper    Procedure: Procedure(s):   section       Diagnosis: * No pre-op diagnosis entered *  Diagnosis Additional Information: No value filed.    Anesthesia Type:   Epidural     Note:    Oropharynx: oropharynx clear of all foreign objects and spontaneously breathing  Level of Consciousness: awake  Oxygen Supplementation: room air    Independent Airway: airway patency satisfactory and stable  Dentition: dentition unchanged  Vital Signs Stable: post-procedure vital signs reviewed and stable  Report to RN Given: handoff report given  Patient transferred to: Labor and Delivery  Comments: Pt awake and able to verbalize needs. VSS. All monitors on and audible. Spontaneous respiration without difficulty. o2 sats 98% on RA. Pt denies pain. Report given to PACU RN.  Handoff Report: Identifed the Patient, Identified the Reponsible Provider, Reviewed the pertinent medical history, Discussed the surgical course, Reviewed Intra-OP anesthesia mangement and issues during anesthesia, Set expectations for post-procedure period and Allowed opportunity for questions and acknowledgement of understanding      Vitals:  Vitals Value Taken Time   BP     Temp     Pulse     Resp     SpO2         Electronically Signed By: ALLYSON Vasquez CRNA  2023  8:32 AM

## 2023-03-05 NOTE — PROGRESS NOTES
OB      with induction of labor for maternal discomfort.   Slow progress to complete.   Has been pushing for greater than 1.5 hours.   Late decels with pushing with fetal tachycardia  Vertex at 2+ station.  Attempt at vacuum after emptying on bladder.   Consented for vacuum.  Three pulls attempted with no further descent of vertex.     Attempted vacuum discontinued.     Patient then consented for  section.  Risks discussed  Including bleeding, infection, injury to the internal organs.   Bowel, bladder and ureters.   Also discussed possible transfusion.     Will proceed with  section for failed induction. failed vacuum and fetal intolerance to labor.     Guerda Hamm MD

## 2023-03-05 NOTE — ANESTHESIA POSTPROCEDURE EVALUATION
Patient: Aicha Draper    Procedure: Procedure(s):   section       Anesthesia Type:  Epidural    Note:     Postop Pain Control: Uneventful            Sign Out: Well controlled pain   PONV: No   Neuro/Psych: Uneventful            Sign Out: Acceptable/Baseline neuro status   Airway/Respiratory: Uneventful            Sign Out: Acceptable/Baseline resp. status   CV/Hemodynamics: Uneventful            Sign Out: Acceptable CV status; No obvious hypovolemia; No obvious fluid overload   Other NRE: NONE   DID A NON-ROUTINE EVENT OCCUR? No           Last vitals:  Vitals Value Taken Time   /63 23 0900   Temp     Pulse 96 23 0900   Resp 22 23 0900   SpO2 98 % 23       Electronically Signed By: Catracho Quinn MD  2023  9:44 AM

## 2023-03-05 NOTE — PLAN OF CARE
Allyson and her  son were transferred to room 433 via cart. Report was then given to Nannette NEVAREZ who will now assume care.

## 2023-03-06 LAB — HGB BLD-MCNC: 8.1 G/DL (ref 11.7–15.7)

## 2023-03-06 PROCEDURE — 36415 COLL VENOUS BLD VENIPUNCTURE: CPT | Performed by: SPECIALIST

## 2023-03-06 PROCEDURE — 250N000013 HC RX MED GY IP 250 OP 250 PS 637

## 2023-03-06 PROCEDURE — 120N000012 HC R&B POSTPARTUM

## 2023-03-06 PROCEDURE — 250N000013 HC RX MED GY IP 250 OP 250 PS 637: Performed by: OBSTETRICS & GYNECOLOGY

## 2023-03-06 PROCEDURE — 85018 HEMOGLOBIN: CPT | Performed by: SPECIALIST

## 2023-03-06 PROCEDURE — 250N000013 HC RX MED GY IP 250 OP 250 PS 637: Performed by: SPECIALIST

## 2023-03-06 RX ORDER — FERROUS SULFATE 325(65) MG
325 TABLET ORAL DAILY
Status: DISCONTINUED | OUTPATIENT
Start: 2023-03-06 | End: 2023-03-08 | Stop reason: HOSPADM

## 2023-03-06 RX ADMIN — IBUPROFEN 800 MG: 400 TABLET ORAL at 04:28

## 2023-03-06 RX ADMIN — FERROUS SULFATE TAB 325 MG (65 MG ELEMENTAL FE) 325 MG: 325 (65 FE) TAB at 10:30

## 2023-03-06 RX ADMIN — OXYCODONE HYDROCHLORIDE 5 MG: 5 TABLET ORAL at 14:20

## 2023-03-06 RX ADMIN — ACETAMINOPHEN 975 MG: 325 TABLET ORAL at 04:28

## 2023-03-06 RX ADMIN — ACETAMINOPHEN 975 MG: 325 TABLET ORAL at 10:30

## 2023-03-06 RX ADMIN — ACETAMINOPHEN 975 MG: 325 TABLET ORAL at 23:41

## 2023-03-06 RX ADMIN — IBUPROFEN 800 MG: 400 TABLET ORAL at 16:58

## 2023-03-06 RX ADMIN — IBUPROFEN 800 MG: 400 TABLET ORAL at 10:30

## 2023-03-06 RX ADMIN — IBUPROFEN 800 MG: 400 TABLET ORAL at 23:41

## 2023-03-06 RX ADMIN — SENNOSIDES AND DOCUSATE SODIUM 2 TABLET: 50; 8.6 TABLET ORAL at 08:12

## 2023-03-06 RX ADMIN — ACETAMINOPHEN 975 MG: 325 TABLET ORAL at 16:58

## 2023-03-06 RX ADMIN — SENNOSIDES AND DOCUSATE SODIUM 1 TABLET: 50; 8.6 TABLET ORAL at 20:17

## 2023-03-06 RX ADMIN — OXYCODONE HYDROCHLORIDE 5 MG: 5 TABLET ORAL at 20:18

## 2023-03-06 ASSESSMENT — ACTIVITIES OF DAILY LIVING (ADL)
ADLS_ACUITY_SCORE: 21
ADLS_ACUITY_SCORE: 18
ADLS_ACUITY_SCORE: 21
ADLS_ACUITY_SCORE: 18
ADLS_ACUITY_SCORE: 21
ADLS_ACUITY_SCORE: 21
ADLS_ACUITY_SCORE: 18
ADLS_ACUITY_SCORE: 21

## 2023-03-06 NOTE — PROGRESS NOTES
[facility]  Obstetrics Post-Op / Progress Note         Assessment and Plan:    Assessment:   Post-operative day #1  Low transverse primary  section  L&D complications: Failed vacuum      Doing well.  Clean wound without signs of infection.  Normal healing wound.  No immediate surgical complications identified.  No excessive bleeding  Pain well-controlled.      Plan:   Ambulation encouraged  Pain control measures as needed            Interval History:   Doing well.  Pain is well-controlled.  No fevers.  No history of wound drainage, warmth or significant erythema.  Good appetite.  Denies chest pain, shortness of breath, nausea or vomiting.  Ambulatory.  Breastfeeding well.          Significant Problems:      Past Medical History:   Diagnosis Date     Atypical squamous cells of undetermined significance (ASCUS) on Papanicolaou smear of cervix 2015    ASCUS cyto, repeat pap 1 yr     In vitro fertilization      PCOS (polycystic ovarian syndrome)              Review of Systems:    The patient denies any chest pain, shortness of breath, excessive pain, fever, chills, purulent drainage from the wound, nausea or vomiting.          Medications:   All medications related to the patient's surgery have been reviewed          Physical Exam:     All vitals stable  Patient Vitals for the past 12 hrs:   BP Temp Temp src Pulse Resp SpO2   23 0430 107/70 97.7  F (36.5  C) Axillary 82 16 100 %   23 0008 112/63 97.8  F (36.6  C) Oral 72 16 99 %     Dressing clean and dry with minimal or no drainage.  Surrounding skin with minimal erythema.  Ext: NT          Data:     All laboratory data related to this surgery reviewed  Hemoglobin   Date Value Ref Range Status   2023 11.6 (L) 11.7 - 15.7 g/dL Final   2023 11.6 (L) 11.7 - 15.7 g/dL Final   2020 14.8 11.7 - 15.7 g/dL Final   2015 14.9 11.7 - 15.7 g/dL Final   2011 13.8 11.7 - 15.7 g/dL Final   2011 13.9  11.7 - 15.7 g/dL Final   04/25/2011 15.3 11.7 - 15.7 g/dL Final     No imaging studies have been ordered    Tennille Cordova MD

## 2023-03-06 NOTE — PLAN OF CARE
Patient taking Tylenol and Ibuprofen for pain with adequate pain relief. Declines Oxycodone at this time. Patient ambulating independently, voiding without difficulty, IV removed. Patient breastfeeding infant well. Encouraged patient to call with needs/questions. Call light within reach, will continue to monitor.

## 2023-03-06 NOTE — PLAN OF CARE
Patient arrived to room 433 at 1015 via cart with baby in arms and  present. Received report from Sun SELF. Oriented to room and postpartum. Daniels patent draining adrián pink urine, urine clearing by evening. Patient up to the bathroom at 1600, denies dizziness. Instructed patient on yenny cares. Assisting with cares as needed.

## 2023-03-06 NOTE — LACTATION NOTE
Routine Lactation visit with Allyson, significant other Alfredo & baby frank Nieves. Allyson reports feeding is going well. She shared her plan long-term is to mostly pump and bottle feed her expressed breastmilk, but initially she's latching baby and feeding at breast to ensure he gets the colostrum. Occasionally offering bottled formula as desired. Allyson shared baby latches well when he's awake, but has been a bit sleepier with latching today. At time of visit, baby at right breast in cradle hold, but swaddled; suggested unswaddling baby and trying to feed again. Allyson would like to shower, since baby is sleepy, and then states she'll try to feed him again. Also discussed we'd recommend pumping if he doesn't eat well every 2-3 hours, since he's over 24 hours, mostly for stimulation for Allyson. Offered hospital grade pump to be set up if baby doesn't feed well. At this point, she doesn't want to pump, but will let nurses know if she wants help or to pump.    Reviewed milk supply and engorgement. Encouraged to review Breastfeeding section in Your Guide to Postpartum [facility]. Discussed typical  feeding patterns, cluster feeding, and ways to wake a sleepy baby for feedings.    Feeding plan: Recommend unlimited, frequent breast feedings: At least 8 - 12 times every 24 hours. If wanting to pump/bottle feed, recommend pumping every 2-3 hours, at least 8 times per day. Encouraged use of feeding log and to record feedings, and void/stool patterns. Allyson has a pump for home use.  Encouraged to call with needs, will revisit as needed. Allyson & Alfredo appreciative of visit.    Radha Contreras, RN-C, IBCLC, MNN, PHN, BSN

## 2023-03-06 NOTE — PLAN OF CARE
Vital signs stable. Postpartum assessment WDL. PIV R forearm SL. Lab draw for Hgb this AM. Incision with dressing CDI. Pain controlled with Tylenol, Toradol or Ibuprofen, and ice to incision. Patient ambulated to bathroom x 2 with SBA and tolerated well. Daniels removed at 0023. Large unmeasured void at 0430. Patient reports passing gas. Breastfeeding on cue with some assist. Patient and infant bonding well. Will continue with current plan of care.

## 2023-03-07 PROBLEM — Z98.891 S/P CESAREAN SECTION: Status: ACTIVE | Noted: 2023-03-07

## 2023-03-07 PROBLEM — D62 ANEMIA DUE TO BLOOD LOSS, ACUTE: Status: ACTIVE | Noted: 2023-03-07

## 2023-03-07 PROCEDURE — 120N000012 HC R&B POSTPARTUM

## 2023-03-07 PROCEDURE — 250N000013 HC RX MED GY IP 250 OP 250 PS 637

## 2023-03-07 PROCEDURE — 250N000013 HC RX MED GY IP 250 OP 250 PS 637: Performed by: SPECIALIST

## 2023-03-07 PROCEDURE — 250N000013 HC RX MED GY IP 250 OP 250 PS 637: Performed by: OBSTETRICS & GYNECOLOGY

## 2023-03-07 RX ADMIN — IBUPROFEN 800 MG: 400 TABLET ORAL at 18:58

## 2023-03-07 RX ADMIN — ACETAMINOPHEN 975 MG: 325 TABLET ORAL at 11:15

## 2023-03-07 RX ADMIN — ACETAMINOPHEN 975 MG: 325 TABLET ORAL at 22:57

## 2023-03-07 RX ADMIN — ACETAMINOPHEN 975 MG: 325 TABLET ORAL at 04:34

## 2023-03-07 RX ADMIN — OXYCODONE HYDROCHLORIDE 5 MG: 5 TABLET ORAL at 22:58

## 2023-03-07 RX ADMIN — IBUPROFEN 800 MG: 400 TABLET ORAL at 07:16

## 2023-03-07 RX ADMIN — SENNOSIDES AND DOCUSATE SODIUM 1 TABLET: 50; 8.6 TABLET ORAL at 08:38

## 2023-03-07 RX ADMIN — OXYCODONE HYDROCHLORIDE 5 MG: 5 TABLET ORAL at 13:01

## 2023-03-07 RX ADMIN — FERROUS SULFATE TAB 325 MG (65 MG ELEMENTAL FE) 325 MG: 325 (65 FE) TAB at 08:38

## 2023-03-07 RX ADMIN — OXYCODONE HYDROCHLORIDE 5 MG: 5 TABLET ORAL at 17:03

## 2023-03-07 RX ADMIN — ACETAMINOPHEN 975 MG: 325 TABLET ORAL at 17:03

## 2023-03-07 RX ADMIN — OXYCODONE HYDROCHLORIDE 5 MG: 5 TABLET ORAL at 08:38

## 2023-03-07 RX ADMIN — SENNOSIDES AND DOCUSATE SODIUM 1 TABLET: 50; 8.6 TABLET ORAL at 20:33

## 2023-03-07 RX ADMIN — IBUPROFEN 800 MG: 400 TABLET ORAL at 13:01

## 2023-03-07 ASSESSMENT — ACTIVITIES OF DAILY LIVING (ADL)
ADLS_ACUITY_SCORE: 18

## 2023-03-07 NOTE — PLAN OF CARE
VSS. Incision drsg removed & CDI with staples.Incisional pain well controlled with Tylenol,Ibuprofen & Oxycodone., Also using abdominal binder.Gurpreet reg diet. Voiding.Manteno breast feeds going well with help to latch & position. Also plans to pump/bottle ebm /formula.

## 2023-03-07 NOTE — PLAN OF CARE
Pt in stable condition and vitals stable. Attended to infant throughout the night and . S/O helped. Report to oncoming Rn and care transfered

## 2023-03-07 NOTE — PROGRESS NOTES
"[facility]   Obstetrics Post-Op / Progress Note         Assessment and Plan:    Assessment:   Post-operative day #2  Low transverse primary  section  L&D complications: Intrauterine pregnancy at 40+3 weeks gestation  Prolonged induction  Failed vacuum  Anemia      Clean wound without signs of infection.  No immediate surgical complications identified.  Pain well-controlled.      Plan:   Ambulation encouraged  Pain control measures as needed  Reportable signs and symptoms dicussed with the patient            Interval History:   Doing well.  Pain is controlled.  No fevers.  No history of wound drainage, warmth or significant erythema.  Good appetite.  Denies chest pain, shortness of breath, nausea or vomiting.  Breastfeeding well.          Significant Problems:      Patient Active Problem List   Diagnosis     CARDIOVASCULAR SCREENING; LDL GOAL LESS THAN 160     Insomnia     Migraine headache     Atypical squamous cells of undetermined significance (ASCUS) on Papanicolaou smear of cervix     Ganglion cyst of wrist, left     Weakness of both hips     Labor and delivery indication for care or intervention             Review of Systems:    The patient denies any chest pain, shortness of breath, excessive pain, fever, chills, purulent drainage from the wound, nausea or vomiting.          Medications:   All medications related to the patient's surgery have been reviewed          Physical Exam:   All vitals stable  Blood pressure 118/66, pulse 76, temperature 98.3  F (36.8  C), temperature source Oral, resp. rate 17, height 1.626 m (5' 4\"), weight 74.4 kg (164 lb), SpO2 100 %, unknown if currently breastfeeding.  Wound clean and dry with minimal or no drainage.  Surrounding skin with minimal erythema.          Data:   All laboratory data related to this surgery reviewed  Lab Results   Component Value Date    HGB 8.1 (L) 2023       DAMIEN CISNEROS MD  "

## 2023-03-07 NOTE — PLAN OF CARE
VSS, breastfeeding, fundus is firm at U/1, scant flow, no clots.  Incision is approximated, staples intact, no drainage.  Pain controlled with Tylenol, Ibuprofen and Oxy, also wearing an abdominal binder for additional support.  Independent with cares and ambulating frequently.   is at bedside and supportive.  Will continue to monitor and support.

## 2023-03-08 VITALS
RESPIRATION RATE: 16 BRPM | SYSTOLIC BLOOD PRESSURE: 126 MMHG | OXYGEN SATURATION: 100 % | TEMPERATURE: 98.2 F | DIASTOLIC BLOOD PRESSURE: 76 MMHG | HEART RATE: 80 BPM | HEIGHT: 64 IN | WEIGHT: 164 LBS | BODY MASS INDEX: 28 KG/M2

## 2023-03-08 PROCEDURE — 250N000013 HC RX MED GY IP 250 OP 250 PS 637: Performed by: OBSTETRICS & GYNECOLOGY

## 2023-03-08 PROCEDURE — 250N000013 HC RX MED GY IP 250 OP 250 PS 637

## 2023-03-08 PROCEDURE — 250N000013 HC RX MED GY IP 250 OP 250 PS 637: Performed by: SPECIALIST

## 2023-03-08 RX ORDER — OXYCODONE HYDROCHLORIDE 5 MG/1
5 TABLET ORAL EVERY 4 HOURS PRN
Qty: 15 TABLET | Refills: 0 | Status: SHIPPED | OUTPATIENT
Start: 2023-03-08 | End: 2023-03-29

## 2023-03-08 RX ORDER — IBUPROFEN 800 MG/1
800 TABLET, FILM COATED ORAL EVERY 6 HOURS
Qty: 40 TABLET | Refills: 0 | Status: ON HOLD | OUTPATIENT
Start: 2023-03-08 | End: 2023-04-05

## 2023-03-08 RX ORDER — ACETAMINOPHEN 325 MG/1
975 TABLET ORAL EVERY 6 HOURS
Qty: 40 TABLET | Refills: 0 | Status: ON HOLD | OUTPATIENT
Start: 2023-03-08 | End: 2023-04-05

## 2023-03-08 RX ORDER — FERROUS SULFATE 325(65) MG
325 TABLET ORAL 2 TIMES DAILY
Qty: 50 TABLET | Refills: 0 | Status: SHIPPED | OUTPATIENT
Start: 2023-03-08 | End: 2023-05-14

## 2023-03-08 RX ADMIN — OXYCODONE HYDROCHLORIDE 5 MG: 5 TABLET ORAL at 04:01

## 2023-03-08 RX ADMIN — SENNOSIDES AND DOCUSATE SODIUM 2 TABLET: 50; 8.6 TABLET ORAL at 08:01

## 2023-03-08 RX ADMIN — FERROUS SULFATE TAB 325 MG (65 MG ELEMENTAL FE) 325 MG: 325 (65 FE) TAB at 08:02

## 2023-03-08 RX ADMIN — ACETAMINOPHEN 975 MG: 325 TABLET ORAL at 07:01

## 2023-03-08 RX ADMIN — OXYCODONE HYDROCHLORIDE 5 MG: 5 TABLET ORAL at 11:34

## 2023-03-08 RX ADMIN — IBUPROFEN 800 MG: 400 TABLET ORAL at 07:01

## 2023-03-08 RX ADMIN — OXYCODONE HYDROCHLORIDE 5 MG: 5 TABLET ORAL at 08:02

## 2023-03-08 RX ADMIN — IBUPROFEN 800 MG: 400 TABLET ORAL at 01:03

## 2023-03-08 ASSESSMENT — ACTIVITIES OF DAILY LIVING (ADL)
ADLS_ACUITY_SCORE: 18

## 2023-03-08 NOTE — DISCHARGE SUMMARY
[facility] Discharge Summary    Aicha Draper MRN# [id number]   Age: 37 year old YOB: 1985     Date of Admission:  3/3/2023  Date of Discharge::  3/8/2023  Admitting Physician:  Guerda Hamm MD  Discharge Physician:  DAMIEN CISNEROS MD     Home clinic: [facility]          Admission Diagnoses:   Labor and delivery indication for care or intervention [O75.9]          Discharge Diagnosis:   Intrauterine pregnancy at 40+3 weeks gestation  Failed vacuum extraction  S/P  section  Acute blood loss anemia          Procedures:   Procedure(s): Primary low transverse  section       No other procedures performed during this admission           Medications Prior to Admission:     Medications Prior to Admission   Medication Sig Dispense Refill Last Dose     calcium carbonate (TUMS) 500 MG chewable tablet Take 2 chew tab by mouth 4 times daily as needed for heartburn        levothyroxine (SYNTHROID/LEVOTHROID) 50 MCG tablet Take 50 mcg by mouth daily        Multiple Vitamin (DAILY MULTIVITAMIN PO) Take  by mouth daily.                Discharge Medications:     Current Discharge Medication List      START taking these medications    Details   acetaminophen (TYLENOL) 325 MG tablet Take 3 tablets (975 mg) by mouth every 6 hours  Qty: 40 tablet, Refills: 0    Associated Diagnoses: S/P  section      ferrous sulfate (FEROSUL) 325 (65 Fe) MG tablet Take 1 tablet (325 mg) by mouth 2 times daily  Qty: 50 tablet, Refills: 0    Associated Diagnoses: Anemia due to blood loss, acute      ibuprofen (ADVIL/MOTRIN) 800 MG tablet Take 1 tablet (800 mg) by mouth every 6 hours  Qty: 40 tablet, Refills: 0    Associated Diagnoses: S/P  section      oxyCODONE (ROXICODONE) 5 MG tablet Take 1 tablet (5 mg) by mouth every 4 hours as needed for pain  Qty: 15 tablet, Refills: 0    Associated Diagnoses: S/P  section         CONTINUE these medications which have NOT CHANGED    Details    calcium carbonate (TUMS) 500 MG chewable tablet Take 2 chew tab by mouth 4 times daily as needed for heartburn      levothyroxine (SYNTHROID/LEVOTHROID) 50 MCG tablet Take 50 mcg by mouth daily      Multiple Vitamin (DAILY MULTIVITAMIN PO) Take  by mouth daily.                   Consultations:   No consultations were requested during this admission          Brief History of Labor or Admission:   Presented for induction. Underwent induction, progressed through labor and got to complete. Attempt made at vacuum extraction which was unsuccessful. Patient underwent  section without complications.           Hospital Course:   The patient's hospital course was unremarkable except for anemia.  She recovered as anticipated and experienced no post-operative complications.  On discharge, her pain was well controlled. Vaginal bleeding is similar to peak menstrual flow.  Voiding without difficulty.  Ambulating well and tolerating a normal diet.  No fever or significant wound drainage.  Breastfeeding well.  Infant is stable.  She was discharged on post-partum day #3.    Post-partum hemoglobin:   Hemoglobin   Date Value Ref Range Status   2023 8.1 (L) 11.7 - 15.7 g/dL Final   2020 14.8 11.7 - 15.7 g/dL Final             Discharge Instructions and Follow-Up:   Discharge diet: Regular   Discharge activity: No heavy lifting for 6 week(s)  No driving for 2 week(s)   Discharge follow-up: Follow up with [facility] in 2 and 6 weeks   Wound care: Drink plenty of fluids  Steri-strips off in 7 days           Discharge Disposition:   Discharged to home      Attestation:  I have reviewed today's vital signs, notes, medications, labs and imaging.    DAMIEN CISNEROS MD

## 2023-03-08 NOTE — LACTATION NOTE
"Discharge visit with Allyson.    Allyson's plan is to eventually exclusively pump and bottle so she doesn't \"need to be tied to her baby not being able to go anywhere\". She is bringing infant to breast now, but plans to pump more once she's home.    With the combination of pump/bottle and breastfeeding, encouraged Allyson to pump any time infant has a bottle to match supply and demand. If infant is breastfeeding well, there is no need to pump with that feeding, but pump anytime infant has bottle.     Once infant is exclusively bottle feeding, Allyson will have more flexibility with her pumping schedule.    Allyson has a breast pump for home use.    Dayanna Cochran RN IBCLC  "

## 2023-03-08 NOTE — PLAN OF CARE
Patient taking Tylenol, Ibuprofen, and Oxycodone for pain with adequate pain relief. Patient ambulating independently, voiding without difficulty. Patient ready for discharge per order, all education complete/questions answered. Encouraged patient to  call with needs/questions. Call light within reach, will continue to monitor until discharge.

## 2023-03-29 ENCOUNTER — HOSPITAL ENCOUNTER (INPATIENT)
Facility: CLINIC | Age: 38
LOS: 7 days | Discharge: HOME HEALTH | End: 2023-04-05
Attending: EMERGENCY MEDICINE | Admitting: STUDENT IN AN ORGANIZED HEALTH CARE EDUCATION/TRAINING PROGRAM
Payer: COMMERCIAL

## 2023-03-29 ENCOUNTER — APPOINTMENT (OUTPATIENT)
Dept: CT IMAGING | Facility: CLINIC | Age: 38
End: 2023-03-29
Attending: EMERGENCY MEDICINE
Payer: COMMERCIAL

## 2023-03-29 ENCOUNTER — APPOINTMENT (OUTPATIENT)
Dept: ULTRASOUND IMAGING | Facility: CLINIC | Age: 38
End: 2023-03-29
Attending: EMERGENCY MEDICINE
Payer: COMMERCIAL

## 2023-03-29 ENCOUNTER — LAB (OUTPATIENT)
Dept: LAB | Facility: CLINIC | Age: 38
End: 2023-03-29
Payer: COMMERCIAL

## 2023-03-29 DIAGNOSIS — I50.9 ACUTE CONGESTIVE HEART FAILURE, UNSPECIFIED HEART FAILURE TYPE (H): ICD-10-CM

## 2023-03-29 DIAGNOSIS — Z98.891 S/P CESAREAN SECTION: ICD-10-CM

## 2023-03-29 DIAGNOSIS — L08.9 WOUND INFECTION: ICD-10-CM

## 2023-03-29 DIAGNOSIS — T14.8XXA WOUND INFECTION: ICD-10-CM

## 2023-03-29 DIAGNOSIS — E87.6 HYPOKALEMIA: ICD-10-CM

## 2023-03-29 DIAGNOSIS — Z98.891 S/P C-SECTION: ICD-10-CM

## 2023-03-29 DIAGNOSIS — N73.9 PELVIC ABSCESS IN FEMALE: Primary | ICD-10-CM

## 2023-03-29 DIAGNOSIS — D64.9 ANEMIA: Primary | ICD-10-CM

## 2023-03-29 DIAGNOSIS — D64.9 ANEMIA, UNSPECIFIED TYPE: ICD-10-CM

## 2023-03-29 LAB
ABO/RH(D): NORMAL
ALBUMIN SERPL BCG-MCNC: 2.2 G/DL (ref 3.5–5.2)
ALBUMIN UR-MCNC: NEGATIVE MG/DL
ALP SERPL-CCNC: 120 U/L (ref 35–104)
ALT SERPL W P-5'-P-CCNC: 14 U/L (ref 10–35)
ANION GAP SERPL CALCULATED.3IONS-SCNC: 14 MMOL/L (ref 7–15)
ANTIBODY SCREEN: NEGATIVE
APPEARANCE UR: CLEAR
AST SERPL W P-5'-P-CCNC: 15 U/L (ref 10–35)
ATRIAL RATE - MUSE: 83 BPM
BASOPHILS # BLD AUTO: 0 10E3/UL (ref 0–0.2)
BASOPHILS NFR BLD AUTO: 0 %
BILIRUB DIRECT SERPL-MCNC: <0.2 MG/DL (ref 0–0.3)
BILIRUB SERPL-MCNC: 0.2 MG/DL
BILIRUB UR QL STRIP: NEGATIVE
BLD PROD TYP BPU: NORMAL
BLOOD COMPONENT TYPE: NORMAL
BUN SERPL-MCNC: 11.8 MG/DL (ref 6–20)
CALCIUM SERPL-MCNC: 8.4 MG/DL (ref 8.6–10)
CHLORIDE SERPL-SCNC: 106 MMOL/L (ref 98–107)
CODING SYSTEM: NORMAL
COLOR UR AUTO: NORMAL
CREAT SERPL-MCNC: 0.78 MG/DL (ref 0.51–0.95)
CROSSMATCH: NORMAL
CRP SERPL-MCNC: 297.22 MG/L
DEPRECATED HCO3 PLAS-SCNC: 22 MMOL/L (ref 22–29)
DIASTOLIC BLOOD PRESSURE - MUSE: NORMAL MMHG
EOSINOPHIL # BLD AUTO: 0.2 10E3/UL (ref 0–0.7)
EOSINOPHIL NFR BLD AUTO: 3 %
ERYTHROCYTE [DISTWIDTH] IN BLOOD BY AUTOMATED COUNT: 14.4 % (ref 10–15)
GFR SERPL CREATININE-BSD FRML MDRD: >90 ML/MIN/1.73M2
GLUCOSE SERPL-MCNC: 92 MG/DL (ref 70–99)
GLUCOSE UR STRIP-MCNC: NEGATIVE MG/DL
HCT VFR BLD AUTO: 21.2 % (ref 35–47)
HGB BLD-MCNC: 6.6 G/DL (ref 11.7–15.7)
HGB BLD-MCNC: 7.9 G/DL (ref 11.7–15.7)
HGB UR QL STRIP: NEGATIVE
IMM GRANULOCYTES # BLD: 0.3 10E3/UL
IMM GRANULOCYTES NFR BLD: 4 %
INTERPRETATION ECG - MUSE: NORMAL
IRON BINDING CAPACITY (ROCHE): 151 UG/DL (ref 240–430)
IRON SATN MFR SERPL: 8 % (ref 15–46)
IRON SERPL-MCNC: 12 UG/DL (ref 37–145)
ISSUE DATE AND TIME: NORMAL
KETONES UR STRIP-MCNC: NEGATIVE MG/DL
LEUKOCYTE ESTERASE UR QL STRIP: NEGATIVE
LYMPHOCYTES # BLD AUTO: 1.4 10E3/UL (ref 0.8–5.3)
LYMPHOCYTES NFR BLD AUTO: 20 %
MAGNESIUM SERPL-MCNC: 1.8 MG/DL (ref 1.7–2.3)
MCH RBC QN AUTO: 28.4 PG (ref 26.5–33)
MCHC RBC AUTO-ENTMCNC: 31.1 G/DL (ref 31.5–36.5)
MCV RBC AUTO: 91 FL (ref 78–100)
MONOCYTES # BLD AUTO: 0.8 10E3/UL (ref 0–1.3)
MONOCYTES NFR BLD AUTO: 11 %
NEUTROPHILS # BLD AUTO: 4.6 10E3/UL (ref 1.6–8.3)
NEUTROPHILS NFR BLD AUTO: 62 %
NITRATE UR QL: NEGATIVE
NRBC # BLD AUTO: 0 10E3/UL
NRBC BLD AUTO-RTO: 0 /100
NT-PROBNP SERPL-MCNC: 1102 PG/ML (ref 0–450)
P AXIS - MUSE: 59 DEGREES
PH UR STRIP: 6.5 [PH] (ref 5–7)
PLATELET # BLD AUTO: 571 10E3/UL (ref 150–450)
POTASSIUM SERPL-SCNC: 2.6 MMOL/L (ref 3.4–5.3)
PR INTERVAL - MUSE: 138 MS
PROT SERPL-MCNC: 5.3 G/DL (ref 6.4–8.3)
QRS DURATION - MUSE: 94 MS
QT - MUSE: 378 MS
QTC - MUSE: 444 MS
R AXIS - MUSE: 61 DEGREES
RBC # BLD AUTO: 2.32 10E6/UL (ref 3.8–5.2)
RETICS # AUTO: 0.01 10E6/UL (ref 0.03–0.1)
RETICS/RBC NFR AUTO: 0.5 % (ref 0.5–2)
SODIUM SERPL-SCNC: 142 MMOL/L (ref 136–145)
SP GR UR STRIP: 1.01 (ref 1–1.03)
SPECIMEN EXPIRATION DATE: NORMAL
SYSTOLIC BLOOD PRESSURE - MUSE: NORMAL MMHG
T AXIS - MUSE: 44 DEGREES
UNIT ABO/RH: NORMAL
UNIT NUMBER: NORMAL
UNIT STATUS: NORMAL
UNIT TYPE ISBT: 6200
UROBILINOGEN UR STRIP-MCNC: NORMAL MG/DL
VENTRICULAR RATE- MUSE: 83 BPM
WBC # BLD AUTO: 7.3 10E3/UL (ref 4–11)

## 2023-03-29 PROCEDURE — 93005 ELECTROCARDIOGRAM TRACING: CPT

## 2023-03-29 PROCEDURE — 36415 COLL VENOUS BLD VENIPUNCTURE: CPT | Performed by: STUDENT IN AN ORGANIZED HEALTH CARE EDUCATION/TRAINING PROGRAM

## 2023-03-29 PROCEDURE — 96365 THER/PROPH/DIAG IV INF INIT: CPT

## 2023-03-29 PROCEDURE — 96375 TX/PRO/DX INJ NEW DRUG ADDON: CPT

## 2023-03-29 PROCEDURE — 82248 BILIRUBIN DIRECT: CPT | Performed by: EMERGENCY MEDICINE

## 2023-03-29 PROCEDURE — 250N000009 HC RX 250: Performed by: EMERGENCY MEDICINE

## 2023-03-29 PROCEDURE — 86140 C-REACTIVE PROTEIN: CPT | Performed by: STUDENT IN AN ORGANIZED HEALTH CARE EDUCATION/TRAINING PROGRAM

## 2023-03-29 PROCEDURE — 86850 RBC ANTIBODY SCREEN: CPT | Performed by: EMERGENCY MEDICINE

## 2023-03-29 PROCEDURE — 74177 CT ABD & PELVIS W/CONTRAST: CPT

## 2023-03-29 PROCEDURE — 99292 CRITICAL CARE ADDL 30 MIN: CPT

## 2023-03-29 PROCEDURE — 36415 COLL VENOUS BLD VENIPUNCTURE: CPT | Performed by: EMERGENCY MEDICINE

## 2023-03-29 PROCEDURE — 250N000011 HC RX IP 250 OP 636: Performed by: EMERGENCY MEDICINE

## 2023-03-29 PROCEDURE — 250N000011 HC RX IP 250 OP 636: Performed by: STUDENT IN AN ORGANIZED HEALTH CARE EDUCATION/TRAINING PROGRAM

## 2023-03-29 PROCEDURE — 87075 CULTR BACTERIA EXCEPT BLOOD: CPT | Performed by: STUDENT IN AN ORGANIZED HEALTH CARE EDUCATION/TRAINING PROGRAM

## 2023-03-29 PROCEDURE — C9113 INJ PANTOPRAZOLE SODIUM, VIA: HCPCS | Performed by: EMERGENCY MEDICINE

## 2023-03-29 PROCEDURE — 83550 IRON BINDING TEST: CPT

## 2023-03-29 PROCEDURE — 250N000013 HC RX MED GY IP 250 OP 250 PS 637: Performed by: STUDENT IN AN ORGANIZED HEALTH CARE EDUCATION/TRAINING PROGRAM

## 2023-03-29 PROCEDURE — 85018 HEMOGLOBIN: CPT | Performed by: STUDENT IN AN ORGANIZED HEALTH CARE EDUCATION/TRAINING PROGRAM

## 2023-03-29 PROCEDURE — 81003 URINALYSIS AUTO W/O SCOPE: CPT | Performed by: EMERGENCY MEDICINE

## 2023-03-29 PROCEDURE — 87070 CULTURE OTHR SPECIMN AEROBIC: CPT | Performed by: STUDENT IN AN ORGANIZED HEALTH CARE EDUCATION/TRAINING PROGRAM

## 2023-03-29 PROCEDURE — 99223 1ST HOSP IP/OBS HIGH 75: CPT | Mod: AI | Performed by: STUDENT IN AN ORGANIZED HEALTH CARE EDUCATION/TRAINING PROGRAM

## 2023-03-29 PROCEDURE — 85045 AUTOMATED RETICULOCYTE COUNT: CPT | Performed by: STUDENT IN AN ORGANIZED HEALTH CARE EDUCATION/TRAINING PROGRAM

## 2023-03-29 PROCEDURE — 86923 COMPATIBILITY TEST ELECTRIC: CPT | Performed by: EMERGENCY MEDICINE

## 2023-03-29 PROCEDURE — 83735 ASSAY OF MAGNESIUM: CPT | Performed by: EMERGENCY MEDICINE

## 2023-03-29 PROCEDURE — 85025 COMPLETE CBC W/AUTO DIFF WBC: CPT | Performed by: EMERGENCY MEDICINE

## 2023-03-29 PROCEDURE — 93970 EXTREMITY STUDY: CPT

## 2023-03-29 PROCEDURE — 120N000001 HC R&B MED SURG/OB

## 2023-03-29 PROCEDURE — 80053 COMPREHEN METABOLIC PANEL: CPT | Performed by: EMERGENCY MEDICINE

## 2023-03-29 PROCEDURE — 250N000013 HC RX MED GY IP 250 OP 250 PS 637: Performed by: EMERGENCY MEDICINE

## 2023-03-29 PROCEDURE — P9016 RBC LEUKOCYTES REDUCED: HCPCS | Performed by: EMERGENCY MEDICINE

## 2023-03-29 PROCEDURE — 87040 BLOOD CULTURE FOR BACTERIA: CPT | Performed by: STUDENT IN AN ORGANIZED HEALTH CARE EDUCATION/TRAINING PROGRAM

## 2023-03-29 PROCEDURE — 99291 CRITICAL CARE FIRST HOUR: CPT | Mod: 25

## 2023-03-29 PROCEDURE — 83880 ASSAY OF NATRIURETIC PEPTIDE: CPT | Performed by: EMERGENCY MEDICINE

## 2023-03-29 RX ORDER — PIPERACILLIN SODIUM, TAZOBACTAM SODIUM 3; .375 G/15ML; G/15ML
3.38 INJECTION, POWDER, LYOPHILIZED, FOR SOLUTION INTRAVENOUS EVERY 6 HOURS
Status: DISCONTINUED | OUTPATIENT
Start: 2023-03-29 | End: 2023-03-29

## 2023-03-29 RX ORDER — POTASSIUM CHLORIDE 7.45 MG/ML
10 INJECTION INTRAVENOUS ONCE
Status: COMPLETED | OUTPATIENT
Start: 2023-03-29 | End: 2023-03-29

## 2023-03-29 RX ORDER — MULTIVIT WITH MINERALS/LUTEIN
1000 TABLET ORAL DAILY
COMMUNITY
End: 2023-05-14

## 2023-03-29 RX ORDER — PIPERACILLIN SODIUM, TAZOBACTAM SODIUM 3; .375 G/15ML; G/15ML
3.38 INJECTION, POWDER, LYOPHILIZED, FOR SOLUTION INTRAVENOUS EVERY 6 HOURS
Status: DISCONTINUED | OUTPATIENT
Start: 2023-03-30 | End: 2023-04-04

## 2023-03-29 RX ORDER — CHLORAL HYDRATE 500 MG
2 CAPSULE ORAL EVERY EVENING
COMMUNITY

## 2023-03-29 RX ORDER — PRENATAL VIT/IRON FUM/FOLIC AC 27MG-0.8MG
1 TABLET ORAL DAILY
COMMUNITY
End: 2023-05-26

## 2023-03-29 RX ORDER — FUROSEMIDE 10 MG/ML
40 INJECTION INTRAMUSCULAR; INTRAVENOUS ONCE
Status: COMPLETED | OUTPATIENT
Start: 2023-03-29 | End: 2023-03-29

## 2023-03-29 RX ORDER — ONDANSETRON 2 MG/ML
4 INJECTION INTRAMUSCULAR; INTRAVENOUS EVERY 6 HOURS PRN
Status: DISCONTINUED | OUTPATIENT
Start: 2023-03-29 | End: 2023-04-05 | Stop reason: HOSPADM

## 2023-03-29 RX ORDER — CHOLECALCIFEROL (VITAMIN D3) 50 MCG
1 TABLET ORAL DAILY
COMMUNITY
End: 2023-05-14

## 2023-03-29 RX ORDER — OXYCODONE HYDROCHLORIDE 5 MG/1
5 TABLET ORAL EVERY 4 HOURS PRN
Status: DISCONTINUED | OUTPATIENT
Start: 2023-03-29 | End: 2023-04-05 | Stop reason: HOSPADM

## 2023-03-29 RX ORDER — FUROSEMIDE 20 MG
1 TABLET ORAL
Status: ON HOLD | COMMUNITY
Start: 2023-03-27 | End: 2023-04-05

## 2023-03-29 RX ORDER — ACETAMINOPHEN 650 MG/1
650 SUPPOSITORY RECTAL EVERY 6 HOURS PRN
Status: DISCONTINUED | OUTPATIENT
Start: 2023-03-29 | End: 2023-04-05 | Stop reason: HOSPADM

## 2023-03-29 RX ORDER — IOPAMIDOL 755 MG/ML
82 INJECTION, SOLUTION INTRAVASCULAR ONCE
Status: COMPLETED | OUTPATIENT
Start: 2023-03-29 | End: 2023-03-29

## 2023-03-29 RX ORDER — CEPHALEXIN 500 MG/1
500 CAPSULE ORAL 3 TIMES DAILY
Status: ON HOLD | COMMUNITY
Start: 2023-03-27 | End: 2023-04-05

## 2023-03-29 RX ORDER — ACETAMINOPHEN 325 MG/1
650 TABLET ORAL EVERY 6 HOURS PRN
Status: DISCONTINUED | OUTPATIENT
Start: 2023-03-29 | End: 2023-04-05 | Stop reason: HOSPADM

## 2023-03-29 RX ORDER — MAGNESIUM SULFATE HEPTAHYDRATE 40 MG/ML
2 INJECTION, SOLUTION INTRAVENOUS ONCE
Status: COMPLETED | OUTPATIENT
Start: 2023-03-29 | End: 2023-03-29

## 2023-03-29 RX ORDER — LIDOCAINE 40 MG/G
CREAM TOPICAL
Status: DISCONTINUED | OUTPATIENT
Start: 2023-03-29 | End: 2023-04-05 | Stop reason: HOSPADM

## 2023-03-29 RX ORDER — POTASSIUM CHLORIDE 1.5 G/1.58G
20 POWDER, FOR SOLUTION ORAL ONCE
Status: COMPLETED | OUTPATIENT
Start: 2023-03-29 | End: 2023-03-29

## 2023-03-29 RX ORDER — ONDANSETRON 4 MG/1
4 TABLET, ORALLY DISINTEGRATING ORAL EVERY 6 HOURS PRN
Status: DISCONTINUED | OUTPATIENT
Start: 2023-03-29 | End: 2023-04-05 | Stop reason: HOSPADM

## 2023-03-29 RX ORDER — CEFAZOLIN SODIUM 2 G/100ML
2 INJECTION, SOLUTION INTRAVENOUS ONCE
Status: COMPLETED | OUTPATIENT
Start: 2023-03-29 | End: 2023-03-29

## 2023-03-29 RX ADMIN — PIPERACILLIN AND TAZOBACTAM 3.38 G: 3; .375 INJECTION, POWDER, FOR SOLUTION INTRAVENOUS at 21:12

## 2023-03-29 RX ADMIN — POTASSIUM CHLORIDE 20 MEQ: 1.5 POWDER, FOR SOLUTION ORAL at 14:21

## 2023-03-29 RX ADMIN — FUROSEMIDE 40 MG: 10 INJECTION, SOLUTION INTRAMUSCULAR; INTRAVENOUS at 17:18

## 2023-03-29 RX ADMIN — POTASSIUM CHLORIDE 10 MEQ: 7.46 INJECTION, SOLUTION INTRAVENOUS at 14:23

## 2023-03-29 RX ADMIN — CEFAZOLIN SODIUM 2 G: 2 INJECTION, SOLUTION INTRAVENOUS at 15:44

## 2023-03-29 RX ADMIN — PANTOPRAZOLE SODIUM 80 MG: 40 INJECTION, POWDER, FOR SOLUTION INTRAVENOUS at 14:22

## 2023-03-29 RX ADMIN — SODIUM CHLORIDE 89 ML: 9 INJECTION, SOLUTION INTRAVENOUS at 15:12

## 2023-03-29 RX ADMIN — ACETAMINOPHEN 650 MG: 325 TABLET, FILM COATED ORAL at 21:12

## 2023-03-29 RX ADMIN — MAGNESIUM SULFATE HEPTAHYDRATE 2 G: 40 INJECTION, SOLUTION INTRAVENOUS at 17:45

## 2023-03-29 RX ADMIN — IOPAMIDOL 82 ML: 755 INJECTION, SOLUTION INTRAVENOUS at 15:12

## 2023-03-29 ASSESSMENT — ENCOUNTER SYMPTOMS
FEVER: 0
COUGH: 0
LIGHT-HEADEDNESS: 0
SHORTNESS OF BREATH: 0
WOUND: 1
DIZZINESS: 0

## 2023-03-29 ASSESSMENT — ACTIVITIES OF DAILY LIVING (ADL)
ADLS_ACUITY_SCORE: 35

## 2023-03-29 NOTE — ED TRIAGE NOTES
three weeks ago. Now has swelling in legs and feet. Has drainage from incision, but OB is following that. Was seen by OB today and instructed to come to ER.

## 2023-03-29 NOTE — ED NOTES
DATE:  3/29/2023   TIME OF RECEIPT FROM LAB:  12:45 PM  LAB TEST:  potassium  LAB VALUE:  2.6  RESULTS GIVEN WITH READ-BACK TO (PROVIDER):  Kimberly Szymanski MD  TIME LAB VALUE REPORTED TO PROVIDER:   Kimberly Szymanski MD

## 2023-03-29 NOTE — ED NOTES
DATE:  3/29/2023   TIME OF RECEIPT FROM LAB:  12:23 PM  LAB TEST:  Hemoglobin  LAB VALUE:  6.6  RESULTS GIVEN WITH READ-BACK TO (PROVIDER):  Kimberly Szymanski MD  TIME LAB VALUE REPORTED TO PROVIDER:   12:24 PM

## 2023-03-29 NOTE — ED NOTES
"[facility]  ED Nurse Handoff Report    ED Chief complaint: Edema      ED Diagnosis:   Final diagnoses:   Anemia, unspecified type   Hypokalemia   Possible acute congestive heart failure, unspecified heart failure type (H)   Wound infection   S/P        Code Status: Full Code    Allergies:   Allergies   Allergen Reactions     No Known Drug Allergies        Patient Story: Per Dr. Davison's note, \"Aicha Draper is a 37 year old female with a history of PCOS and anemia who presents with leg swelling. The patient states that she was at her OB today and she was noted to have significant worsening of her lower extremity edema, and therefore she was sent to the ER. She states that on Monday her  wound started to open up and ooze.  Her wound was found to be infected and dehisced.  She was started on Keflex at that time.  She notes that for the past 3 weeks she also has developed worsening leg swelling. She also has had an episode or two of black and tarry stools a few days ago. She denies ongoing vaginal bleeding, shortness of breath, chest pain, fever, cough, light headedness, or dizziness. She has been breast feeding, however she indicates that she likely will discontinue that.\"  Focused Assessment:  A&O x 4, last pumped breasts at 0930 today, Purulent drainage from  incision, Purewick in place, IV x 2 L arm.     Treatments and/or interventions provided: 1 Unit Blood, See MAR  CT Chest (PE) Abdomen Pelvis w Contrast   Final Result   IMPRESSION:   1.  No evidence of pulmonary embolus.   2.  Loculated fluid collections in the pelvic cul-de-sac and the lower   abdomen may represent abscesses.   3.  Mild right hydronephrosis of uncertain etiology, possibly   secondary to the pelvic fluid collection.   4.  Small bilateral pleural effusions.      SILVIA NAJERA MD            SYSTEM ID:  [id number]       Lower Extremity Venous Duplex Bilateral   Final Result   IMPRESSION: No " evidence of deep venous thrombosis.      NINA TOM MD            SYSTEM ID:  [id number]      Echocardiogram Complete    (Results Pending)     Labs Ordered and Resulted from Time of ED Arrival to Time of ED Departure   BASIC METABOLIC PANEL - Abnormal       Result Value    Sodium 142      Potassium 2.6 (*)     Chloride 106      Carbon Dioxide (CO2) 22      Anion Gap 14      Urea Nitrogen 11.8      Creatinine 0.78      Calcium 8.4 (*)     Glucose 92      GFR Estimate >90     CBC WITH PLATELETS AND DIFFERENTIAL - Abnormal    WBC Count 7.3      RBC Count 2.32 (*)     Hemoglobin 6.6 (*)     Hematocrit 21.2 (*)     MCV 91      MCH 28.4      MCHC 31.1 (*)     RDW 14.4      Platelet Count 571 (*)     % Neutrophils 62      % Lymphocytes 20      % Monocytes 11      % Eosinophils 3      % Basophils 0      % Immature Granulocytes 4      NRBCs per 100 WBC 0      Absolute Neutrophils 4.6      Absolute Lymphocytes 1.4      Absolute Monocytes 0.8      Absolute Eosinophils 0.2      Absolute Basophils 0.0      Absolute Immature Granulocytes 0.3      Absolute NRBCs 0.0     HEPATIC FUNCTION PANEL - Abnormal    Protein Total 5.3 (*)     Albumin 2.2 (*)     Bilirubin Total 0.2      Alkaline Phosphatase 120 (*)     AST 15      ALT 14      Bilirubin Direct <0.20     NT PROBNP INPATIENT - Abnormal    N terminal Pro BNP Inpatient 1,102 (*)    RETICULOCYTE COUNT - Abnormal    % Reticulocyte 0.5      Absolute Reticulocyte 0.012 (*)    CRP INFLAMMATION - Abnormal    CRP Inflammation 297.22 (*)    MAGNESIUM - Normal    Magnesium 1.8     URINE MACROSCOPIC WITH REFLEX TO MICRO - Normal    Color Urine Light Yellow      Appearance Urine Clear      Glucose Urine Negative      Bilirubin Urine Negative      Ketones Urine Negative      Specific Gravity Urine 1.015      Blood Urine Negative      pH Urine 6.5      Protein Albumin Urine Negative      Urobilinogen Urine Normal      Nitrite Urine Negative      Leukocyte Esterase Urine Negative      OCCULT BLOOD STOOL   TYPE AND SCREEN, ADULT    ABO/RH(D) A POS      Antibody Screen Negative      SPECIMEN EXPIRATION DATE [id number]     PREPARE RED BLOOD CELLS (UNIT)    Blood Component Type Red Blood Cells      Product Code [id number]      Unit Status Transfused      Unit Number [id number]      CROSSMATCH Compatible      CODING SYSTEM [id number]      ISSUE DATE AND TIME [id number]      UNIT ABO/RH A+      UNIT TYPE ISBT 6200     PREPARE RED BLOOD CELLS (UNIT)   BLOOD CULTURE   BLOOD CULTURE   AEROBIC BACTERIAL CULTURE ROUTINE   ANAEROBIC BACTERIAL CULTURE ROUTINE   TRANSFUSE RED BLOOD CELLS (UNIT)   ABO/RH TYPE AND SCREEN       Patient's response to treatments and/or interventions: tolerating well, VS WNL    To be done/followed up on inpatient unit:  Antibiotics, Cardiac monitor, Cardiac Echo, Labs    Does this patient have any cognitive concerns?: none    Activity level - Baseline/Home:  Independent  Activity Level - Current:   Stand with Assist    Patient's Preferred language: English   Needed?: No    Isolation: None  Infection: Other  Patient tested for COVID 19 prior to admission: NO  Bariatric?: No    Vital Signs:   Vitals:    03/29/23 1659 03/29/23 1718 03/29/23 1733 03/29/23 1742   BP: 136/77 (!) 140/79 (!) 146/83 (!) 140/81   BP Location:       Patient Position:       Cuff Size:       Pulse: 85 83 84 83   Resp: 21 15 13 21   Temp:       TempSrc:       SpO2: 98% 98% 99% 99%   Weight:           Cardiac Rhythm:     Was the PSS-3 completed:   Yes  What interventions are required if any?               Family Comments:  updated  For the majority of the shift this patient's behavior was Green.   Behavioral interventions performed were n/a.    ED NURSE PHONE NUMBER: [phone]

## 2023-03-29 NOTE — ED PROVIDER NOTES
History     Chief Complaint:  Edema       HPI   Aicha Draper is a 37 year old female with a history of PCOS and anemia who presents with leg swelling. The patient states that she was at her OB today and she was noted to have significant worsening of her lower extremity edema, and therefore she was sent to the ER. She states that on Monday her  wound started to open up and ooze.  Her wound was found to be infected and dehisced.  She was started on Keflex at that time.  She notes that for the past 3 weeks she also has developed worsening leg swelling. She also has had an episode or two of black and tarry stools a few days ago. She denies ongoing vaginal bleeding, shortness of breath, chest pain, fever, cough, light headedness, or dizziness. She has been breast feeding, however she indicates that she likely will discontinue that.      Independent Historian:   None - Patient Only    Review of External Notes: Reviewed discharge summary from 3/8/23 where she had a acute stat c section.    ROS:  Review of Systems   Constitutional: Negative for fever.   Respiratory: Negative for cough and shortness of breath.    Cardiovascular: Positive for leg swelling. Negative for chest pain.   Genitourinary: Negative for vaginal bleeding.   Skin: Positive for wound.   Neurological: Negative for dizziness and light-headedness.   All other systems reviewed and are negative.      Allergies:  No Known Drug Allergies     Medications:    ferrous sulfate     Past Medical History:    Atypical squamous cells of undetermined significance ASCUS  PCOS  Migraine  Anemia due to blood loss    Past Surgical History:    C section  Cholecystectomy  Remove hairball from stomach  Appendectomy     Family History:    Father-Arthritis  Mother-HTN, hyperlipidmeia    Social History:  The patient presents to the ED with her .    Physical Exam     Patient Vitals for the past 24 hrs:   BP Temp Temp src Pulse Resp SpO2 Weight   23 1430  138/74 -- -- 84 18 99 % --   23 1406 131/77 -- -- 86 30 100 % --   23 1156 135/56 98.8  F (37.1  C) Temporal 95 24 100 % 74.4 kg (164 lb)        Physical Exam  Nursing note and vitals reviewed.  Constitutional:  Oriented to person, place, and time. Cooperative.   HENT:   Nose:    Nose normal.   Mouth/Throat:   Mucous membranes are normal.   Eyes:    Conjunctivae normal and EOM are normal.      Pupils are equal, round, and reactive to light.   Neck:    Trachea normal.   Cardiovascular:  Normal rate, regular rhythm, normal heart sounds and normal pulses. No murmur heard.  Pulmonary/Chest:  Slightly tachypneic with some possible faint rales in the bases.   Abdominal:   Soft. Normal appearance and bowel sounds are normal.      There is some mild tenderness to palpation to the lower abdomen.      There is no rebound and no CVA tenderness.   Musculoskeletal:  Significant bilateral lower extremity pitting edema present.  Extremities atraumatic x 4.   Lymphadenopathy:  No cervical adenopathy.   Neurological:   Alert and oriented to person, place, and time. Normal strength.      No cranial nerve deficit or sensory deficit. GCS eye subscore is 4. GCS verbal subscore is 5. GCS motor subscore is 6.   Skin:     wound has dehisced with purulent drainage present.  No rash noted.   Psychiatric:   Normal mood and affect.    Emergency Department Course     ECG:  ECG results from 23   EKG 12-lead, tracing only     Value    Systolic Blood Pressure     Diastolic Blood Pressure     Ventricular Rate 83    Atrial Rate 83    CT Interval 138    QRS Duration 94        QTc 444    P Axis 59    R AXIS 61    T Axis 44    Interpretation ECG      Sinus rhythm  Normal ECG       Imaging:  US Lower Extremity Venous Duplex Bilateral   Final Result   IMPRESSION: No evidence of deep venous thrombosis.      NINA TOM MD            SYSTEM ID:  [id number]      CT Chest (PE) Abdomen Pelvis w Contrast    (Results Pending)       Report per radiology    Laboratory:  Labs Ordered and Resulted from Time of ED Arrival to Time of ED Departure   BASIC METABOLIC PANEL - Abnormal       Result Value    Sodium 142      Potassium 2.6 (*)     Chloride 106      Carbon Dioxide (CO2) 22      Anion Gap 14      Urea Nitrogen 11.8      Creatinine 0.78      Calcium 8.4 (*)     Glucose 92      GFR Estimate >90     CBC WITH PLATELETS AND DIFFERENTIAL - Abnormal    WBC Count 7.3      RBC Count 2.32 (*)     Hemoglobin 6.6 (*)     Hematocrit 21.2 (*)     MCV 91      MCH 28.4      MCHC 31.1 (*)     RDW 14.4      Platelet Count 571 (*)     % Neutrophils 62      % Lymphocytes 20      % Monocytes 11      % Eosinophils 3      % Basophils 0      % Immature Granulocytes 4      NRBCs per 100 WBC 0      Absolute Neutrophils 4.6      Absolute Lymphocytes 1.4      Absolute Monocytes 0.8      Absolute Eosinophils 0.2      Absolute Basophils 0.0      Absolute Immature Granulocytes 0.3      Absolute NRBCs 0.0     HEPATIC FUNCTION PANEL - Abnormal    Protein Total 5.3 (*)     Albumin 2.2 (*)     Bilirubin Total 0.2      Alkaline Phosphatase 120 (*)     AST 15      ALT 14      Bilirubin Direct <0.20     NT PROBNP INPATIENT - Abnormal    N terminal Pro BNP Inpatient 1,102 (*)    MAGNESIUM - Normal    Magnesium 1.8     URINE MACROSCOPIC WITH REFLEX TO MICRO   TYPE AND SCREEN, ADULT    ABO/RH(D) A POS      Antibody Screen Negative      SPECIMEN EXPIRATION DATE [id number]     PREPARE RED BLOOD CELLS (UNIT)    Blood Component Type Red Blood Cells      Product Code [id number]      Unit Status Ready for issue      Unit Number [id number]      CROSSMATCH Compatible      CODING SYSTEM [id number]     PREPARE RED BLOOD CELLS (UNIT)   ABO/RH TYPE AND SCREEN        Emergency Department Course & Assessments:    Interventions:  Medications   ceFAZolin (ANCEF) 2 g in 100 mL D5W intermittent infusion (2 g Intravenous $New Bag 3/29/23 5356)   magnesium sulfate 2 g in 50 mL sterile water  intermittent infusion (has no administration in time range)   furosemide (LASIX) injection 40 mg (has no administration in time range)   pantoprazole (PROTONIX) IV push injection 80 mg (80 mg Intravenous $Given 3/29/23 1422)   potassium chloride 10 mEq in 100 mL sterile water infusion (0 mEq Intravenous Paused 3/29/23 1441)   potassium chloride (KLOR-CON) Packet 20 mEq (20 mEq Oral $Given 3/29/23 1421)   iopamidol (ISOVUE-370) solution 82 mL (82 mLs Intravenous $Given 3/29/23 1512)   Saline (89 mLs Intravenous $Given 3/29/23 1512)        Assessments:  1355 Obtained the patients history and performed initial exam    Independent Interpretation (X-rays, CTs, rhythm strip):  None    Consultations/Discussion of Management or Tests:  ED Course as of 03/29/23 1450   Wed Mar 29, 2023   1409 I spoke to Dr Hamm from OBGYN about the patients findings and next steps   1450 Talked to Dr. Briggs about the patients findings and possible admittance         Social Determinants of Health affecting care:   None    Disposition:  The patient was admitted to the hospital under the care of Dr. Briggs.     Impression & Plan      Medical Decision Making:  This is a 37-year-old female who was instructed to present to the ER from her OB/GYN due to worsening lower extremity edema.  She has a significant drop in her hemoglobin, and she also is quite hypokalemic.  I was concerned that she could have a postpartum cardiomyopathy as a cause of her edema, as she also is slightly tachypneic.  Her BNP also came back elevated.  Additionally, I was concerned for a possible deeper infection or bleeding internally.  This could be also from a GI bleed, possibly from an ulcer.  I did perform a rectal exam, however there was no stool in the vault to evaluate.  She is being provided IV Protonix in case she might have a GI bleed, and she is also being provided a unit of PRBCs and potassium replacement.  I spoke with her OB/GYN directly as well, Dr. Hamm,  who will follow along in the patient's care.  I then spoke with Dr. Briggs, who will be admitting her.  At this point her CT scan is still pending, but her ultrasound is negative for DVT.  She is also going to be provided IV Lasix, as her CT scan does appear to show pulmonary edema and pleural effusions.    Diagnosis:    ICD-10-CM    1. Anemia, unspecified type  D64.9       2. Hypokalemia  E87.6       3. Possible acute congestive heart failure, unspecified heart failure type (H)  I50.9       4. Wound infection  T14.8XXA     L08.9       5. S/P   Z98.891              Scribe Disclosure:  I, Wong Meyers, am serving as a scribe at 1:54 PM on 3/29/2023 to document services personally performed by Denzel Davison MD based on my observations and the provider's statements to me.     3/29/2023   Denzel Davison MD Lashkowitz, Seth H, MD  23 [phone]

## 2023-03-29 NOTE — H&P
[facility]    History and Physical - Hospitalist Service       Date of Admission:  3/29/2023    Assessment & Plan      Aicha Draper is a 37 year old female who is s/p C section on 3/5/2023 admitted on 3/29/2023 with surgical site infection, severe anemia, and bilateral lower extremity edema.      Pelvic abscesses   Surgical site infection   Pt is s/p C section on 3/5/2023. She saw her OB on 3/27 after she noted that her C section incision opened up and started to ooze purulent material. She was started on Keflex at this time. She was seen in follow-up today and had persistent drainage from her incision as well as significant bilateral lower extremity swelling (see below) so was sent to the ED for further evaluation.   *On examination, she has marked purulent drainage coming from her surgical incision. There does not appear to be surrounding cellulitis of the skin.  * CT of the abdomen showed loculated fluid collections in the pelvic cul-de-sac and lower abdomen which may represent abscess (fluid collection measures 8 x 7 cm).   * Pt was given a dose of Ancef prior to CT results   * The patient is hemodynamically stable and non-septic appearing   - Admit to inpatient   - Will broaden to zosyn given the intra-abdominal fluid collections  - Blood cultures ordered (collected after receiving Ancef)   - Will obtain cultures of the purulent drainage   - IR consultation for consideration of percutaneous drainage   - OB/Gyn Consultation   - Consider ID consult for antibiotic guidance     Bilateral lower extremity edema  Bilateral pleural effusions   Pt notes progressively  worsening bilateral lower extremity swelling since her delivery, but particularly in the past several days. CT scan of the chest shows small bilateral pleural effusions as well as mild ground glass opacity in the upper lungs which may represent atelectasis or edema. BNP is mildly elevated to 1,102. Bilateral lower extremity  ultrasound was negative for DVT.   * The patient denies shortness of breath and O2 saturations have been stable on room air.   * Could all be 2/2 low hgb and hypoalbuminemia, however LE edema and pleural effusions in the post partum state raises concern for post-partum cardiomyopathy   - Will obtain TTE to evaluation cardiac function   - Cardiac monitoring  - Will delay diuresis currently given that she is not hypoxic and she has severe hypokalemia, could consider diuresis after K+ is repleted      Acute blood loss anemia  Hemoglobin on presentation is 6.6. Pre-operatively her hemoglobin was 11.6. The day following her C section,  Her hemoglobin was 8.1. Her EBL was 1020ccs. There is no subsequent re-check of her hemoglobin until now. She denies any on-going vaginal bleeding. She did report a few darker colored stools, but no overt GI bleeding noted. Her iron levels are quite low. Her reticulocyte count is also quite low. Anemia could be 2/2 bone marrow suppression in the setting of low iron stores and concomitant infection.   * pt consented for blood transfusion in the ED and received 1 unit of PRBCs   - Monitor hemoglobin and transfuse for hgb <7.   - Will order hemoocult stool sample to rule out GI bleeding, if positive will need GI consult     Severe hypokalemia   Potassium is 2.6   - replace per protocol     Mild right hydronephrosis   Noted on CT scan. Likely 2/2 pelvic fluid collection. Creatnine is wnl at 0.78. Suspect this will improve with drainage of the pelvic fluid.   - Monitor renal function    Postpartum state  Failed vacuum extraction S/p Caesarian section on 3/5/2023  Of note, Pt reports she will not longer be breast feeding/pumping and is switching to 100% formula feeding.     Diet: Regular Diet Adult  DVT Prophylaxis: Pneumatic Compression Devices  Daniels Catheter: Not present  Lines: None     Cardiac Monitoring: None  Code Status: Full Code     Clinically Significant Risk Factors Present on  "Admission        # Hypokalemia: Lowest K = 2.6 mmol/L in last 2 days, will replace as needed       # Hypoalbuminemia: Lowest albumin = 2.2 g/dL at 3/29/2023 12:04 PM, will monitor as appropriate          # Overweight: Estimated body mass index is 28.15 kg/m  as calculated from the following:    Height as of 3/3/23: 1.626 m (5' 4\").    Weight as of this encounter: 74.4 kg (164 lb).           Disposition Plan        Cordelia Briggs MD  Hospitalist Service  [facility]  Securely message with [employer] (more info)  Text page via [facility] Paging/Directory     ______________________________________________________________________    Chief Complaint   Surgical site drainage     History is obtained from the patient    History of Present Illness   Aicha Draper is a 37 year old female who is s/p C section on 3/5/2023. She saw her OB on 3/27 after she noted that her C section incision opened up and started to ooze purulent material. Prior to that she felt like she was healing well, and did not have significant pain. She was started on Keflex at this time. She was seen in follow-up today and had persistent drainage from her incision. She denies fevers/chills. She has noticed progressively  worsening bilateral lower extremity swelling since her delivery, but particularly in the past several days. She denies shortness of breath or chest pain. She denies on-going vaginal bleeding. She did note some darker stools the past few days but nothing tarry in appearance. No overt GI bleeding noted. She has been eating and drinking normally. She had been pumping, but recently has decided to switch to 100% formula feeding.       Past Medical History    Past Medical History:   Diagnosis Date     Atypical squamous cells of undetermined significance (ASCUS) on Papanicolaou smear of cervix 12/2015    ASCUS cyto, repeat pap 1 yr     In vitro fertilization      PCOS (polycystic ovarian syndrome)        Past Surgical " History   Past Surgical History:   Procedure Laterality Date      SECTION N/A 3/5/2023    Procedure:  section;  Surgeon: Guerda Hamm MD;  Location:  L+D     CHOLECYSTECTOMY, LAPOROSCOPIC  2010    Cholecystectomy, Laparoscopic     SURGICAL HISTORY OF -       STOMACH SURGERY REMOVE HAIRBALL     ZZC APPENDECTOMY      incidental with above       Prior to Admission Medications   Prior to Admission Medications   Prescriptions Last Dose Informant Patient Reported? Taking?   Prenatal Vit-Fe Fumarate-FA (PRENATAL MULTIVITAMIN W/IRON) 27-0.8 MG tablet 3/28/2023 at pm Self Yes Yes   Sig: Take 1 tablet by mouth daily   acetaminophen (TYLENOL) 325 MG tablet 3/29/2023 at am Self No Yes   Sig: Take 3 tablets (975 mg) by mouth every 6 hours   calcium carbonate (TUMS) 500 MG chewable tablet  Self Yes Yes   Sig: Take 2 chew tab by mouth 4 times daily as needed for heartburn   cephALEXin (KEFLEX) 500 MG capsule 3/29/2023 at x2 Self Yes Yes   Sig: Take 500 mg by mouth 3 times daily   ferrous sulfate (FEROSUL) 325 (65 Fe) MG tablet 3/29/2023 at am Self No Yes   Sig: Take 1 tablet (325 mg) by mouth 2 times daily   fish oil-omega-3 fatty acids 1000 MG capsule 3/28/2023 at pm Self Yes Yes   Sig: Take 2 g by mouth every evening   furosemide (LASIX) 20 MG tablet 3/29/2023 at am Self Yes Yes   Sig: Take 1 tablet by mouth daily at 2 pm   ibuprofen (ADVIL/MOTRIN) 800 MG tablet 3/29/2023 at am Self No Yes   Sig: Take 1 tablet (800 mg) by mouth every 6 hours   Patient taking differently: Take 800 mg by mouth every 6 hours as needed   omeprazole (PRILOSEC) 20 MG DR capsule 3/28/2023 Self Yes Yes   Sig: Take 20 mg by mouth every evening   vitamin C (ASCORBIC ACID) 1000 MG TABS 3/29/2023 at am Self Yes Yes   Sig: Take 1,000 mg by mouth daily   vitamin D3 (CHOLECALCIFEROL) 50 mcg (2000 units) tablet 3/29/2023 at am Self Yes Yes   Sig: Take 1 tablet by mouth daily      Facility-Administered Medications: None         Review of Systems    The 10 point Review of Systems is negative other than noted in the HPI or here.     Physical Exam   Vital Signs: Temp: 98.8  F (37.1  C) Temp src: Temporal BP: 138/74 Pulse: 84   Resp: 18 SpO2: 99 % O2 Device: None (Room air)    Weight: 164 lbs 0 oz    Constitutional: Awake, alert, cooperative, no apparent distress, tearful  Eyes: Conjunctiva and pupils examined and normal.  HEENT: Moist mucous membranes, normal dentition.  Respiratory: Clear to auscultation bilaterally, no crackles or wheezing.  Cardiovascular: Regular rate and rhythm, normal S1 and S2, and no murmur noted.  GI: Soft, non-distended, low transverse abdominal incision that has opened near the midline draining a significant amount of purulent drainage. There is no surrounding erythema of the skin. .  Skin: No rashes, no cyanosis, 2+ bilateral lower extremity edema. Pale.   Musculoskeletal: No joint swelling, erythema or tenderness.  Neurologic: Cranial nerves 2-12 intact, normal strength and sensation.  Psychiatric: Alert, oriented to person, place and time,  tearful      Medical Decision Making       100 MINUTES SPENT BY ME on the date of service doing chart review, history, exam, documentation & further activities per the note.      Data     I have personally reviewed the following data over the past 24 hrs:    7.3  \   6.6 (LL)   / 571 (H)     142 106 11.8 /  92   2.6 (LL) 22 0.78 \       ALT: 14 AST: 15 AP: 120 (H) TBILI: 0.2   ALB: 2.2 (L) TOT PROTEIN: 5.3 (L) LIPASE: N/A       Trop: N/A BNP: 1,102 (H)       Procal: N/A CRP: 297.22 (H) Lactic Acid: N/A       Ferritin:  N/A % Retic:  0.5 LDH:  N/A       Imaging results reviewed over the past 24 hrs:   Recent Results (from the past 24 hour(s))   US Lower Extremity Venous Duplex Bilateral    Narrative    VENOUS ULTRASOUND BOTH LEGS  3/29/2023 3:15 PM     HISTORY: swelling, DVT    COMPARISON: None.    FINDINGS:  Examination of the deep veins with graded compression  and  color flow Doppler with spectral wave form analysis was performed.   There is no evidence for DVT in the visualized veins of the lower  extremities.      Impression    IMPRESSION: No evidence of deep venous thrombosis.    NINA TOM MD         SYSTEM ID:  [id number]   CT Chest (PE) Abdomen Pelvis w Contrast    Narrative    CT CHEST PE ABDOMEN AND PELVIS WITH CONTRAST 3/29/2023 3:25 PM    CLINICAL HISTORY: SOB, PE,  wound infection.    TECHNIQUE: CT angiogram chest and routine CT abdomen pelvis with IV  contrast. Arterial phase through the chest and venous phase through  the abdomen and pelvis. 2D and 3D MIP reconstructions were preformed  by the CT technologist. Dose reduction techniques were used.     CONTRAST: 82mL Isovue-370    COMPARISON: None.    FINDINGS:  ANGIOGRAM CHEST: Pulmonary arteries are normal caliber and negative  for pulmonary emboli. Thoracic aorta is negative for dissection. No CT  evidence of right heart strain.     LUNGS AND PLEURA: Small bilateral pleural effusions. Adjacent  compressive atelectasis. Mild groundglass opacity in the upper lungs  may represent additional atelectasis or edema.    MEDIASTINUM/AXILLAE: No lymphadenopathy. No coronary artery  calcification.    HEPATOBILIARY: Cholecystectomy.    PANCREAS: Normal.    SPLEEN: Normal.    ADRENAL GLANDS: Normal.    KIDNEYS/BLADDER: Mild right hydronephrosis. The right ureter is  dilated into the renal pelvis. No obstructing stone.    BOWEL: Normal.    LYMPH NODES: Normal.    PELVIC ORGANS: Uterus is unremarkable.    OTHER: There is a fluid collection in the pelvic cul-de-sac measuring  8 x 7 cm. There is a thin loculated fluid collection in the peritoneal  cavity for example on series 8 image 151 measuring 2 cm maximum  thickness. This does contain a small amount of air superiorly.    MUSCULOSKELETAL: Normal.      Impression    IMPRESSION:  1.  No evidence of pulmonary embolus.  2.  Loculated fluid collections in the  pelvic cul-de-sac and the lower  abdomen may represent abscesses.  3.  Mild right hydronephrosis of uncertain etiology, possibly  secondary to the pelvic fluid collection.  4.  Small bilateral pleural effusions.    SILVIA NAJERA MD         SYSTEM ID:  [id number]

## 2023-03-29 NOTE — PHARMACY-ADMISSION MEDICATION HISTORY
Pharmacy Medication History  Admission medication history interview status for the 3/29/2023  admission is complete. See EPIC admission navigator for prior to admission medications     Location of Interview: Patient room  Medication history sources: Patient, Patient's family/friend (), Surescripts and Care Everywhere    Significant changes made to the medication list:  Added: Fish oil, vitamin D3, vitamin C, omeprazole  Removed: oxycodone, levothyroxine    In the past week, patient estimated taking medication this percent of the time: greater than 90%    Additional medication history information:   Recent antimicrobials:  On 3/27/23, patient was prescribed Keflex 500mg TID x 7 days. Patient has taken 8 doses of the course of antibiotics.     Medication reconciliation completed by provider prior to medication history? No    Time spent in this activity: 10 mins    Prior to Admission medications    Medication Sig Last Dose Taking? Auth Provider Long Term End Date   acetaminophen (TYLENOL) 325 MG tablet Take 3 tablets (975 mg) by mouth every 6 hours 3/29/2023 at am Yes Zora Wilcox MD     calcium carbonate (TUMS) 500 MG chewable tablet Take 2 chew tab by mouth 4 times daily as needed for heartburn  Yes Reported, Patient     cephALEXin (KEFLEX) 500 MG capsule Take 500 mg by mouth 3 times daily 3/29/2023 at x2 Yes Reported, Patient  4/2/23   ferrous sulfate (FEROSUL) 325 (65 Fe) MG tablet Take 1 tablet (325 mg) by mouth 2 times daily 3/29/2023 at am Yes Zora Wilcox MD     fish oil-omega-3 fatty acids 1000 MG capsule Take 2 g by mouth every evening 3/28/2023 at pm Yes Unknown, Entered By History     furosemide (LASIX) 20 MG tablet Take 1 tablet by mouth daily at 2 pm 3/29/2023 at am Yes Reported, Patient Yes    ibuprofen (ADVIL/MOTRIN) 800 MG tablet Take 1 tablet (800 mg) by mouth every 6 hours  Patient taking differently: Take 800 mg by mouth every 6 hours as needed 3/29/2023 at am Yes  Zora Wilcox MD     omeprazole (PRILOSEC) 20 MG DR capsule Take 20 mg by mouth every evening 3/28/2023 Yes Unknown, Entered By History     Prenatal Vit-Fe Fumarate-FA (PRENATAL MULTIVITAMIN W/IRON) 27-0.8 MG tablet Take 1 tablet by mouth daily 3/28/2023 at pm Yes Unknown, Entered By History     vitamin C (ASCORBIC ACID) 1000 MG TABS Take 1,000 mg by mouth daily 3/29/2023 at am Yes Unknown, Entered By History     vitamin D3 (CHOLECALCIFEROL) 50 mcg (2000 units) tablet Take 1 tablet by mouth daily 3/29/2023 at am Yes Unknown, Entered By History         The information provided in this note is only as accurate as the sources available at the time of update(s)

## 2023-03-30 ENCOUNTER — APPOINTMENT (OUTPATIENT)
Dept: CARDIOLOGY | Facility: CLINIC | Age: 38
End: 2023-03-30
Attending: STUDENT IN AN ORGANIZED HEALTH CARE EDUCATION/TRAINING PROGRAM
Payer: COMMERCIAL

## 2023-03-30 ENCOUNTER — APPOINTMENT (OUTPATIENT)
Dept: CT IMAGING | Facility: CLINIC | Age: 38
End: 2023-03-30
Attending: RADIOLOGY
Payer: COMMERCIAL

## 2023-03-30 LAB
ALBUMIN SERPL BCG-MCNC: 2.3 G/DL (ref 3.5–5.2)
ALP SERPL-CCNC: 107 U/L (ref 35–104)
ALT SERPL W P-5'-P-CCNC: 14 U/L (ref 10–35)
ANION GAP SERPL CALCULATED.3IONS-SCNC: 13 MMOL/L (ref 7–15)
AST SERPL W P-5'-P-CCNC: 21 U/L (ref 10–35)
ATRIAL RATE - MUSE: 81 BPM
BILIRUB SERPL-MCNC: 0.2 MG/DL
BUN SERPL-MCNC: 10.7 MG/DL (ref 6–20)
CALCIUM SERPL-MCNC: 8.3 MG/DL (ref 8.6–10)
CHLORIDE SERPL-SCNC: 103 MMOL/L (ref 98–107)
CREAT SERPL-MCNC: 0.76 MG/DL (ref 0.51–0.95)
CRP SERPL-MCNC: 271.95 MG/L
DEPRECATED HCO3 PLAS-SCNC: 24 MMOL/L (ref 22–29)
DIASTOLIC BLOOD PRESSURE - MUSE: NORMAL MMHG
ERYTHROCYTE [DISTWIDTH] IN BLOOD BY AUTOMATED COUNT: 14.3 % (ref 10–15)
GFR SERPL CREATININE-BSD FRML MDRD: >90 ML/MIN/1.73M2
GLUCOSE SERPL-MCNC: 90 MG/DL (ref 70–99)
GRAM STAIN RESULT: ABNORMAL
HCT VFR BLD AUTO: 24.4 % (ref 35–47)
HGB BLD-MCNC: 8 G/DL (ref 11.7–15.7)
INTERPRETATION ECG - MUSE: NORMAL
LVEF ECHO: NORMAL
MAGNESIUM SERPL-MCNC: 2.1 MG/DL (ref 1.7–2.3)
MAGNESIUM SERPL-MCNC: 2.1 MG/DL (ref 1.7–2.3)
MCH RBC QN AUTO: 29.1 PG (ref 26.5–33)
MCHC RBC AUTO-ENTMCNC: 32.8 G/DL (ref 31.5–36.5)
MCV RBC AUTO: 89 FL (ref 78–100)
P AXIS - MUSE: -15 DEGREES
PLATELET # BLD AUTO: 546 10E3/UL (ref 150–450)
POTASSIUM SERPL-SCNC: 2.6 MMOL/L (ref 3.4–5.3)
POTASSIUM SERPL-SCNC: 3.1 MMOL/L (ref 3.4–5.3)
POTASSIUM SERPL-SCNC: 3.9 MMOL/L (ref 3.4–5.3)
PR INTERVAL - MUSE: 132 MS
PROT SERPL-MCNC: 5.3 G/DL (ref 6.4–8.3)
QRS DURATION - MUSE: 88 MS
QT - MUSE: 358 MS
QTC - MUSE: 415 MS
R AXIS - MUSE: 75 DEGREES
RBC # BLD AUTO: 2.75 10E6/UL (ref 3.8–5.2)
SARS-COV-2 RNA RESP QL NAA+PROBE: NEGATIVE
SODIUM SERPL-SCNC: 140 MMOL/L (ref 136–145)
SYSTOLIC BLOOD PRESSURE - MUSE: NORMAL MMHG
T AXIS - MUSE: 51 DEGREES
VENTRICULAR RATE- MUSE: 81 BPM
WBC # BLD AUTO: 8.3 10E3/UL (ref 4–11)

## 2023-03-30 PROCEDURE — 99233 SBSQ HOSP IP/OBS HIGH 50: CPT | Performed by: HOSPITALIST

## 2023-03-30 PROCEDURE — 87076 CULTURE ANAEROBE IDENT EACH: CPT | Performed by: RADIOLOGY

## 2023-03-30 PROCEDURE — 84132 ASSAY OF SERUM POTASSIUM: CPT | Performed by: STUDENT IN AN ORGANIZED HEALTH CARE EDUCATION/TRAINING PROGRAM

## 2023-03-30 PROCEDURE — 999N000154 HC STATISTIC RADIOLOGY XRAY, US, CT, MAR, NM

## 2023-03-30 PROCEDURE — 120N000001 HC R&B MED SURG/OB

## 2023-03-30 PROCEDURE — 255N000002 HC RX 255 OP 636: Performed by: EMERGENCY MEDICINE

## 2023-03-30 PROCEDURE — G0463 HOSPITAL OUTPT CLINIC VISIT: HCPCS | Mod: 25

## 2023-03-30 PROCEDURE — 272N000431 CT ABDOMEN PERITONEUM ABSCESS DRAIN W CATH PLACE

## 2023-03-30 PROCEDURE — 99153 MOD SED SAME PHYS/QHP EA: CPT

## 2023-03-30 PROCEDURE — 80053 COMPREHEN METABOLIC PANEL: CPT | Performed by: STUDENT IN AN ORGANIZED HEALTH CARE EDUCATION/TRAINING PROGRAM

## 2023-03-30 PROCEDURE — 250N000013 HC RX MED GY IP 250 OP 250 PS 637: Performed by: HOSPITALIST

## 2023-03-30 PROCEDURE — 0W9J30Z DRAINAGE OF PELVIC CAVITY WITH DRAINAGE DEVICE, PERCUTANEOUS APPROACH: ICD-10-PCS | Performed by: RADIOLOGY

## 2023-03-30 PROCEDURE — 250N000011 HC RX IP 250 OP 636: Performed by: RADIOLOGY

## 2023-03-30 PROCEDURE — 87075 CULTR BACTERIA EXCEPT BLOOD: CPT | Performed by: RADIOLOGY

## 2023-03-30 PROCEDURE — 86140 C-REACTIVE PROTEIN: CPT | Performed by: STUDENT IN AN ORGANIZED HEALTH CARE EDUCATION/TRAINING PROGRAM

## 2023-03-30 PROCEDURE — 85027 COMPLETE CBC AUTOMATED: CPT | Performed by: STUDENT IN AN ORGANIZED HEALTH CARE EDUCATION/TRAINING PROGRAM

## 2023-03-30 PROCEDURE — 84132 ASSAY OF SERUM POTASSIUM: CPT | Performed by: HOSPITALIST

## 2023-03-30 PROCEDURE — 87205 SMEAR GRAM STAIN: CPT | Performed by: RADIOLOGY

## 2023-03-30 PROCEDURE — 250N000009 HC RX 250: Performed by: EMERGENCY MEDICINE

## 2023-03-30 PROCEDURE — 250N000011 HC RX IP 250 OP 636: Performed by: OBSTETRICS & GYNECOLOGY

## 2023-03-30 PROCEDURE — 250N000011 HC RX IP 250 OP 636: Performed by: HOSPITALIST

## 2023-03-30 PROCEDURE — 250N000013 HC RX MED GY IP 250 OP 250 PS 637: Performed by: STUDENT IN AN ORGANIZED HEALTH CARE EDUCATION/TRAINING PROGRAM

## 2023-03-30 PROCEDURE — 0W9G30Z DRAINAGE OF PERITONEAL CAVITY WITH DRAINAGE DEVICE, PERCUTANEOUS APPROACH: ICD-10-PCS | Performed by: RADIOLOGY

## 2023-03-30 PROCEDURE — 99254 IP/OBS CNSLTJ NEW/EST MOD 60: CPT | Performed by: SPECIALIST

## 2023-03-30 PROCEDURE — 49406 IMAGE CATH FLUID PERI/RETRO: CPT

## 2023-03-30 PROCEDURE — 93306 TTE W/DOPPLER COMPLETE: CPT | Mod: 26 | Performed by: INTERNAL MEDICINE

## 2023-03-30 PROCEDURE — 83735 ASSAY OF MAGNESIUM: CPT | Performed by: HOSPITALIST

## 2023-03-30 PROCEDURE — U0005 INFEC AGEN DETEC AMPLI PROBE: HCPCS | Performed by: HOSPITALIST

## 2023-03-30 PROCEDURE — 999N000208 ECHOCARDIOGRAM COMPLETE

## 2023-03-30 PROCEDURE — 36415 COLL VENOUS BLD VENIPUNCTURE: CPT | Performed by: HOSPITALIST

## 2023-03-30 PROCEDURE — 99152 MOD SED SAME PHYS/QHP 5/>YRS: CPT

## 2023-03-30 PROCEDURE — 250N000011 HC RX IP 250 OP 636: Performed by: STUDENT IN AN ORGANIZED HEALTH CARE EDUCATION/TRAINING PROGRAM

## 2023-03-30 PROCEDURE — C1729 CATH, DRAINAGE: HCPCS

## 2023-03-30 PROCEDURE — 36415 COLL VENOUS BLD VENIPUNCTURE: CPT | Performed by: STUDENT IN AN ORGANIZED HEALTH CARE EDUCATION/TRAINING PROGRAM

## 2023-03-30 RX ORDER — HYDROMORPHONE HYDROCHLORIDE 1 MG/ML
0.5 INJECTION, SOLUTION INTRAMUSCULAR; INTRAVENOUS; SUBCUTANEOUS ONCE
Status: COMPLETED | OUTPATIENT
Start: 2023-03-30 | End: 2023-03-30

## 2023-03-30 RX ORDER — NALOXONE HYDROCHLORIDE 0.4 MG/ML
0.4 INJECTION, SOLUTION INTRAMUSCULAR; INTRAVENOUS; SUBCUTANEOUS
Status: DISCONTINUED | OUTPATIENT
Start: 2023-03-30 | End: 2023-03-30

## 2023-03-30 RX ORDER — AMOXICILLIN 250 MG
1 CAPSULE ORAL DAILY
Status: DISCONTINUED | OUTPATIENT
Start: 2023-03-30 | End: 2023-03-31

## 2023-03-30 RX ORDER — FENTANYL CITRATE 50 UG/ML
25-50 INJECTION, SOLUTION INTRAMUSCULAR; INTRAVENOUS EVERY 5 MIN PRN
Status: DISCONTINUED | OUTPATIENT
Start: 2023-03-30 | End: 2023-03-30

## 2023-03-30 RX ORDER — HYDROMORPHONE HCL IN WATER/PF 6 MG/30 ML
0.4 PATIENT CONTROLLED ANALGESIA SYRINGE INTRAVENOUS EVERY 6 HOURS PRN
Status: DISCONTINUED | OUTPATIENT
Start: 2023-03-30 | End: 2023-03-31

## 2023-03-30 RX ORDER — NALOXONE HYDROCHLORIDE 0.4 MG/ML
0.4 INJECTION, SOLUTION INTRAMUSCULAR; INTRAVENOUS; SUBCUTANEOUS
Status: DISCONTINUED | OUTPATIENT
Start: 2023-03-30 | End: 2023-04-05 | Stop reason: HOSPADM

## 2023-03-30 RX ORDER — PANTOPRAZOLE SODIUM 40 MG/1
40 TABLET, DELAYED RELEASE ORAL EVERY EVENING
Status: DISCONTINUED | OUTPATIENT
Start: 2023-03-30 | End: 2023-04-05 | Stop reason: HOSPADM

## 2023-03-30 RX ORDER — POTASSIUM CHLORIDE 1.5 G/1.58G
20 POWDER, FOR SOLUTION ORAL ONCE
Status: COMPLETED | OUTPATIENT
Start: 2023-03-30 | End: 2023-03-30

## 2023-03-30 RX ORDER — FLUMAZENIL 0.1 MG/ML
0.2 INJECTION, SOLUTION INTRAVENOUS
Status: DISCONTINUED | OUTPATIENT
Start: 2023-03-30 | End: 2023-03-30

## 2023-03-30 RX ORDER — NALOXONE HYDROCHLORIDE 0.4 MG/ML
0.2 INJECTION, SOLUTION INTRAMUSCULAR; INTRAVENOUS; SUBCUTANEOUS
Status: DISCONTINUED | OUTPATIENT
Start: 2023-03-30 | End: 2023-04-05 | Stop reason: HOSPADM

## 2023-03-30 RX ORDER — CALCIUM CARBONATE 500 MG/1
1000 TABLET, CHEWABLE ORAL 4 TIMES DAILY PRN
Status: DISCONTINUED | OUTPATIENT
Start: 2023-03-30 | End: 2023-04-05 | Stop reason: HOSPADM

## 2023-03-30 RX ORDER — POTASSIUM CHLORIDE 1.5 G/1.58G
40 POWDER, FOR SOLUTION ORAL ONCE
Status: COMPLETED | OUTPATIENT
Start: 2023-03-30 | End: 2023-03-30

## 2023-03-30 RX ORDER — NALOXONE HYDROCHLORIDE 0.4 MG/ML
0.2 INJECTION, SOLUTION INTRAMUSCULAR; INTRAVENOUS; SUBCUTANEOUS
Status: DISCONTINUED | OUTPATIENT
Start: 2023-03-30 | End: 2023-03-30

## 2023-03-30 RX ADMIN — ACETAMINOPHEN 650 MG: 325 TABLET, FILM COATED ORAL at 12:58

## 2023-03-30 RX ADMIN — ACETAMINOPHEN 650 MG: 325 TABLET, FILM COATED ORAL at 15:26

## 2023-03-30 RX ADMIN — HUMAN ALBUMIN MICROSPHERES AND PERFLUTREN 3 ML: 10; .22 INJECTION, SOLUTION INTRAVENOUS at 08:41

## 2023-03-30 RX ADMIN — POTASSIUM CHLORIDE 40 MEQ: 1.5 POWDER, FOR SOLUTION ORAL at 01:14

## 2023-03-30 RX ADMIN — POTASSIUM CHLORIDE 40 MEQ: 1.5 POWDER, FOR SOLUTION ORAL at 08:26

## 2023-03-30 RX ADMIN — HYDROMORPHONE HYDROCHLORIDE 0.5 MG: 1 INJECTION, SOLUTION INTRAMUSCULAR; INTRAVENOUS; SUBCUTANEOUS at 14:11

## 2023-03-30 RX ADMIN — PIPERACILLIN AND TAZOBACTAM 3.38 G: 3; .375 INJECTION, POWDER, FOR SOLUTION INTRAVENOUS at 08:26

## 2023-03-30 RX ADMIN — MIDAZOLAM 1 MG: 1 INJECTION INTRAMUSCULAR; INTRAVENOUS at 09:44

## 2023-03-30 RX ADMIN — PIPERACILLIN AND TAZOBACTAM 3.38 G: 3; .375 INJECTION, POWDER, FOR SOLUTION INTRAVENOUS at 15:27

## 2023-03-30 RX ADMIN — FENTANYL CITRATE 50 MCG: 50 INJECTION, SOLUTION INTRAMUSCULAR; INTRAVENOUS at 09:45

## 2023-03-30 RX ADMIN — ACETAMINOPHEN 650 MG: 325 TABLET, FILM COATED ORAL at 02:57

## 2023-03-30 RX ADMIN — OXYCODONE HYDROCHLORIDE 5 MG: 5 TABLET ORAL at 20:25

## 2023-03-30 RX ADMIN — HYDROMORPHONE HYDROCHLORIDE 0.4 MG: 0.2 INJECTION, SOLUTION INTRAMUSCULAR; INTRAVENOUS; SUBCUTANEOUS at 18:28

## 2023-03-30 RX ADMIN — PANTOPRAZOLE SODIUM 40 MG: 40 TABLET, DELAYED RELEASE ORAL at 20:25

## 2023-03-30 RX ADMIN — PIPERACILLIN AND TAZOBACTAM 3.38 G: 3; .375 INJECTION, POWDER, FOR SOLUTION INTRAVENOUS at 02:43

## 2023-03-30 RX ADMIN — PIPERACILLIN AND TAZOBACTAM 3.38 G: 3; .375 INJECTION, POWDER, FOR SOLUTION INTRAVENOUS at 21:26

## 2023-03-30 RX ADMIN — LIDOCAINE HYDROCHLORIDE 10 ML: 10 INJECTION, SOLUTION EPIDURAL; INFILTRATION; INTRACAUDAL; PERINEURAL at 09:44

## 2023-03-30 RX ADMIN — OXYCODONE HYDROCHLORIDE 5 MG: 5 TABLET ORAL at 16:09

## 2023-03-30 RX ADMIN — POTASSIUM CHLORIDE 20 MEQ: 1.5 POWDER, FOR SOLUTION ORAL at 02:43

## 2023-03-30 ASSESSMENT — ACTIVITIES OF DAILY LIVING (ADL)
ADLS_ACUITY_SCORE: 37
ADLS_ACUITY_SCORE: 35
ADLS_ACUITY_SCORE: 35
ADLS_ACUITY_SCORE: 37
ADLS_ACUITY_SCORE: 35

## 2023-03-30 NOTE — PROGRESS NOTES
RECEIVING UNIT ED HANDOFF REVIEW    ED Nurse Handoff Report was reviewed by: Sandra Tyson RN on March 30, 2023 at 1:51 PM

## 2023-03-30 NOTE — CONSULTS
[facility] Nurse Inpatient Assessment     Consulted for: Suprapubic wound    Patient History (according to provider note(s):      Aicha Draper is a 37 year old female who is s/p C section on 3/5/2023 admitted on 3/29/2023 with surgical site infection, severe anemia, and bilateral lower extremity edema.       Pelvic abscesses   Surgical site infection   Pt is s/p C section on 3/5/2023. She saw her OB on 3/27 after she noted that her C section incision opened up and started to ooze purulent material. She was started on Keflex at this time. She was seen in follow-up today and had persistent drainage from her incision as well as significant bilateral lower extremity swelling (see below) so was sent to the ED for further evaluation.   *On examination, she has marked purulent drainage coming from her surgical incision. There does not appear to be surrounding cellulitis of the skin.  * CT of the abdomen showed loculated fluid collections in the pelvic cul-de-sac and lower abdomen which may represent abscess (fluid collection measures 8 x 7 cm).   * Pt was given a dose of Ancef prior to CT results   * The patient is hemodynamically stable and non-septic appearing   - Admit to inpatient   - Will broaden to zosyn given the intra-abdominal fluid collections  - Blood cultures ordered (collected after receiving Ancef)   - Will obtain cultures of the purulent drainage   - IR consultation for consideration of percutaneous drainage   - OB/Gyn Consultation   - Consider ID consult for antibiotic guidance        Areas Assessed:      Areas visualized during today's visit: Abdomen    Wound location: suprapubic      Last photo: none taken  Wound due to: Surgical Wound  Wound history/plan of care: 2023 had low transverse  section. Pt was recovering well but developed drainage from her incision about a week ago. Was having copious purulent drainage in clinic on  Monday and started  "on Keflex   Wound base: 80 % red moist dermis, 20 % adipose tissue     Palpation of the wound bed: normal      Drainage: small     Description of drainage: serosanguinous     Measurements (length x width x depth, in cm): 0.1  x 2.5  x  2 cm linear open incision     Tunneling: up to 2 cm at 9 o'clock     Undermining: N/A  Periwound skin: Intact      Color: normal and consistent with surrounding tissue      Temperature: normal   Odor: none  Pain: mild, aching, sharp and stinging  Pain interventions prior to dressing change: soaking and slow and gentle cares   Treatment goal: Heal , Drainage control and Increase granulation  STATUS: initial assessment  Supplies ordered: at bedside and ordered 1\" nugauze       Treatment Plan:     Suprapubic wound: BID and PRN  1. Cleanse wound with Vashe #[id number]). Then gently dry out wound bed with dry gauze  2. Moisten packing gauze or kerlix with vashe, wring out excess so it is damp and gently pack into wound (this will remain in wound as primary dressing)  3. Apply Cavilon No Sting Skin Prep (#[id number]) to periwound and allow to dry.  4.  Cover wound with ABD and secure with Mesh underwear  5. Time and date dressing change      Orders: Written    RECOMMEND PRIMARY TEAM ORDER: None, at this time  Education provided: plan of care, Infection prevention , Moisture management and Hygiene  Discussed plan of care with: Patient, Nurse and Physician  WOC nurse follow-up plan: twice weekly  Notify WOC if wound(s) deteriorate.  Nursing to notify the Provider(s) and re-consult the WOC Nurse if new skin concern.    DATA:     Current support surface: Standard  ED cart  Containment of urine/stool: Purewick external catheter   BMI: Body mass index is 28.15 kg/m .   Active diet order: Orders Placed This Encounter      Regular Diet Adult     Output: I/O last 3 completed shifts:  In: 470 [Other:20; IV Piggyback:200]  Out: 2500 [Urine:2500]     Labs: Recent Labs   Lab 03/30/23  0709   ALBUMIN 2.3*   HGB " 8.0*   WBC 8.3     Pressure injury risk assessment:                          Marek Andrade, RN CWOCN  -Securely message with [employer] (more info) - can reach individually by name or search 'WOC Nurse' (Diana) to reach all current WOCs on duty.  WOC Office Phone: [phone]

## 2023-03-30 NOTE — LACTATION NOTE
"Lactation visit with Allyson today.    Allyson re-admitted for complications from her C/S. Allyson has been breastfeeding and pumping but with her complications and re-admission, Allyson has made the decision to cease lactation.    RENATO printed off information to give Allyson on how to gradually wean from pumping.  Along with comfort techniques to combat engorgement.    Allyson shares with RENATO that she was down to about 4-5 pumping sessions a day prior to hospital re-admission and hasn't pumped at all since yesterday (3/29) morning at 0930. Allyson describes feeling a little \"full, pressure\" but not overly uncomfortable. Since Allyson has already gone well over 24 hours without pumping, LC suggests just continues to not pump. Weaning is mostly about mitigating discomfort, so  offered suggestions of wearing a tight sports bra, applying ice/cold packs, and pain medication. Along with avoiding stimulation to the breasts.     brought Allyson disposable breast pads and a stretchy abdominal binder to wear as a \"sports bra\".  helped Allyson put the sports bra on. Allyson states \"this feels much more comfortable\"!    Allyson has pain medications ordered. Primary RN talked with RENATO about plan of care.    Advised Allyson she can reach out with additional questions if they arise.    Dayanna Cochran, RN IBCLC  "

## 2023-03-30 NOTE — PROGRESS NOTES
[facility]    Medicine Progress Note - Hospitalist Service    Date of Admission:  3/29/2023    Assessment & Plan   Aicha Draper, 37 year old female, with past medical history of  3/5/23 admitted for anemia, lower extremity edema, and concerns of pelvic abscess on CT following surgery.    Pelvic abscess  Status post  3/5/2023  Anemia with acute blood loss, s/p surgery, s/p RBC transfusion  Status post  3/5/2023.  Healthy baby boy.  Hospital admission for abdominal pain, lower extremity edema, and CT abdomen concerning for pelvic abscess.  For details, see admission note.  Interventional radiology, gynecology, infectious disease consulted.    Interventional radiology consult 3/30/2023 with CT-guided drain placement x2 without complication.  Gynecology formally consulted, await recommendations.  Previous  surgery on 3/5/2023 with postoperative infection.  Continue IV Zosyn ordered on admission.  Infectious disease consulted for assistance with antibiotic recommendations with complicated pelvic infection following surgery.  Labs reviewed with morning labs as ordered.  Await culture results.  Pain control.  Bowel regimen to reduce risk of constipation.  Regular diet if no surgical intervention planned per gynecology.  Monitor hemoglobin, transfuse as needed, admission hemoglobin 6.6, last check 8.0.    Wide-complex tachycardia, 9 beats at 7 AM 3/30/2023  Hypokalemia    Asymptomatic tachycardia, limited, 9 beats, early morning.  No history of heart disease.  Monitor.  Hypokalemia, replace per protocol.  Check serum magnesium.  Transthoracic echocardiogram normal, see report.  Continue close monitoring with ongoing cardiac telemetry.  Consider formal cardiology/electrophysiology consultation if recurrent tachycardia develops.  Discussed with patient and her  and in agreement.  Monitor hemoglobin as well.    Lower extremity edema with  "hypoalbuminemia  Small, bilateral pleural effusions on admission chest x-ray    Low albumin, 2.3 on 3/30/2023.  Suspect lower extremity edema related to hypoalbuminemia in the setting of recent pregnancy, and surgery.  Nutrition consult.  Elevation of legs to reduce edema discussed.  Consider Ace bandaging or compression stockings.  Lower extremity ultrasound on admission negative for DVT.  Monitor.  Surgery to also review.    Mild right hydronephroses    Mild hydronephrosis noted on admission CT scan, see report.  Admission creatinine satisfactory.  Monitor urine output and renal function.  Gynecology to review in the setting of recent surgery and pelvic infection.    Disposition     Estimated length of stay 48 to 72 hours    Anticipated discharge to home with        Diet: NPO per Anesthesia Guidelines for Procedure/Surgery Except for: Meds, Ice Chips    DVT Prophylaxis: Pneumatic Compression Devices  Daniels Catheter: Not present  Lines: None     Cardiac Monitoring: None  Code Status: Full Code      Clinically Significant Risk Factors Present on Admission        # Hypokalemia: Lowest K = 2.6 mmol/L in last 2 days, will replace as needed       # Hypoalbuminemia: Lowest albumin = 2.2 g/dL at 3/29/2023 12:04 PM, will monitor as appropriate          # Overweight: Estimated body mass index is 28.15 kg/m  as calculated from the following:    Height as of 3/3/23: 1.626 m (5' 4\").    Weight as of this encounter: 74.4 kg (164 lb).           Disposition Plan     Expected Discharge Date: 2023                  Rick Wren MD  Hospitalist Service  [facility]  Securely message with [employer] (more info)  Text page via [facility] Paging/Directory   ______________________________________________________________________    Interval History   First visit with patient today, in ER, admitted overnight for concerns of pelvic abscess, previous  on 3/5/2023.  Awake alert lying in bed, "  at bedside.  Interventional radiology procedure today with CT-guided drain.  Infectious disease, gynecology consulted and follow-up pending.  No report of shortness of breath.  Pain controlled.  Talkative and in good spirits.    Physical Exam   Vital Signs: Temp: 98.9  F (37.2  C) Temp src: Oral BP: 131/71 Pulse: 101   Resp: 27 SpO2: 97 % O2 Device: None (Room air) Oxygen Delivery: 2 LPM  Weight: 164 lbs 0 oz    Lying in bed,  at bedside  Lungs clear  Heart S1-S2 regular rhythm no rubs or gallops  Abdomen soft, no significant tenderness with palpation, drain in place  Extremities 1+ edema, bilateral, symmetric  Mental status normal    Medical Decision Making             Data     I have personally reviewed the following data over the past 24 hrs:    8.3  \   8.0 (L)   / 546 (H)     140 103 10.7 /  90   3.9 24 0.76 \       ALT: 14 AST: 21 AP: 107 (H) TBILI: 0.2   ALB: 2.3 (L) TOT PROTEIN: 5.3 (L) LIPASE: N/A       Trop: N/A BNP: N/A       Procal: N/A CRP: 271.95 (H) Lactic Acid: N/A       Ferritin:  N/A % Retic:  N/A LDH:  N/A       Imaging results reviewed over the past 24 hrs:   Recent Results (from the past 24 hour(s))   US Lower Extremity Venous Duplex Bilateral    Narrative    VENOUS ULTRASOUND BOTH LEGS  3/29/2023 3:15 PM     HISTORY: swelling, DVT    COMPARISON: None.    FINDINGS:  Examination of the deep veins with graded compression and  color flow Doppler with spectral wave form analysis was performed.   There is no evidence for DVT in the visualized veins of the lower  extremities.      Impression    IMPRESSION: No evidence of deep venous thrombosis.    NINA TOM MD         SYSTEM ID:  [id number]   CT Chest (PE) Abdomen Pelvis w Contrast    Narrative    CT CHEST PE ABDOMEN AND PELVIS WITH CONTRAST 3/29/2023 3:25 PM    CLINICAL HISTORY: SOB, PE,  wound infection.    TECHNIQUE: CT angiogram chest and routine CT abdomen pelvis with IV  contrast. Arterial phase through the chest and  venous phase through  the abdomen and pelvis. 2D and 3D MIP reconstructions were preformed  by the CT technologist. Dose reduction techniques were used.     CONTRAST: 82mL Isovue-370    COMPARISON: None.    FINDINGS:  ANGIOGRAM CHEST: Pulmonary arteries are normal caliber and negative  for pulmonary emboli. Thoracic aorta is negative for dissection. No CT  evidence of right heart strain.     LUNGS AND PLEURA: Small bilateral pleural effusions. Adjacent  compressive atelectasis. Mild groundglass opacity in the upper lungs  may represent additional atelectasis or edema.    MEDIASTINUM/AXILLAE: No lymphadenopathy. No coronary artery  calcification.    HEPATOBILIARY: Cholecystectomy.    PANCREAS: Normal.    SPLEEN: Normal.    ADRENAL GLANDS: Normal.    KIDNEYS/BLADDER: Mild right hydronephrosis. The right ureter is  dilated into the renal pelvis. No obstructing stone.    BOWEL: Normal.    LYMPH NODES: Normal.    PELVIC ORGANS: Uterus is unremarkable.    OTHER: There is a fluid collection in the pelvic cul-de-sac measuring  8 x 7 cm. There is a thin loculated fluid collection in the peritoneal  cavity for example on series 8 image 151 measuring 2 cm maximum  thickness. This does contain a small amount of air superiorly.    MUSCULOSKELETAL: Normal.      Impression    IMPRESSION:  1.  No evidence of pulmonary embolus.  2.  Loculated fluid collections in the pelvic cul-de-sac and the lower  abdomen may represent abscesses.  3.  Mild right hydronephrosis of uncertain etiology, possibly  secondary to the pelvic fluid collection.  4.  Small bilateral pleural effusions.    SILVIA NAJERA MD         SYSTEM ID:  [id number]   Echocardiogram Complete   Result Value    LVEF  60%    Narrative    [id number]  [id number]  [id number]  2010^JOLEEN^JOE^A     [facility]  Echocardiography Laboratory  [address] [place] [address]     Name: KATIE TENA  MRN: [id number]  : 1985  Study Date:  03/30/2023 08:21 AM  Age: 37 yrs  Gender: Female  Patient Location: [facility]  Reason For Study: Edema  Ordering Physician: JOE GOODRICH  Referring Physician: JOE GOODRICH  Performed By: DIMA Man     BSA: 1.8 m2  Height: 64 in  Weight: 164 lb  HR: 80  BP: 135/75 mmHg  ______________________________________________________________________________  Procedure  Complete Portable Echo Adult. Optison (NDC #[id number]) given intravenously.  ______________________________________________________________________________  Interpretation Summary     1. The left ventricle is normal in structure, function and size. The visual  ejection fraction is estimated at 60%.  2. The right ventricle is normal in structure, function and size.  3. No valve disease.  4. Normal IVC size, reduced collapse.     No previous echo for comparison.  ______________________________________________________________________________  Left Ventricle  The left ventricle is normal in structure, function and size. There is normal  left ventricular wall thickness. The visual ejection fraction is estimated at  60%. Left ventricular diastolic function is normal. Normal left ventricular  wall motion.     Right Ventricle  The right ventricle is normal in structure, function and size.     Atria  Normal left atrial size. Right atrial size is normal. There is no atrial shunt  seen.     Mitral Valve  The mitral valve is normal in structure and function.     Tricuspid Valve  There is trace tricuspid regurgitation. Right ventricular systolic pressure  could not be approximated due to inadequate tricuspid regurgitation.     Aortic Valve  The aortic valve is normal in structure and function.     Pulmonic Valve  The pulmonic valve is normal in structure and function.     Vessels  Normal ascending, transverse (arch), and descending aorta. Normal IVC size,  reduced collapse.     Pericardium  There is no pericardial effusion.     Rhythm  Sinus rhythm was  noted.  ______________________________________________________________________________  MMode/2D Measurements & Calculations  IVSd: 1.0 cm     LVIDd: 4.7 cm  LVIDs: 3.2 cm  LVPWd: 1.1 cm  FS: 31.7 %  LV mass(C)d: 175.1 grams  LV mass(C)dI: 97.4 grams/m2  Ao root diam: 3.0 cm  asc Aorta Diam: 2.9 cm  LVOT diam: 2.1 cm  LVOT area: 3.4 cm2  LA Volume (BP): 46.4 ml  LA Volume Index (BP): 25.8 ml/m2  RV Base: 3.4 cm  RWT: 0.46     TAPSE: 2.7 cm     Doppler Measurements & Calculations  MV E max jamshid: 107.0 cm/sec  MV A max jamshid: 79.0 cm/sec  MV E/A: 1.4  MV dec time: 0.17 sec  Pulm Sys Jamshid: 47.2 cm/sec  Pulm Levine Jamshid: 48.7 cm/sec  Pulm A Revs Jamshid: 31.8 cm/sec  Pulm S/D: 0.97  E/E' av.4  Lateral E/e': 6.4  Medial E/e': 10.3  RV S Jamshid: 11.9 cm/sec     ______________________________________________________________________________  Report approved by: Oscar Mazariegos 2023 09:17 AM

## 2023-03-30 NOTE — CONSULTS
[facility]    Infectious Disease Consultation     Date of Admission:  3/29/2023  Date of Consult: 03/30/23    Assessment:  37 year old female who had a C section on 3/5 who has been hospitalized with a post operative surgical site infection and pelvic abscess. . S/p drain placement, stain is polymicrobial cxs are pending    -Pelvic abscess following recent C section  -Severe hypokalemia  -Wide complex tachycardia   -Severe anemia  -Mild right hydronephrosis  -LE edema    Recommendations:  1. Follow cx data  2. Continue Zosyn, further antibiotic modification based on cx data  3. Follow clinical status and labs    Janette Hutchins MD    Reason for Consult    I was asked to evaluate this patient for antimicrobial recommendations for a pelvic abscess    Primary Care Physician   Physician No Ref-Primary    Chief Complaint   Surgical site infection    History is obtained from the patient and medical records    History of Present Illness   Aicha Draper is a 37 year old female who had a C section on 3/5. She was doing fairly well since discharge, she did have some pelvic pain which she assumed was expected. On 3/27 she developed significant lower extremity swelling. The following day she developed acute onset copious drainage from her surgical site and therefore presented to the ED. She denies fever, chills or sweats. CT showed Loculated fluid collections in the pelvic cul-de-sac and the lower abdomen consistent with infection.    She is s/p abdominal drain placement today. Stain shows GPC and GNB cx pending. Fever pattern and leukocytosis is improving. She is on zosyn and ID has been asked to assist with antibiotic management.    She feels better today, pelvic discomfort is better after drain placement and lower extremity edema is also much better.    Past Medical History   I have reviewed this patient's medical history and updated it with pertinent information if needed.   Past Medical History:    Diagnosis Date     Acute congestive heart failure, unspecified heart failure type (H) 3/29/2023     Atypical squamous cells of undetermined significance (ASCUS) on Papanicolaou smear of cervix 2015    ASCUS cyto, repeat pap 1 yr     In vitro fertilization      PCOS (polycystic ovarian syndrome)        Past Surgical History   I have reviewed this patient's surgical history and updated it with pertinent information if needed.  Past Surgical History:   Procedure Laterality Date      SECTION N/A 3/5/2023    Procedure:  section;  Surgeon: Guerda Hamm MD;  Location:  L+D     CHOLECYSTECTOMY, LAPOROSCOPIC  2010    Cholecystectomy, Laparoscopic     SURGICAL HISTORY OF -       STOMACH SURGERY REMOVE HAIRBALL     ZZC APPENDECTOMY      incidental with above       Prior to Admission Medications   Prior to Admission Medications   Prescriptions Last Dose Informant Patient Reported? Taking?   Prenatal Vit-Fe Fumarate-FA (PRENATAL MULTIVITAMIN W/IRON) 27-0.8 MG tablet 3/28/2023 at pm Self Yes Yes   Sig: Take 1 tablet by mouth daily   acetaminophen (TYLENOL) 325 MG tablet 3/29/2023 at am Self No Yes   Sig: Take 3 tablets (975 mg) by mouth every 6 hours   calcium carbonate (TUMS) 500 MG chewable tablet  Self Yes Yes   Sig: Take 2 chew tab by mouth 4 times daily as needed for heartburn   cephALEXin (KEFLEX) 500 MG capsule 3/29/2023 at x2 Self Yes Yes   Sig: Take 500 mg by mouth 3 times daily   ferrous sulfate (FEROSUL) 325 (65 Fe) MG tablet 3/29/2023 at am Self No Yes   Sig: Take 1 tablet (325 mg) by mouth 2 times daily   fish oil-omega-3 fatty acids 1000 MG capsule 3/28/2023 at pm Self Yes Yes   Sig: Take 2 g by mouth every evening   furosemide (LASIX) 20 MG tablet 3/29/2023 at am Self Yes Yes   Sig: Take 1 tablet by mouth daily at 2 pm   ibuprofen (ADVIL/MOTRIN) 800 MG tablet 3/29/2023 at am Self No Yes   Sig: Take 1 tablet (800 mg) by mouth every 6 hours   Patient taking differently:  Take 800 mg by mouth every 6 hours as needed   omeprazole (PRILOSEC) 20 MG DR capsule 3/28/2023 Self Yes Yes   Sig: Take 20 mg by mouth every evening   vitamin C (ASCORBIC ACID) 1000 MG TABS 3/29/2023 at am Self Yes Yes   Sig: Take 1,000 mg by mouth daily   vitamin D3 (CHOLECALCIFEROL) 50 mcg (2000 units) tablet 3/29/2023 at am Self Yes Yes   Sig: Take 1 tablet by mouth daily      Facility-Administered Medications: None     Allergies   Allergies   Allergen Reactions     No Known Drug Allergies        Immunization History   Immunization History   Administered Date(s) Administered     HEPA 07/20/2006, 12/23/2008     HepB 04/26/2000, 08/22/2000, 12/29/2000     Influenza (IIV3) PF 09/19/2011, 09/15/2012     MMR 03/19/1999     Meningococcal ACWY (Menactra ) 07/20/2006     TD,PF 7+ (Tenivac) 03/19/1999, 02/26/2020     TDAP Vaccine (Adacel) 12/23/2008       Social History   I have reviewed this patient's social history and updated it with pertinent information if needed. Aicha Draper  reports that she has never smoked. She has never used smokeless tobacco. She reports that she does not currently use alcohol. She reports that she does not use drugs.    Family History   I have reviewed this patient's family history and updated it with pertinent information if needed.   Family History   Problem Relation Age of Onset     Arthritis Father         onset 23- 24 yrs     Hypertension Mother      Hyperlipidemia Mother      Diabetes Paternal Grandmother      Arthritis Paternal Grandmother      C.A.D. Maternal Grandmother      C.A.D. Maternal Grandfather      Prostate Cancer Maternal Grandfather      C.A.D. Paternal Grandfather      Arthritis Paternal Grandfather      Breast Cancer Other        Review of Systems   The 10 point Review of Systems is as per HPI    Physical Exam   Temp: 99.9  F (37.7  C) Temp src: Oral BP: 128/72 Pulse: 94   Resp: 18 SpO2: 94 % O2 Device: None (Room air) Oxygen Delivery: 2 LPM  Vital Signs with  Ranges  Temp:  [98.9  F (37.2  C)-101.3  F (38.5  C)] 99.9  F (37.7  C)  Pulse:  [] 94  Resp:  [12-31] 18  BP: (113-145)/(56-83) 128/72  SpO2:  [93 %-100 %] 94 %  164 lbs 0 oz  Body mass index is 28.15 kg/m .    GENERAL APPEARANCE:  awake  EYES: Eyes grossly normal to inspection  RESP: lungs clear   CV: regular rates and rhythm  ABDOMEN: soft, surgical site with packing, 2 drains in place with purulent drainage  MS: trace edema  SKIN: no suspicious lesions or rashes    Data   All laboratory data reviewed  Component      Latest Ref Rng & Units 3/30/2023   WBC      4.0 - 11.0 10e3/uL 8.3   RBC Count      3.80 - 5.20 10e6/uL 2.75 (L)   Hemoglobin      11.7 - 15.7 g/dL 8.0 (L)   Hematocrit      35.0 - 47.0 % 24.4 (L)   MCV      78 - 100 fL 89   MCH      26.5 - 33.0 pg 29.1   MCHC      31.5 - 36.5 g/dL 32.8   RDW      10.0 - 15.0 % 14.3   Platelet Count      150 - 450 10e3/uL 546 (H)     Component      Latest Ref Rng & Units 3/30/2023   Sodium      136 - 145 mmol/L 140   Potassium      3.4 - 5.3 mmol/L 3.1 (L)   Chloride      98 - 107 mmol/L 103   Carbon Dioxide (CO2)      22 - 29 mmol/L 24   Anion Gap      7 - 15 mmol/L 13   Urea Nitrogen      6.0 - 20.0 mg/dL 10.7   Creatinine      0.51 - 0.95 mg/dL 0.76   Calcium      8.6 - 10.0 mg/dL 8.3 (L)   Glucose      70 - 99 mg/dL 90   Alkaline Phosphatase      35 - 104 U/L 107 (H)   AST      10 - 35 U/L 21   ALT      10 - 35 U/L 14   Protein Total      6.4 - 8.3 g/dL 5.3 (L)   Albumin      3.5 - 5.2 g/dL 2.3 (L)   Bilirubin Total      <=1.2 mg/dL 0.2     Component      Latest Ref Rng & Units 3/30/2023   CRP Inflammation      <5.00 mg/L 271.95 (H)     Microbiology  3/30 pelvic aspirate  Pelvis, Right; Aspirate    0 Result Notes  Gram Stain Result   Abnormal   4+ Gram positive cocci      4+ Gram negative bacilli      2+ WBC seen           Imaging  3/29 Ct chest PE abdomen  CT CHEST PE ABDOMEN AND PELVIS WITH CONTRAST 3/29/2023 3:25 PM     CLINICAL HISTORY: SOB, PE,   wound infection.     TECHNIQUE: CT angiogram chest and routine CT abdomen pelvis with IV  contrast. Arterial phase through the chest and venous phase through  the abdomen and pelvis. 2D and 3D MIP reconstructions were preformed  by the CT technologist. Dose reduction techniques were used.      CONTRAST: 82mL Isovue-370     COMPARISON: None.     FINDINGS:  ANGIOGRAM CHEST: Pulmonary arteries are normal caliber and negative  for pulmonary emboli. Thoracic aorta is negative for dissection. No CT  evidence of right heart strain.      LUNGS AND PLEURA: Small bilateral pleural effusions. Adjacent  compressive atelectasis. Mild groundglass opacity in the upper lungs  may represent additional atelectasis or edema.     MEDIASTINUM/AXILLAE: No lymphadenopathy. No coronary artery  calcification.     HEPATOBILIARY: Cholecystectomy.     PANCREAS: Normal.     SPLEEN: Normal.     ADRENAL GLANDS: Normal.     KIDNEYS/BLADDER: Mild right hydronephrosis. The right ureter is  dilated into the renal pelvis. No obstructing stone.     BOWEL: Normal.     LYMPH NODES: Normal.     PELVIC ORGANS: Uterus is unremarkable.     OTHER: There is a fluid collection in the pelvic cul-de-sac measuring  8 x 7 cm. There is a thin loculated fluid collection in the peritoneal  cavity for example on series 8 image 151 measuring 2 cm maximum  thickness. This does contain a small amount of air superiorly.     MUSCULOSKELETAL: Normal.                                                                      IMPRESSION:  1.  No evidence of pulmonary embolus.  2.  Loculated fluid collections in the pelvic cul-de-sac and the lower  abdomen may represent abscesses.  3.  Mild right hydronephrosis of uncertain etiology, possibly  secondary to the pelvic fluid collection.  4.  Small bilateral pleural effusions

## 2023-03-30 NOTE — PROGRESS NOTES
Care Suites Procedure Nursing Note    Patient Information  Name: Aicha Draper  Age: 37 year old    Procedure  Procedure: Abscess drain x 2 placement   Procedure start time: 0944  Procedure complete time: 1026  Concerns/abnormal assessment: None at this time.  If abnormal assessment, provider notified: N/A.    1026 Abscess Drain x 2: Pt tolerated well. VSS. Total sedation given - 50 mcg Fentanyl and 1 mg Versed. Posterior upper buttock drain placed w/o difficulty.  50 cc tan/thick fluid removed & specimen sent to lab. Right lower/mid abdominal drain placed w/o difficulty. 5 ml of tan colored fluid removed. Both drains connected to SYMONE bulb - full suction. Tubes secured with sutures and Stay Fix drsg applied to both drains.    1059 Pt back to ED rm 14 per cart & transport.    Gracie Apodaca RN

## 2023-03-30 NOTE — CONSULTS
OB CONSULT    HPI: Aicha Draper is a 37 year old  s/p pLTCS on 3/5/23 for failed induction of labor, failed vacuum.  She had been recovering well overall but developed drainage from her incision in the past week.  She developed copious purulent drainage on Monday and was seen in clinic and wound was packed.  She was started on Keflex as a precaution.  She was seen again yesterday for wound packing, at which time she reported progressively worsening leg swelling.  She was found to have significant lower extremity edema extending up to her upper abdomen.  She was sent over to the [facility] ED for evaluation.    Remarkable findings on evaluation:  Hgb 6.6 (was 8.1 on POD#1)  K 2.6  BNP 1102  CT C/A/P:  -- Bilateral pleural effusions, mild ground glass opacity in upper lungs  -- Loculated fluid collections in the pelvis that may represent abscess (8 x 7cm)    Patient has been admitted to the medicine service for further cardiac evaluation.  She does not have a hospital bed yet so remains in the ED presently.  She has been transfused 1u pRBCs and K has been repleted.  IR consulted and placed 2 pelvic drains this morning (one transgluteal and one anteriorly).  On Zosyn.    Uncomfortable with drains, particularly gluteal drain.  Wound continues to drain lots of purulent fluid.  Has decided to stop breastfeeding.      Patient Active Problem List   Diagnosis     CARDIOVASCULAR SCREENING; LDL GOAL LESS THAN 160     Insomnia     Migraine headache     Atypical squamous cells of undetermined significance (ASCUS) on Papanicolaou smear of cervix     Ganglion cyst of wrist, left     Weakness of both hips     Labor and delivery indication for care or intervention     S/P  section     Failed vacuum extraction delivery     Anemia due to blood loss, acute     Hypokalemia     Wound infection     S/P      Anemia, unspecified type     Acute congestive heart failure, unspecified heart failure type (H)            Past  Medical History:   Diagnosis Date     Acute congestive heart failure, unspecified heart failure type (H) 3/29/2023     Atypical squamous cells of undetermined significance (ASCUS) on Papanicolaou smear of cervix 2015    ASCUS cyto, repeat pap 1 yr     In vitro fertilization      PCOS (polycystic ovarian syndrome)        Past Surgical History:   Procedure Laterality Date      SECTION N/A 3/5/2023    Procedure:  section;  Surgeon: Guerda Hamm MD;  Location:  L+D     CHOLECYSTECTOMY, LAPOROSCOPIC  2010    Cholecystectomy, Laparoscopic     SURGICAL HISTORY OF -       STOMACH SURGERY REMOVE HAIRBALL     ZZC APPENDECTOMY      incidental with above       Social History     Tobacco Use     Smoking status: Never     Smokeless tobacco: Never   Substance Use Topics     Alcohol use: Not Currently     Comment: rarely     Drug use: No       Current Facility-Administered Medications   Medication     acetaminophen (TYLENOL) tablet 650 mg    Or     acetaminophen (TYLENOL) Suppository 650 mg     lidocaine (LMX4) cream     lidocaine 1 % 0.1-1 mL     melatonin tablet 1 mg     naloxone (NARCAN) injection 0.2 mg    Or     naloxone (NARCAN) injection 0.4 mg    Or     naloxone (NARCAN) injection 0.2 mg    Or     naloxone (NARCAN) injection 0.4 mg     ondansetron (ZOFRAN ODT) ODT tab 4 mg    Or     ondansetron (ZOFRAN) injection 4 mg     oxyCODONE (ROXICODONE) tablet 5 mg     piperacillin-tazobactam (ZOSYN) 3.375 g vial to attach to  mL bag     sodium chloride (PF) 0.9% PF flush 3 mL     sodium chloride (PF) 0.9% PF flush 3 mL     Current Outpatient Medications   Medication     acetaminophen (TYLENOL) 325 MG tablet     calcium carbonate (TUMS) 500 MG chewable tablet     cephALEXin (KEFLEX) 500 MG capsule     ferrous sulfate (FEROSUL) 325 (65 Fe) MG tablet     fish oil-omega-3 fatty acids 1000 MG capsule     furosemide (LASIX) 20 MG tablet     ibuprofen (ADVIL/MOTRIN) 800 MG tablet      omeprazole (PRILOSEC) 20 MG DR capsule     Prenatal Vit-Fe Fumarate-FA (PRENATAL MULTIVITAMIN W/IRON) 27-0.8 MG tablet     vitamin C (ASCORBIC ACID) 1000 MG TABS     vitamin D3 (CHOLECALCIFEROL) 50 mcg (2000 units) tablet       Allergies   Allergen Reactions     No Known Drug Allergies          Physical Exam    VS /71   Pulse 101   Temp 98.9  F (37.2  C) (Oral)   Resp 27   Wt 74.4 kg (164 lb)   SpO2 97%   BMI 28.15 kg/m      General: Awake, alert, appears slightly uncomfortable but NAD  Heart: Regular rate and rhythm  Lungs: Clear to auscultation, breathing unlabored  Abdomen: Soft, minimally tender.  Drains secure, draining purulent appearing fluid.  Incision: Draining copious yellow purulent fluid.  No surrounding erythema/induration.  Packing removed, wound is approximately 4cm long and approximately 2cm deep.  Flushed with saline and probed with a qtip.  Fascia feels intact, able to probe another 1cm medially.  Repacked with single dry 4x4 guaze.  As I finished dressing change, wound care arrived (I had placed consult order earlier) and removed packing so we could evaluate together.  She cleansed with Vashe cleanser and packed with Vashe soaked 4x4.   Extremities: Nontender, 2+ edema      ASSESSMENT/PLAN:  37 year old  s/p pLTCS on 3/5/23 readmitted with  wound infection/abscess and worsening lower extremity edema found to have severe anemia and pelvic fluid collection c/w abscess.  Now s/p IR drainage.    -- Appreciate IR drainage this morning, will look for recs on when appropriate to remove.  -- Wound care consult just completed for wound infection/abscess-- plan for twice daily dressing changes to be done by floor RN (see their consult note for details).  -- Continue Zosyn, ID consult pending.  -- No plans for surgical intervention at this time.  OK for general diet from OB perspective.  -- Patient plans to stop breastfeeding-- discussed tight bra/binder, cold packs, may need to  pump small amounts off to avoid engorgement.  Pump requested to bedside.    Total time: 60 minutes, 30 minutes at bedside performing wound care and counseling.    [facility] will continue to follow.  I am on call until 3/31 at 0730 at which time my colleague Dr. Cordova will take over.    Princess Guzman MD  Pager [phone]

## 2023-03-30 NOTE — OP NOTE
S/p image guided drain placement x2, without complication. Patient left the department in stable condition.

## 2023-03-30 NOTE — PROVIDER NOTIFICATION
MD Notification    Notified Person: MD    Notified Person Name:  Holger    Notification Date/Time: 03/30/23 4:15 PM    Notification Interaction: VocTwijector Messaging     Purpose of Notification: FYI Gram Stain of the R Pelvis Aspirate shows 4+ Gram positive cocci, 4+ Gram negative bacilli, 2+ WBC seen     Orders Received: Thank you, infectious disease will review, continue IV antibiotics    Comments:

## 2023-03-30 NOTE — PROVIDER NOTIFICATION
Request for add on CT guided drain placement. Reviewed labs, confirmed with bedside RN that patient has been NPO aside from 8 oz water with potassium replacement at 0830. Per Dr. Chanelle christian to proceed, no INR needed.

## 2023-03-30 NOTE — ED NOTES
DATE:  3/30/2023   TIME OF RECEIPT FROM LAB: 0100  LAB TEST:  potassium   LAB VALUE:  2.6  RESULTS GIVEN WITH READ-BACK TO (PROVIDER):  Hospitalist Captain  TIME LAB VALUE REPORTED TO PROVIDER:   0100

## 2023-03-30 NOTE — ED NOTES
RN assisted patient with several extremity movement exercises while she was in the bed. Patient was feeling sore from lack of movement. RN helped her changed positions in the bed and stretch each limb. Pt more comfortable. PO potassium given per replacement protocol for a K+ of 2.6. Patient remains on cardiac monitoring as she is waiting for an inpatient room.

## 2023-03-31 LAB
ANION GAP SERPL CALCULATED.3IONS-SCNC: 11 MMOL/L (ref 7–15)
BASOPHILS # BLD AUTO: 0.1 10E3/UL (ref 0–0.2)
BASOPHILS NFR BLD AUTO: 1 %
BUN SERPL-MCNC: 8.6 MG/DL (ref 6–20)
CALCIUM SERPL-MCNC: 8.3 MG/DL (ref 8.6–10)
CHLORIDE SERPL-SCNC: 102 MMOL/L (ref 98–107)
CREAT SERPL-MCNC: 0.81 MG/DL (ref 0.51–0.95)
DEPRECATED HCO3 PLAS-SCNC: 23 MMOL/L (ref 22–29)
EOSINOPHIL # BLD AUTO: 0.1 10E3/UL (ref 0–0.7)
EOSINOPHIL NFR BLD AUTO: 1 %
ERYTHROCYTE [DISTWIDTH] IN BLOOD BY AUTOMATED COUNT: 14.2 % (ref 10–15)
GFR SERPL CREATININE-BSD FRML MDRD: >90 ML/MIN/1.73M2
GLUCOSE SERPL-MCNC: 94 MG/DL (ref 70–99)
HCT VFR BLD AUTO: 25.8 % (ref 35–47)
HGB BLD-MCNC: 8.3 G/DL (ref 11.7–15.7)
IMM GRANULOCYTES # BLD: 0.5 10E3/UL
IMM GRANULOCYTES NFR BLD: 4 %
LYMPHOCYTES # BLD AUTO: 2.1 10E3/UL (ref 0.8–5.3)
LYMPHOCYTES NFR BLD AUTO: 19 %
MAGNESIUM SERPL-MCNC: 1.9 MG/DL (ref 1.7–2.3)
MCH RBC QN AUTO: 28.7 PG (ref 26.5–33)
MCHC RBC AUTO-ENTMCNC: 32.2 G/DL (ref 31.5–36.5)
MCV RBC AUTO: 89 FL (ref 78–100)
MONOCYTES # BLD AUTO: 1.1 10E3/UL (ref 0–1.3)
MONOCYTES NFR BLD AUTO: 10 %
NEUTROPHILS # BLD AUTO: 7.2 10E3/UL (ref 1.6–8.3)
NEUTROPHILS NFR BLD AUTO: 65 %
NRBC # BLD AUTO: 0 10E3/UL
NRBC BLD AUTO-RTO: 0 /100
PLATELET # BLD AUTO: 551 10E3/UL (ref 150–450)
POTASSIUM SERPL-SCNC: 3.5 MMOL/L (ref 3.4–5.3)
RBC # BLD AUTO: 2.89 10E6/UL (ref 3.8–5.2)
SODIUM SERPL-SCNC: 136 MMOL/L (ref 136–145)
WBC # BLD AUTO: 11 10E3/UL (ref 4–11)

## 2023-03-31 PROCEDURE — 80048 BASIC METABOLIC PNL TOTAL CA: CPT | Performed by: HOSPITALIST

## 2023-03-31 PROCEDURE — 250N000013 HC RX MED GY IP 250 OP 250 PS 637: Performed by: STUDENT IN AN ORGANIZED HEALTH CARE EDUCATION/TRAINING PROGRAM

## 2023-03-31 PROCEDURE — 250N000013 HC RX MED GY IP 250 OP 250 PS 637: Performed by: HOSPITALIST

## 2023-03-31 PROCEDURE — 36415 COLL VENOUS BLD VENIPUNCTURE: CPT | Performed by: HOSPITALIST

## 2023-03-31 PROCEDURE — 85025 COMPLETE CBC W/AUTO DIFF WBC: CPT | Performed by: HOSPITALIST

## 2023-03-31 PROCEDURE — 99232 SBSQ HOSP IP/OBS MODERATE 35: CPT | Performed by: HOSPITALIST

## 2023-03-31 PROCEDURE — 120N000001 HC R&B MED SURG/OB

## 2023-03-31 PROCEDURE — 99233 SBSQ HOSP IP/OBS HIGH 50: CPT | Performed by: SPECIALIST

## 2023-03-31 PROCEDURE — 83735 ASSAY OF MAGNESIUM: CPT | Performed by: HOSPITALIST

## 2023-03-31 PROCEDURE — 250N000011 HC RX IP 250 OP 636: Performed by: HOSPITALIST

## 2023-03-31 PROCEDURE — 250N000011 HC RX IP 250 OP 636

## 2023-03-31 PROCEDURE — 250N000011 HC RX IP 250 OP 636: Performed by: STUDENT IN AN ORGANIZED HEALTH CARE EDUCATION/TRAINING PROGRAM

## 2023-03-31 RX ORDER — POLYETHYLENE GLYCOL 3350 17 G/17G
17 POWDER, FOR SOLUTION ORAL DAILY PRN
Status: DISCONTINUED | OUTPATIENT
Start: 2023-03-31 | End: 2023-04-05 | Stop reason: HOSPADM

## 2023-03-31 RX ORDER — POLYETHYLENE GLYCOL 3350 17 G/17G
17 POWDER, FOR SOLUTION ORAL ONCE
Status: COMPLETED | OUTPATIENT
Start: 2023-03-31 | End: 2023-03-31

## 2023-03-31 RX ORDER — MAGNESIUM OXIDE 400 MG/1
400 TABLET ORAL DAILY
Status: DISCONTINUED | OUTPATIENT
Start: 2023-03-31 | End: 2023-04-05 | Stop reason: HOSPADM

## 2023-03-31 RX ORDER — HYDROMORPHONE HCL IN WATER/PF 6 MG/30 ML
0.2 PATIENT CONTROLLED ANALGESIA SYRINGE INTRAVENOUS EVERY 6 HOURS PRN
Status: DISCONTINUED | OUTPATIENT
Start: 2023-03-31 | End: 2023-04-05 | Stop reason: HOSPADM

## 2023-03-31 RX ORDER — AMOXICILLIN 250 MG
1 CAPSULE ORAL 2 TIMES DAILY
Status: DISCONTINUED | OUTPATIENT
Start: 2023-03-31 | End: 2023-04-05 | Stop reason: HOSPADM

## 2023-03-31 RX ORDER — HYDROMORPHONE HCL IN WATER/PF 6 MG/30 ML
0.4 PATIENT CONTROLLED ANALGESIA SYRINGE INTRAVENOUS EVERY 6 HOURS PRN
Status: DISCONTINUED | OUTPATIENT
Start: 2023-03-31 | End: 2023-04-05 | Stop reason: HOSPADM

## 2023-03-31 RX ORDER — POTASSIUM CHLORIDE 1500 MG/1
20 TABLET, EXTENDED RELEASE ORAL DAILY
Status: DISCONTINUED | OUTPATIENT
Start: 2023-03-31 | End: 2023-04-05 | Stop reason: HOSPADM

## 2023-03-31 RX ADMIN — SENNOSIDES AND DOCUSATE SODIUM 1 TABLET: 50; 8.6 TABLET ORAL at 08:44

## 2023-03-31 RX ADMIN — POLYETHYLENE GLYCOL 3350 17 G: 17 POWDER, FOR SOLUTION ORAL at 10:53

## 2023-03-31 RX ADMIN — OXYCODONE HYDROCHLORIDE 5 MG: 5 TABLET ORAL at 13:11

## 2023-03-31 RX ADMIN — SENNOSIDES AND DOCUSATE SODIUM 1 TABLET: 50; 8.6 TABLET ORAL at 20:37

## 2023-03-31 RX ADMIN — PANTOPRAZOLE SODIUM 40 MG: 40 TABLET, DELAYED RELEASE ORAL at 20:36

## 2023-03-31 RX ADMIN — ACETAMINOPHEN 650 MG: 325 TABLET, FILM COATED ORAL at 08:44

## 2023-03-31 RX ADMIN — PIPERACILLIN AND TAZOBACTAM 3.38 G: 3; .375 INJECTION, POWDER, FOR SOLUTION INTRAVENOUS at 03:30

## 2023-03-31 RX ADMIN — Medication 400 MG: at 16:20

## 2023-03-31 RX ADMIN — PIPERACILLIN AND TAZOBACTAM 3.38 G: 3; .375 INJECTION, POWDER, FOR SOLUTION INTRAVENOUS at 16:20

## 2023-03-31 RX ADMIN — PIPERACILLIN AND TAZOBACTAM 3.38 G: 3; .375 INJECTION, POWDER, FOR SOLUTION INTRAVENOUS at 21:52

## 2023-03-31 RX ADMIN — OXYCODONE HYDROCHLORIDE 5 MG: 5 TABLET ORAL at 00:32

## 2023-03-31 RX ADMIN — ACETAMINOPHEN 650 MG: 325 TABLET, FILM COATED ORAL at 16:28

## 2023-03-31 RX ADMIN — OXYCODONE HYDROCHLORIDE 5 MG: 5 TABLET ORAL at 08:44

## 2023-03-31 RX ADMIN — OXYCODONE HYDROCHLORIDE 5 MG: 5 TABLET ORAL at 19:23

## 2023-03-31 RX ADMIN — HYDROMORPHONE HYDROCHLORIDE 0.2 MG: 0.2 INJECTION, SOLUTION INTRAMUSCULAR; INTRAVENOUS; SUBCUTANEOUS at 23:06

## 2023-03-31 RX ADMIN — OXYCODONE HYDROCHLORIDE 5 MG: 5 TABLET ORAL at 04:37

## 2023-03-31 RX ADMIN — HYDROMORPHONE HYDROCHLORIDE 0.2 MG: 0.2 INJECTION, SOLUTION INTRAMUSCULAR; INTRAVENOUS; SUBCUTANEOUS at 16:19

## 2023-03-31 RX ADMIN — POTASSIUM CHLORIDE 20 MEQ: 1500 TABLET, EXTENDED RELEASE ORAL at 16:20

## 2023-03-31 RX ADMIN — HYDROMORPHONE HYDROCHLORIDE 0.4 MG: 0.2 INJECTION, SOLUTION INTRAMUSCULAR; INTRAVENOUS; SUBCUTANEOUS at 06:29

## 2023-03-31 RX ADMIN — PIPERACILLIN AND TAZOBACTAM 3.38 G: 3; .375 INJECTION, POWDER, FOR SOLUTION INTRAVENOUS at 08:45

## 2023-03-31 ASSESSMENT — ACTIVITIES OF DAILY LIVING (ADL)
ADLS_ACUITY_SCORE: 43
ADLS_ACUITY_SCORE: 37
ADLS_ACUITY_SCORE: 38
ADLS_ACUITY_SCORE: 44
ADLS_ACUITY_SCORE: 38
ADLS_ACUITY_SCORE: 44
ADLS_ACUITY_SCORE: 37
ADLS_ACUITY_SCORE: 44
ADLS_ACUITY_SCORE: 38
ADLS_ACUITY_SCORE: 44

## 2023-03-31 NOTE — PROGRESS NOTES
OB/GYN    Stopped in for a courtesy visit on Allyson. My partner, Dr. Cordova saw her this morning.  Patient feels like she is on the mend. All of her questions have been addressed.  She understands that her disposition will depend her infection status and also dressing changes.  Social work consult placed this morning to look into this.    Kamila Roland MD ....................  3/31/2023   12:43 PM , pager: [phone]

## 2023-03-31 NOTE — PLAN OF CARE
[id number]  VSS on RA ex tachy at times and febrile up to 102.5 this AM and 102 this balaji- tylenol given. Tele NSR. Oxy given q4-5h for incisional pain. Minimal breast tenderness, using pads. Better appetite. +1-2 BLE edema, improved per patient. No BM, miralax given and activity increased today. Educated patient on IS. Using purewick due to urgency. R SYMONE w/. Purulent drainage, L SYMONE w/o output. Wound cares completed with dilaudid as pre-med- still having moderate amount of purulent drainage. Up A1, gb, walker- up out of ebd in chair and walking today. Plan to monitor, will probably be here through the weekend.

## 2023-03-31 NOTE — PROGRESS NOTES
[facility]    Infectious Disease Progress Note    Date of Service : 03/31/2023     Assessment:  37 year old female who had a C section on 3/5 who has been hospitalized with a post operative surgical site infection and pelvic abscess in the cul de sac, also had mild right hydronephrosis . S/p drain placement, stain is polymicrobial cxs are pending     -Pelvic abscess following recent C section  -Severe hypokalemia  -Wide complex tachycardia   -Severe anemia  -Mild right hydronephrosis  -LE edema     Recommendations:  1. Follow cx data  2. Continue Zosyn, further antibiotic modification based on cx data  3. Short interval follow up Ct abdomen to ensure adequate drainage of abscesses  4. Follow clinical status and labs        Janette Hutchins MD    Interval History   Resting, had a fever of 102, anemia is better following transfusion, no more wide complex tachycardia after electrolytes were replenished. Feels ok otherwise. Ongoing purulent drainage from drains    Physical Exam   Temp: (!) 102.5  F (39.2  C) Temp src: Oral BP: (!) 143/80 Pulse: 96   Resp: 18 SpO2: 96 % O2 Device: None (Room air) Oxygen Delivery: 2 LPM  Vitals:    03/29/23 1156   Weight: 74.4 kg (164 lb)     Vital Signs with Ranges  Temp:  [98.9  F (37.2  C)-102.5  F (39.2  C)] 102.5  F (39.2  C)  Pulse:  [] 96  Resp:  [16-31] 18  BP: (113-145)/(56-83) 143/80  SpO2:  [94 %-100 %] 96 %    GENERAL APPEARANCE:  awake  EYES: Eyes grossly normal to inspection  RESP: lungs clear   CV: regular rates and rhythm  ABDOMEN: soft, surgical site with packing, 2 drains in place with purulent drainage  MS: trace edema  SKIN: no suspicious lesions or rashes    Other:    Medications       pantoprazole  40 mg Oral QPM     piperacillin-tazobactam  3.375 g Intravenous Q6H     polyethylene glycol  17 g Oral Once     senna-docusate  1 tablet Oral BID     sodium chloride (PF)  10 mL Irrigation Q8H     sodium chloride (PF)  10 mL Irrigation Q8H      sodium chloride (PF)  3 mL Intracatheter Q8H       Data   All microbiology laboratory data reviewed.  Recent Labs   Lab Test 23  0733 23  0709 23  2205 23  1204   WBC 11.0 8.3  --  7.3   HGB 8.3* 8.0* 7.9* 6.6*   HCT 25.8* 24.4*  --  21.2*   MCV 89 89  --  91   * 546*  --  571*     Recent Labs   Lab Test 23  0733 23  0709 23  1204   CR 0.81 0.76 0.78        Microbiology  3/30 pelvic aspirate  Pelvis, Right; Aspirate    0 Result Notes  Gram Stain Result   Abnormal   4+ Gram positive cocci      4+ Gram negative bacilli      2+ WBC seen           Imaging  3/29 Ct chest PE abdomen  CT CHEST PE ABDOMEN AND PELVIS WITH CONTRAST 3/29/2023 3:25 PM     CLINICAL HISTORY: SOB, PE,  wound infection.     TECHNIQUE: CT angiogram chest and routine CT abdomen pelvis with IV  contrast. Arterial phase through the chest and venous phase through  the abdomen and pelvis. 2D and 3D MIP reconstructions were preformed  by the CT technologist. Dose reduction techniques were used.      CONTRAST: 82mL Isovue-370     COMPARISON: None.     FINDINGS:  ANGIOGRAM CHEST: Pulmonary arteries are normal caliber and negative  for pulmonary emboli. Thoracic aorta is negative for dissection. No CT  evidence of right heart strain.      LUNGS AND PLEURA: Small bilateral pleural effusions. Adjacent  compressive atelectasis. Mild groundglass opacity in the upper lungs  may represent additional atelectasis or edema.     MEDIASTINUM/AXILLAE: No lymphadenopathy. No coronary artery  calcification.     HEPATOBILIARY: Cholecystectomy.     PANCREAS: Normal.     SPLEEN: Normal.     ADRENAL GLANDS: Normal.     KIDNEYS/BLADDER: Mild right hydronephrosis. The right ureter is  dilated into the renal pelvis. No obstructing stone.     BOWEL: Normal.     LYMPH NODES: Normal.     PELVIC ORGANS: Uterus is unremarkable.     OTHER: There is a fluid collection in the pelvic cul-de-sac measuring  8 x 7 cm. There is a thin  loculated fluid collection in the peritoneal  cavity for example on series 8 image 151 measuring 2 cm maximum  thickness. This does contain a small amount of air superiorly.     MUSCULOSKELETAL: Normal.                                                                      IMPRESSION:  1.  No evidence of pulmonary embolus.  2.  Loculated fluid collections in the pelvic cul-de-sac and the lower  abdomen may represent abscesses.  3.  Mild right hydronephrosis of uncertain etiology, possibly  secondary to the pelvic fluid collection.  4.  Small bilateral pleural effusions      Double Island Pedicle Flap Text: The defect edges were debeveled with a #15 scalpel blade.  Given the location of the defect, shape of the defect and the proximity to free margins a double island pedicle advancement flap was deemed most appropriate.  Using a sterile surgical marker, an appropriate advancement flap was drawn incorporating the defect, outlining the appropriate donor tissue and placing the expected incisions within the relaxed skin tension lines where possible.    The area thus outlined was incised deep to adipose tissue with a #15 scalpel blade.  The skin margins were undermined to an appropriate distance in all directions around the primary defect and laterally outward around the island pedicle utilizing iris scissors.  There was minimal undermining beneath the pedicle flap.

## 2023-03-31 NOTE — PROGRESS NOTES
OB Progress Note    S: Feeling better.  Feels like legs are much, much improved. Breasts are minimally uncomfortable.  Good appetite. Pain is controlled with current regimen.  Notes abdominal dressing changes hurt and she is concerned her  won't be able to do them because of this. No BM since admission.     O:  Patient Vitals for the past 24 hrs:   BP Temp Temp src Pulse Resp SpO2   03/31/23 0008 131/75 99.6  F (37.6  C) Oral 93 18 96 %   03/30/23 1741 -- 99.9  F (37.7  C) Oral -- -- --   03/30/23 1607 -- 99.9  F (37.7  C) Oral -- -- --   03/30/23 1519 128/72 (!) 101.3  F (38.5  C) Oral 94 18 --   03/30/23 1400 137/73 -- -- 92 23 94 %   03/30/23 1200 131/71 -- -- 101 27 97 %   03/30/23 1145 133/68 -- -- 83 (!) 31 97 %   03/30/23 1100 -- 98.9  F (37.2  C) Oral 79 20 100 %   03/30/23 1045 (!) 145/83 -- -- 82 -- 98 %   03/30/23 1030 139/72 -- -- 80 16 100 %   03/30/23 1025 125/61 -- -- 78 16 100 %   03/30/23 1020 124/63 -- -- 77 16 100 %   03/30/23 1015 113/63 -- -- 77 16 100 %   03/30/23 1010 128/64 -- -- 76 16 100 %   03/30/23 1005 120/58 -- -- 78 16 100 %   03/30/23 1000 127/56 -- -- 80 16 100 %   03/30/23 0955 139/74 -- -- 77 16 100 %   03/30/23 0950 135/71 -- -- 76 16 100 %   03/30/23 0945 (!) 141/71 -- -- 76 16 100 %   03/30/23 0940 (!) 141/76 -- -- 71 20 100 %     Intake/Output Summary (Last 24 hours) at 3/31/2023 0840  Last data filed at 3/31/2023 0638  Gross per 24 hour   Intake 980 ml   Output 1940 ml   Net -960 ml       Gen: AO, NAD  Abd: dressing recently changed, minimal discharge noted, moderately distended, diffusely tender  Ext: NT 2+ BLE edema    Results  Hgb 8.3 (admitted at 6.6 s/p 1u PRBC)  WBC 11.0  Cultures pending    A/P: 38yo  s/p PLTCS 3/5/23, now s/p IR drainage of pelvic abscess. Admission also notable for significant BLE edema and severe anemia.  --drains: managed per IR  --zosyn: awaiting sensitivities, ID involved  --wound care: would recommend looking into home care in  anticipation of discharge as pt states  won't likely be able to do it.  Also noted that we can do these in clinic during routine business hours.  --constipation: continue stool softener, would defer more aggressive bowel management to primary team  --mood: doing well. Reviewed risks of pp depression given situation and timing.  Discussed warning signs.     Total time: 40min, 20 min at bedside    Tennille Cordova MD on 3/31/2023 at 8:46 AM  [phone] (0730 3/21- 0730 4/1)

## 2023-03-31 NOTE — PLAN OF CARE
1900 - 0630    A&Ox4. VSS on RA. Afebrile. Tele - NSR. C/o 5/10 abdominal pain - managed with PRN oxy x3 and ice packs. Denies N/V. Regular diet. Up A2, not OOB this shift. L PIV x2 SL w/ intermittent abx. Abd wound changed this AM - CDI. L SYMONE drain with serosanguinous output, R SYMONE with purulent output - 10 mL flushes given q 8 hrs. Continent, purewick in place d/t pain. ID consulted. WOC and OB/Gyn following. Discharge pending improvement.

## 2023-03-31 NOTE — PROGRESS NOTES
[facility]    Medicine Progress Note - Hospitalist Service    Date of Admission:  3/29/2023    Assessment & Plan   Aicha Draper, 37 year old female, with past medical history of  3/5/23 admitted for anemia, lower extremity edema, and concerns of pelvic abscess on CT following surgery.    Pelvic abscess  Status post  3/5/2023  Anemia with acute blood loss, s/p surgery, s/p RBC transfusion  Status post  3/5/2023.  Healthy baby boy.  Hospital admission for abdominal pain, lower extremity edema, and CT abdomen concerning for pelvic abscess.  For details, see admission note.  Interventional radiology, gynecology, infectious disease consulted.    Feeling better overall.  Interventional radiology follow-up appreciated with regard to drain placement x2, CT-guided 3/30/2023.  Continue IV antibiotics, Zosyn, per recommendations of infectious disease.  Await cultures.  Labs from today reviewed, morning labs as ordered.  Gynecology follow-up appreciated, see note 3/31.  Pain controlled.  Monitor for opiate side effects.  Bowel regimen to avoid constipation.  Not opiates as able for pain control.  Regular diet.  Monitor labs.    Wide-complex tachycardia, 9 beats at 7 AM 3/30/2023  Hypokalemia    Ongoing telemetry, no recurrent episodes of wide-complex tachycardia, reviewed with nursing staff.  Self-limited 9 beat run on morning of admission, likely secondary to acute infection, severe anemia, and severe hypokalemia.  Magnesium normal.  Transthoracic echocardiogram (TTE) normal, see report.  Continue to monitor.  Consult cardiology if recurrent episodes of tachyarrhythmia or patient symptomatic.  Continue close observation.  Patient agrees with plan as outlined.  Continue to monitor hemoglobin and potassium.  Treat infection.    Potassium and magnesium supplementation ordered.    Lower extremity edema with hypoalbuminemia  Small, bilateral pleural effusions on admission  "chest x-ray    Low albumin on admission, 2.3 on 3/30/2023.  Suspect lower extremity edema related to hypoalbuminemia in the setting of recent pregnancy, and surgery.    Elevate of legs to reduce edema.  Consider Ace bandaging or compression stockings.  OB/Gyn to also review.  Lower extremity ultrasound on admission negative for DVT.  Monitor, follow-up with primary clinic provider.    Mild right hydronephroses    Mild hydronephrosis noted on admission CT scan, see report.  Admission creatinine satisfactory.  Monitor urine output and renal function including serum Cr.  OB/Gyn also monitoring in the setting of recent  surgery and pelvic infection.    Disposition     Estimated length of stay 48 to 72 hours pending cultures, pain control, need for IV antibiotics, and recommendations from infectious disease, interventional radiology, and OB/Gyn    Anticipated discharge to home with        Diet: Regular Diet Adult    DVT Prophylaxis: Pneumatic Compression Devices  Daniels Catheter: Not present  Lines: None     Cardiac Monitoring: ACTIVE order. Indication: Tachyarrhythmias, acute (48 hours)  Code Status: Full Code      Clinically Significant Risk Factors        # Hypokalemia: Lowest K = 2.6 mmol/L in last 2 days, will replace as needed       # Hypoalbuminemia: Lowest albumin = 2.2 g/dL at 3/29/2023 12:04 PM, will monitor as appropriate            # Overweight: Estimated body mass index is 28.15 kg/m  as calculated from the following:    Height as of 3/3/23: 1.626 m (5' 4\").    Weight as of this encounter: 74.4 kg (164 lb)., PRESENT ON ADMISSION         Disposition Plan      Expected Discharge Date: 2023                  Rick Wren MD  Hospitalist Service  [facility]  Securely message with [place] (more info)  Text page via [facility] Paging/Directory   ______________________________________________________________________    Interval History   Lying in bed, her father also " at the bedside.  Feeling much better overall.  Recent fever.  Pain controlled.  Drains in place.  Consultations with interventional radiology, infectious disease, and gynecology underway.    Physical Exam   Vital Signs: Temp: 100  F (37.8  C) Temp src: Oral BP: (!) 143/80 Pulse: 96   Resp: 16 SpO2: 96 % O2 Device: None (Room air)    Weight: 164 lbs 0 oz    Comfortable, lying in bed, talkative and in good spirits  Lungs clear  Heart regular rhythm, no rubs or gallops  Abdomen soft, without rebound or rigidity  Extremities with 1+ edema, stable  Mental status normal    Medical Decision Making             Data     I have personally reviewed the following data over the past 24 hrs:    11.0  \   8.3 (L)   / 551 (H)     136 102 8.6 /  94   3.5 23 0.81 \       Imaging results reviewed over the past 24 hrs:   No results found for this or any previous visit (from the past 24 hour(s)).

## 2023-03-31 NOTE — PROGRESS NOTES
Interventional Radiology Progress Note:  Inpatient at [facility]  Date: March 31, 2023     History: Aicha Draper is a 37 year old female who is s/p C section on 3/5/2023 admitted on 3/29/2023 with surgical site infection, severe anemia, and bilateral lower extremity edema.     She is s/p LLQ and Right posterior drain placement in CT 3/30/23 and is being seen in follow up today    Interval History: Feeling better today. Missing son but would rather not have him visit in the hospital as he may be exposed to infection.     Physical Exam:   Vitals: Temp:  [99.6  F (37.6  C)-102.5  F (39.2  C)] 100  F (37.8  C)  Pulse:  [93-96] 96  Resp:  [16-18] 16  BP: (128-143)/(72-80) 143/80  SpO2:  [96 %] 96 %    General:  Stable, pleasant. Pale, ill appearing in no acute distress.  Sitting up in a chair,  in the room  Neuro:  A&O x 4. Moves all extremities equally.  Resp:  Normal respirations on room air. Non labored breathing. Equal air entry B/L     LLQ Drain: Dressing is C/D/I.   -Tube was flushed with a total of 10 mL NS and aspirated what was flushed in without pain or leaking.   -Tube is draining purulent milky green foul smelling fluid.     R posterior Drain: Dressing is C/D/I.   -Tube was flushed with a total of 30 mL NS and aspirated what was flushed in plus ~ 20 mL extra purulent drainage without pain or leaking  -Tube is draining purulent milky green foul smelling fluid.    Outputs  R 3/30 110 mL; 3/31 70 mL so far today  L  3/30  40 mL;  3/31 35 mL so far today    Labs:  Recent Labs   Lab 03/31/23  0733 03/30/23  0709 03/29/23  2205 03/29/23  1204   HGB 8.3* 8.0* 7.9* 6.6*   WBC 11.0 8.3  --  7.3     Recent Labs   Lab 03/31/23  0733 03/30/23  0709 03/29/23  1204   CR 0.81 0.76 0.78      Recent Labs   Lab 03/30/23  0709 03/29/23  1204   PROTTOTAL 5.3* 5.3*   ALBUMIN 2.3* 2.2*   BILITOTAL 0.2 0.2   ALKPHOS 107* 120*   AST 21 15   ALT 14 14     Assessment: Clinically feeling better but still  fairly ill, good outputs from the drains  -Will need continued flushing and drainage until outputs decrease to < 10 mL a day    Plan: Continue with flushing, and monitoring outputs  -Antibiotics per OB or Hospitalist    Will follow     Total time spent on the date of the encounter is 25 minutes, including time spent counseling the patient, performing a medically appropriate evaluation, reviewing prior medical history, ordering medications and tests, documenting clinical information in the medical record, and communication of results.    Thanks [facility] Interventional Radiology CNP ([phone]) (phone [phone])

## 2023-04-01 PROBLEM — N73.9 PELVIC ABSCESS IN FEMALE: Status: ACTIVE | Noted: 2023-04-01

## 2023-04-01 LAB
ANION GAP SERPL CALCULATED.3IONS-SCNC: 13 MMOL/L (ref 7–15)
BACTERIA ASPIRATE CULT: ABNORMAL
BACTERIA ASPIRATE CULT: ABNORMAL
BASOPHILS # BLD MANUAL: 0 10E3/UL (ref 0–0.2)
BASOPHILS NFR BLD MANUAL: 0 %
BUN SERPL-MCNC: 6.3 MG/DL (ref 6–20)
CALCIUM SERPL-MCNC: 8.6 MG/DL (ref 8.6–10)
CHLORIDE SERPL-SCNC: 104 MMOL/L (ref 98–107)
CREAT SERPL-MCNC: 0.7 MG/DL (ref 0.51–0.95)
CRP SERPL-MCNC: 200.73 MG/L
DEPRECATED HCO3 PLAS-SCNC: 23 MMOL/L (ref 22–29)
EOSINOPHIL # BLD MANUAL: 0 10E3/UL (ref 0–0.7)
EOSINOPHIL NFR BLD MANUAL: 0 %
ERYTHROCYTE [DISTWIDTH] IN BLOOD BY AUTOMATED COUNT: 14 % (ref 10–15)
GFR SERPL CREATININE-BSD FRML MDRD: >90 ML/MIN/1.73M2
GLUCOSE SERPL-MCNC: 99 MG/DL (ref 70–99)
HCT VFR BLD AUTO: 25.2 % (ref 35–47)
HGB BLD-MCNC: 8.2 G/DL (ref 11.7–15.7)
LYMPHOCYTES # BLD MANUAL: 2 10E3/UL (ref 0.8–5.3)
LYMPHOCYTES NFR BLD MANUAL: 19 %
MAGNESIUM SERPL-MCNC: 2.1 MG/DL (ref 1.7–2.3)
MCH RBC QN AUTO: 29.1 PG (ref 26.5–33)
MCHC RBC AUTO-ENTMCNC: 32.5 G/DL (ref 31.5–36.5)
MCV RBC AUTO: 89 FL (ref 78–100)
METAMYELOCYTES # BLD MANUAL: 0.1 10E3/UL
METAMYELOCYTES NFR BLD MANUAL: 1 %
MONOCYTES # BLD MANUAL: 0.4 10E3/UL (ref 0–1.3)
MONOCYTES NFR BLD MANUAL: 4 %
MYELOCYTES # BLD MANUAL: 0.2 10E3/UL
MYELOCYTES NFR BLD MANUAL: 2 %
NEUTROPHILS # BLD MANUAL: 7.8 10E3/UL (ref 1.6–8.3)
NEUTROPHILS NFR BLD MANUAL: 74 %
PLAT MORPH BLD: ABNORMAL
PLATELET # BLD AUTO: 462 10E3/UL (ref 150–450)
POTASSIUM SERPL-SCNC: 4 MMOL/L (ref 3.4–5.3)
RBC # BLD AUTO: 2.82 10E6/UL (ref 3.8–5.2)
RBC MORPH BLD: ABNORMAL
SODIUM SERPL-SCNC: 140 MMOL/L (ref 136–145)
WBC # BLD AUTO: 10.5 10E3/UL (ref 4–11)

## 2023-04-01 PROCEDURE — 99232 SBSQ HOSP IP/OBS MODERATE 35: CPT | Performed by: HOSPITALIST

## 2023-04-01 PROCEDURE — 86140 C-REACTIVE PROTEIN: CPT | Performed by: HOSPITALIST

## 2023-04-01 PROCEDURE — 250N000013 HC RX MED GY IP 250 OP 250 PS 637: Performed by: SPECIALIST

## 2023-04-01 PROCEDURE — 250N000011 HC RX IP 250 OP 636: Performed by: STUDENT IN AN ORGANIZED HEALTH CARE EDUCATION/TRAINING PROGRAM

## 2023-04-01 PROCEDURE — 250N000011 HC RX IP 250 OP 636

## 2023-04-01 PROCEDURE — 250N000013 HC RX MED GY IP 250 OP 250 PS 637: Performed by: HOSPITALIST

## 2023-04-01 PROCEDURE — 36415 COLL VENOUS BLD VENIPUNCTURE: CPT | Performed by: HOSPITALIST

## 2023-04-01 PROCEDURE — 120N000001 HC R&B MED SURG/OB

## 2023-04-01 PROCEDURE — 85014 HEMATOCRIT: CPT | Performed by: HOSPITALIST

## 2023-04-01 PROCEDURE — 87040 BLOOD CULTURE FOR BACTERIA: CPT | Performed by: HOSPITALIST

## 2023-04-01 PROCEDURE — 250N000013 HC RX MED GY IP 250 OP 250 PS 637: Performed by: STUDENT IN AN ORGANIZED HEALTH CARE EDUCATION/TRAINING PROGRAM

## 2023-04-01 PROCEDURE — 85007 BL SMEAR W/DIFF WBC COUNT: CPT | Performed by: HOSPITALIST

## 2023-04-01 PROCEDURE — 83735 ASSAY OF MAGNESIUM: CPT | Performed by: HOSPITALIST

## 2023-04-01 PROCEDURE — 258N000003 HC RX IP 258 OP 636: Performed by: SPECIALIST

## 2023-04-01 PROCEDURE — 80048 BASIC METABOLIC PNL TOTAL CA: CPT | Performed by: HOSPITALIST

## 2023-04-01 PROCEDURE — 250N000011 HC RX IP 250 OP 636: Performed by: SPECIALIST

## 2023-04-01 RX ORDER — DIPHENHYDRAMINE HYDROCHLORIDE 50 MG/ML
50 INJECTION INTRAMUSCULAR; INTRAVENOUS
Status: DISCONTINUED | OUTPATIENT
Start: 2023-04-01 | End: 2023-04-04

## 2023-04-01 RX ORDER — METHYLPREDNISOLONE SODIUM SUCCINATE 125 MG/2ML
125 INJECTION, POWDER, LYOPHILIZED, FOR SOLUTION INTRAMUSCULAR; INTRAVENOUS
Status: DISCONTINUED | OUTPATIENT
Start: 2023-04-01 | End: 2023-04-04

## 2023-04-01 RX ORDER — FUROSEMIDE 20 MG
20 TABLET ORAL DAILY
Status: COMPLETED | OUTPATIENT
Start: 2023-04-01 | End: 2023-04-02

## 2023-04-01 RX ADMIN — ACETAMINOPHEN 650 MG: 325 TABLET, FILM COATED ORAL at 17:18

## 2023-04-01 RX ADMIN — HYDROMORPHONE HYDROCHLORIDE 0.2 MG: 0.2 INJECTION, SOLUTION INTRAMUSCULAR; INTRAVENOUS; SUBCUTANEOUS at 07:54

## 2023-04-01 RX ADMIN — Medication 400 MG: at 07:53

## 2023-04-01 RX ADMIN — SENNOSIDES AND DOCUSATE SODIUM 1 TABLET: 50; 8.6 TABLET ORAL at 07:54

## 2023-04-01 RX ADMIN — FUROSEMIDE 20 MG: 20 TABLET ORAL at 10:06

## 2023-04-01 RX ADMIN — OXYCODONE HYDROCHLORIDE 5 MG: 5 TABLET ORAL at 03:54

## 2023-04-01 RX ADMIN — PIPERACILLIN AND TAZOBACTAM 3.38 G: 3; .375 INJECTION, POWDER, FOR SOLUTION INTRAVENOUS at 10:06

## 2023-04-01 RX ADMIN — PIPERACILLIN AND TAZOBACTAM 3.38 G: 3; .375 INJECTION, POWDER, FOR SOLUTION INTRAVENOUS at 22:30

## 2023-04-01 RX ADMIN — SENNOSIDES AND DOCUSATE SODIUM 1 TABLET: 50; 8.6 TABLET ORAL at 20:07

## 2023-04-01 RX ADMIN — PIPERACILLIN AND TAZOBACTAM 3.38 G: 3; .375 INJECTION, POWDER, FOR SOLUTION INTRAVENOUS at 17:18

## 2023-04-01 RX ADMIN — POTASSIUM CHLORIDE 20 MEQ: 1500 TABLET, EXTENDED RELEASE ORAL at 07:53

## 2023-04-01 RX ADMIN — ACETAMINOPHEN 650 MG: 325 TABLET, FILM COATED ORAL at 03:54

## 2023-04-01 RX ADMIN — POLYETHYLENE GLYCOL 3350 17 G: 17 POWDER, FOR SOLUTION ORAL at 07:54

## 2023-04-01 RX ADMIN — PANTOPRAZOLE SODIUM 40 MG: 40 TABLET, DELAYED RELEASE ORAL at 20:07

## 2023-04-01 RX ADMIN — PIPERACILLIN AND TAZOBACTAM 3.38 G: 3; .375 INJECTION, POWDER, FOR SOLUTION INTRAVENOUS at 03:46

## 2023-04-01 RX ADMIN — OXYCODONE HYDROCHLORIDE 5 MG: 5 TABLET ORAL at 22:31

## 2023-04-01 RX ADMIN — ACETAMINOPHEN 650 MG: 325 TABLET, FILM COATED ORAL at 10:06

## 2023-04-01 RX ADMIN — IRON SUCROSE 200 MG: 20 INJECTION, SOLUTION INTRAVENOUS at 10:48

## 2023-04-01 ASSESSMENT — ACTIVITIES OF DAILY LIVING (ADL)
ADLS_ACUITY_SCORE: 43

## 2023-04-01 NOTE — PROVIDER NOTIFICATION
MD Notification    Notified Person: MD    Notified Person Name: Baron    Notification Date/Time: 04/01/23 5:30 AM    Notification Interaction: [place]    Purpose of Notification: [address], order to notify if temp >101.0. Tmax 101.7. Do you want repeat cultures w/ AM labs?     Orders Received:    Comments:

## 2023-04-01 NOTE — PROGRESS NOTES
[facility]    Medicine Progress Note - Hospitalist Service    Date of Admission:  3/29/2023    Assessment & Plan   Aicha Draper, 37 year old female, with past medical history of  3/5/23 admitted for anemia, lower extremity edema, and concerns of pelvic abscess on CT following surgery.    Pelvic abscess, s/p CT-guided drain placement x 2 on 3/30  Status post  3/5/2023  Anemia with acute blood loss, s/p surgery, s/p RBC transfusion  Status post  3/5/2023.  Healthy baby boy.  Hospital admission for abdominal pain, lower extremity edema, and CT abdomen concerning for pelvic abscess.  For details, see admission note.  Interventional radiology, gynecology, infectious disease consulted.    IV Zosyn, per ID, follow cultures    ID considering short interval follow-up CT abdomen to ensure drainage of abscess    Pain control, bowel regimen    AM labs as ordered with CBC, CRP, basic, magnesium    Ongoing follow-up of ID, OB/Gyn, and IR    Wide-complex tachycardia, 9 beats at 7 AM 3/30/2023  Hypokalemia  Self-limited, 9 beat run of wide-complex tachycardia morning of admission, likely secondary to acute infection, severe anemia, and severe hypokalemia.  No recurrence.  Normal transthoracic echocardiogram 3/30/2023, EF 60% - see report    Monitor, ongoing telemetry, no recurrent events    Daily potassium and magnesium supplementation, monitor labs    Cardiology consult if recurrent arrhythmia    Lower extremity edema with hypoalbuminemia  Small, bilateral pleural effusions on admission chest x-ray    Low albumin on admission, 2.3 on 3/30/2023.  Suspect lower extremity edema related to hypoalbuminemia in the setting of recent pregnancy, and surgery.    Elevate of legs to reduce edema.  Consider Ace bandaging or compression stockings.  OB/Gyn to also review.  Lower extremity ultrasound on admission negative for DVT.  Monitor, follow-up with primary clinic provider.    Mild  "right hydronephroses    Mild hydronephrosis noted on admission CT scan, see report.  Admission creatinine satisfactory.  Monitor urine output and renal function including serum Cr.  OB/Gyn also monitoring in the setting of recent  surgery and pelvic infection.    Disposition     Estimated length of stay 48 to 72 hours pending cultures, pain control, need for IV antibiotics, follow-up CT results, and recommendations from infectious disease, interventional radiology, and OB/Gyn    Anticipated discharge to home with [facility]    Hospital service will continue to follow daily and comanage with subspecialty services.       Diet: Regular Diet Adult    DVT Prophylaxis: Pneumatic Compression Devices  Daniels Catheter: Not present  Lines: None     Cardiac Monitoring: ACTIVE order. Indication: Tachyarrhythmias, acute (48 hours)  Code Status: Full Code      Clinically Significant Risk Factors              # Hypoalbuminemia: Lowest albumin = 2.2 g/dL at 3/29/2023 12:04 PM, will monitor as appropriate            # Overweight: Estimated body mass index is 28.15 kg/m  as calculated from the following:    Height as of 3/3/23: 1.626 m (5' 4\").    Weight as of this encounter: 74.4 kg (164 lb)., PRESENT ON ADMISSION         Disposition Plan      Expected Discharge Date: 2023                  Rick Wren MD  Hospitalist Service  [facility]  Securely message with [employer] (more info)  Text page via [email or web address] Paging/Directory   ______________________________________________________________________    Interval History   Feeling better overall, pain controlled, recent fever noted.  Lying in bed, her father near the bedside.  Appears stronger, talkative and in good spirits.    Physical Exam   Vital Signs: Temp: 99.1  F (37.3  C) Temp src: Oral BP: 132/73 Pulse: 87   Resp: 16 SpO2: 97 % O2 Device: None (Room air)    Weight: 164 lbs 0 oz    Pleasant and interactive, lying in bed, in no immediate " distress  Lungs clear with good inspiratory effort  Heart regular rhythm, no rubs or gallops  Abdomen soft, nontender, drains in place  Extremities 1+ edema, stable  Mental status normal    Medical Decision Making             Data     I have personally reviewed the following data over the past 24 hrs:    10.5  \   8.2 (L)   / 462 (H)     140 104 6.3 /  99   4.0 23 0.70 \       Procal: N/A CRP: 200.73 (H) Lactic Acid: N/A         Imaging results reviewed over the past 24 hrs:   No results found for this or any previous visit (from the past 24 hour(s)).

## 2023-04-01 NOTE — PLAN OF CARE
[id number]  Tele NSR. VSS on RA, tmax 101.4, tylenol given around the clock for pain. No oxycodone given this shift. PRN Miralax given, passing gas, but no BM yet. Ambulated in halls twice SBA. Pre-medicated for wound cares this AM but declined dilaudid prior to dressing change this afternoon. Still moderate purulent drainage from incision site. R SYMONE w/o output (flushed 30 ml with purulent output) and L SYMONE w/ 7 ml. Plan- IR mentioned repeat CT scan to assess drainage amount in near future.

## 2023-04-01 NOTE — PROGRESS NOTES
[facility]   Obstetrics Post-Op / Progress Note         Assessment and Plan:    Assessment:   HD#3  Wound abscess following C/S 3/5  Anemia       Doing well.  Pain well-controlled.      Plan:   Continue current cares  Venofer 200 mg x 2  Lasix 20 mg PO x 2  Plan discharge for after afebrile x 48 hours  Appreciate care from IR, Medicine and ID teams            Interval History:   Doing well.  Pain is controlled. Still febrile with T 101.7 at 0354 today  Legs less uncomfortable but still pitting edema. Had stopped nursing/pumping prior to admission, no significant engorgement or pain.           Significant Problems:      Patient Active Problem List   Diagnosis     CARDIOVASCULAR SCREENING; LDL GOAL LESS THAN 160     Insomnia     Migraine headache     Atypical squamous cells of undetermined significance (ASCUS) on Papanicolaou smear of cervix     Ganglion cyst of wrist, left     Weakness of both hips     Labor and delivery indication for care or intervention     S/P  section     Failed vacuum extraction delivery     Anemia due to blood loss, acute     Hypokalemia     Wound infection     S/P      Anemia, unspecified type     Acute congestive heart failure, unspecified heart failure type (H)             Review of Systems:    The patient denies any chest pain, shortness of breath, excessive pain, fever, chills, purulent drainage from the wound, nausea or vomiting.          Medications:   All medications related to the patient's surgery have been reviewed          Physical Exam:   All vitals stable  Blood pressure 131/73, pulse 88, temperature 98.3  F (36.8  C), temperature source Oral, resp. rate 16, weight 74.4 kg (164 lb), SpO2 98 %, unknown if currently breastfeeding.  Wound clean and dry with minimal or no drainage.  Surrounding skin with minimal erythema.          Data:   All laboratory data related to this surgery reviewed  Lab Results   Component Value Date    HGB 8.2 (L)  04/01/2023       DAMIEN CISNEROS MD

## 2023-04-01 NOTE — PROGRESS NOTES
INFECTIOUS DISEASE Progress Note  April 1, 2023  [id number]  Aicha DAVIDSON Draper    ANTIBIOTICS:  zosyn    SUBJECTIVE: Tm 101.7, notes reviewed, feels a lot better, some pus on dressing when packing changed, 2 JPs cloudy, no diarrhea, no vaginal drainage    OBJECTIVE:  /73   Pulse 87   Temp 99.1  F (37.3  C) (Oral)   Resp 16   Wt 74.4 kg (164 lb)   SpO2 97%   BMI 28.15 kg/m    Alert, O x 3, NAD  Lung CTA  RRR  abd soft, NT, ND  Lower pelvis mid incision packed, no erythema, NT  L REJI cloudy  R Reji cloudy    LAB Data:  WBC 10    MICROBIOLOGY:  BC NG  Abscess B.fragilis, gpc, gnb    IMAGING:    Attestation:  I have reviewed today's vital signs, notes, medications, labs and imaging.    ASSESSMENT:      RECOMMENDATION:      37 year old female who had a C section on 3/5 who has been hospitalized with a post operative surgical site infection and pelvic abscess in the cul de sac, also had mild right hydronephrosis . S/p drain placement, stain is polymicrobial cxs are + B.frag, gnb/gpc, still some fever, BC sent today, zosyn appropriate; overall she is feeling better, WBC 10, JPs with cloudy output        Recommendations:  1. Follow cx data, temps  2. Continue Zosyn, further antibiotic modification based on cx data  3. Short interval follow up Ct abdomen to ensure adequate drainage of abscesses  4. Follow clinical status and labs  5. F/u cx, drains, pack wound       LUCIUS SAMANIEGO M.D.  O:[phone]   B:[phone]

## 2023-04-01 NOTE — PLAN OF CARE
3432-0200  A&Ox4. VSS on RA ext febrile. T-max 101.7. PRN tylenol given. Tele: NSR. PRN oxycodone given x2 for abd pain. R PIV SL'd w/ int abx. 1+ edema to L arm, 2+ edema to BLE's. Extremities elevated on pillows. Purewick in place during HS due to frequency. L & R SYMONE drains w/ purulent output. Adb wound w/ purulent drainage. PRN dilaudid given prior to wound care. Dressing changed x3. Ax1 GB+W, didn't get OOB during shift. Regular diet. On Mg and K protocols. Repeat blood cultures order for AM draw. Discharge plan pending.

## 2023-04-02 LAB
ANION GAP SERPL CALCULATED.3IONS-SCNC: 10 MMOL/L (ref 7–15)
BACTERIA ABSC ANAEROBE+AEROBE CULT: ABNORMAL
BACTERIA ABSC ANAEROBE+AEROBE CULT: ABNORMAL
BACTERIA ASPIRATE CULT: ABNORMAL
BACTERIA ASPIRATE CULT: ABNORMAL
BASOPHILS # BLD AUTO: 0.1 10E3/UL (ref 0–0.2)
BASOPHILS NFR BLD AUTO: 1 %
BUN SERPL-MCNC: 6.1 MG/DL (ref 6–20)
CALCIUM SERPL-MCNC: 8.5 MG/DL (ref 8.6–10)
CHLORIDE SERPL-SCNC: 103 MMOL/L (ref 98–107)
CREAT SERPL-MCNC: 0.72 MG/DL (ref 0.51–0.95)
CRP SERPL-MCNC: 144.87 MG/L
DEPRECATED HCO3 PLAS-SCNC: 23 MMOL/L (ref 22–29)
EOSINOPHIL # BLD AUTO: 0.3 10E3/UL (ref 0–0.7)
EOSINOPHIL NFR BLD AUTO: 3 %
ERYTHROCYTE [DISTWIDTH] IN BLOOD BY AUTOMATED COUNT: 14 % (ref 10–15)
GFR SERPL CREATININE-BSD FRML MDRD: >90 ML/MIN/1.73M2
GLUCOSE SERPL-MCNC: 102 MG/DL (ref 70–99)
HCT VFR BLD AUTO: 25.3 % (ref 35–47)
HGB BLD-MCNC: 7.9 G/DL (ref 11.7–15.7)
IMM GRANULOCYTES # BLD: 0.3 10E3/UL
IMM GRANULOCYTES NFR BLD: 3 %
LYMPHOCYTES # BLD AUTO: 1.8 10E3/UL (ref 0.8–5.3)
LYMPHOCYTES NFR BLD AUTO: 18 %
MAGNESIUM SERPL-MCNC: 2.1 MG/DL (ref 1.7–2.3)
MCH RBC QN AUTO: 28.2 PG (ref 26.5–33)
MCHC RBC AUTO-ENTMCNC: 31.2 G/DL (ref 31.5–36.5)
MCV RBC AUTO: 90 FL (ref 78–100)
MONOCYTES # BLD AUTO: 0.9 10E3/UL (ref 0–1.3)
MONOCYTES NFR BLD AUTO: 9 %
NEUTROPHILS # BLD AUTO: 6.6 10E3/UL (ref 1.6–8.3)
NEUTROPHILS NFR BLD AUTO: 66 %
NRBC # BLD AUTO: 0 10E3/UL
NRBC BLD AUTO-RTO: 0 /100
PLATELET # BLD AUTO: 502 10E3/UL (ref 150–450)
POTASSIUM SERPL-SCNC: 4.2 MMOL/L (ref 3.4–5.3)
RBC # BLD AUTO: 2.8 10E6/UL (ref 3.8–5.2)
SODIUM SERPL-SCNC: 136 MMOL/L (ref 136–145)
WBC # BLD AUTO: 9.9 10E3/UL (ref 4–11)

## 2023-04-02 PROCEDURE — 36415 COLL VENOUS BLD VENIPUNCTURE: CPT | Performed by: HOSPITALIST

## 2023-04-02 PROCEDURE — 999N000128 HC STATISTIC PERIPHERAL IV START W/O US GUIDANCE

## 2023-04-02 PROCEDURE — 83735 ASSAY OF MAGNESIUM: CPT | Performed by: HOSPITALIST

## 2023-04-02 PROCEDURE — 258N000003 HC RX IP 258 OP 636: Performed by: SPECIALIST

## 2023-04-02 PROCEDURE — 250N000013 HC RX MED GY IP 250 OP 250 PS 637: Performed by: STUDENT IN AN ORGANIZED HEALTH CARE EDUCATION/TRAINING PROGRAM

## 2023-04-02 PROCEDURE — 250N000013 HC RX MED GY IP 250 OP 250 PS 637: Performed by: OBSTETRICS & GYNECOLOGY

## 2023-04-02 PROCEDURE — 250N000011 HC RX IP 250 OP 636: Performed by: SPECIALIST

## 2023-04-02 PROCEDURE — 99232 SBSQ HOSP IP/OBS MODERATE 35: CPT | Performed by: HOSPITALIST

## 2023-04-02 PROCEDURE — 85025 COMPLETE CBC W/AUTO DIFF WBC: CPT | Performed by: HOSPITALIST

## 2023-04-02 PROCEDURE — 120N000001 HC R&B MED SURG/OB

## 2023-04-02 PROCEDURE — 250N000011 HC RX IP 250 OP 636: Performed by: STUDENT IN AN ORGANIZED HEALTH CARE EDUCATION/TRAINING PROGRAM

## 2023-04-02 PROCEDURE — 86140 C-REACTIVE PROTEIN: CPT | Performed by: HOSPITALIST

## 2023-04-02 PROCEDURE — 250N000011 HC RX IP 250 OP 636: Performed by: HOSPITALIST

## 2023-04-02 PROCEDURE — 250N000013 HC RX MED GY IP 250 OP 250 PS 637: Performed by: HOSPITALIST

## 2023-04-02 PROCEDURE — 250N000013 HC RX MED GY IP 250 OP 250 PS 637: Performed by: SPECIALIST

## 2023-04-02 PROCEDURE — 80048 BASIC METABOLIC PNL TOTAL CA: CPT | Performed by: HOSPITALIST

## 2023-04-02 RX ORDER — BISACODYL 10 MG
10 SUPPOSITORY, RECTAL RECTAL DAILY PRN
Status: DISCONTINUED | OUTPATIENT
Start: 2023-04-02 | End: 2023-04-05 | Stop reason: HOSPADM

## 2023-04-02 RX ADMIN — IRON SUCROSE 200 MG: 20 INJECTION, SOLUTION INTRAVENOUS at 09:50

## 2023-04-02 RX ADMIN — PIPERACILLIN AND TAZOBACTAM 3.38 G: 3; .375 INJECTION, POWDER, FOR SOLUTION INTRAVENOUS at 11:07

## 2023-04-02 RX ADMIN — BISACODYL 10 MG: 10 SUPPOSITORY RECTAL at 12:12

## 2023-04-02 RX ADMIN — PIPERACILLIN AND TAZOBACTAM 3.38 G: 3; .375 INJECTION, POWDER, FOR SOLUTION INTRAVENOUS at 05:27

## 2023-04-02 RX ADMIN — FUROSEMIDE 20 MG: 20 TABLET ORAL at 08:46

## 2023-04-02 RX ADMIN — ACETAMINOPHEN 650 MG: 325 TABLET, FILM COATED ORAL at 03:02

## 2023-04-02 RX ADMIN — Medication 400 MG: at 08:46

## 2023-04-02 RX ADMIN — POTASSIUM CHLORIDE 20 MEQ: 1500 TABLET, EXTENDED RELEASE ORAL at 08:46

## 2023-04-02 RX ADMIN — ACETAMINOPHEN 650 MG: 325 TABLET, FILM COATED ORAL at 18:58

## 2023-04-02 RX ADMIN — HYDROMORPHONE HYDROCHLORIDE 0.4 MG: 0.2 INJECTION, SOLUTION INTRAMUSCULAR; INTRAVENOUS; SUBCUTANEOUS at 20:39

## 2023-04-02 RX ADMIN — Medication 1 MG: at 20:40

## 2023-04-02 RX ADMIN — PANTOPRAZOLE SODIUM 40 MG: 40 TABLET, DELAYED RELEASE ORAL at 20:40

## 2023-04-02 RX ADMIN — SENNOSIDES AND DOCUSATE SODIUM 1 TABLET: 50; 8.6 TABLET ORAL at 20:40

## 2023-04-02 RX ADMIN — PIPERACILLIN AND TAZOBACTAM 3.38 G: 3; .375 INJECTION, POWDER, FOR SOLUTION INTRAVENOUS at 18:25

## 2023-04-02 RX ADMIN — POLYETHYLENE GLYCOL 3350 17 G: 17 POWDER, FOR SOLUTION ORAL at 08:46

## 2023-04-02 RX ADMIN — SENNOSIDES AND DOCUSATE SODIUM 1 TABLET: 50; 8.6 TABLET ORAL at 08:46

## 2023-04-02 RX ADMIN — ACETAMINOPHEN 650 MG: 325 TABLET, FILM COATED ORAL at 08:46

## 2023-04-02 ASSESSMENT — ACTIVITIES OF DAILY LIVING (ADL)
ADLS_ACUITY_SCORE: 27
ADLS_ACUITY_SCORE: 43
ADLS_ACUITY_SCORE: 27
ADLS_ACUITY_SCORE: 43
ADLS_ACUITY_SCORE: 27
ADLS_ACUITY_SCORE: 27
ADLS_ACUITY_SCORE: 43
ADLS_ACUITY_SCORE: 27
ADLS_ACUITY_SCORE: 43
ADLS_ACUITY_SCORE: 43
ADLS_ACUITY_SCORE: 30
ADLS_ACUITY_SCORE: 26

## 2023-04-02 NOTE — PROGRESS NOTES
[facility]    Medicine Progress Note - Hospitalist Service    Date of Admission:  3/29/2023    Assessment & Plan   Aicha Draper, 37 year old female, with past medical history of  3/5/23 admitted for anemia, lower extremity edema, and concerns of pelvic abscess on CT following surgery.    Pelvic abscess, s/p CT-guided drain placement x 2 on 3/30  Status post  3/5/2023  Anemia with acute blood loss, s/p surgery, s/p RBC transfusion  Status post  3/5/2023.  Healthy baby boy.  Hospital admission for abdominal pain, lower extremity edema, and CT abdomen concerning for pelvic abscess.  For details, see admission note.  Interventional radiology, gynecology, infectious disease consulted.    Continue IV Zosyn, per ID, follow cultures    ID/IR considering short interval follow-up CT abdomen to ensure drainage of abscess, IR follow-up 4/3    Pain controlled, bowel regimen to avoid constipation    AM labs as ordered    Ongoing follow-up of ID, OB/Gyn, and IR    Wide-complex tachycardia, 9 beats at 7 AM 3/30/2023  Hypokalemia  Self-limited, 9 beat run of wide-complex tachycardia morning of admission, likely secondary to acute infection, severe anemia, and severe hypokalemia.  No recurrence.  Normal transthoracic echocardiogram 3/30/2023, EF 60% - see report    Monitor, recent telemetry reviewed and stable, no recurrent events    Daily potassium and magnesium supplementation, monitor labs    Cardiology consult if recurrent arrhythmia    Discontinued telemetry 4/2    Lower extremity edema with hypoalbuminemia  Small, bilateral pleural effusions on admission chest x-ray  Lower extremity ultrasound on admission negative for DVT.    Low albumin on admission, 2.3 on 3/30/2023.  Suspect lower extremity edema related to hypoalbuminemia in the setting of recent pregnancy, infection, and surgery.    Elevate of legs to reduce edema.  Ace bandaging ordered.    Continue to increase  "physical activity discussed and encouraged to reduce DVT risk.  Given severe anemia on admission with concerns of acute blood loss requiring red blood cell transfusion, avoiding enoxaparin/heparin at this time.  Reassess daily, other services to also review.    Mild right hydronephroses    Mild hydronephrosis noted on admission CT scan, see report.  Admission creatinine satisfactory.  Monitor urine output and renal function including serum Cr.  OB/Gyn also monitoring in the setting of recent  surgery and pelvic infection.    Disposition     Estimated length of stay 48 to 72 hours pending cultures, absence of fever, need for IV antibiotics, follow-up CT results, and recommendations from infectious disease, interventional radiology, and OB/Gyn    Anticipated discharge to home with [facility]    Hospital service will continue to follow and comanage with subspecialty services.       Diet: Regular Diet Adult    DVT Prophylaxis: Pneumatic Compression Devices  Daniels Catheter: Not present  Lines: None     Cardiac Monitoring: ACTIVE order. Indication: Tachyarrhythmias, acute (48 hours)  Code Status: Full Code      Clinically Significant Risk Factors              # Hypoalbuminemia: Lowest albumin = 2.2 g/dL at 3/29/2023 12:04 PM, will monitor as appropriate            # Overweight: Estimated body mass index is 28.15 kg/m  as calculated from the following:    Height as of 3/3/23: 1.626 m (5' 4\").    Weight as of this encounter: 74.4 kg (164 lb)., PRESENT ON ADMISSION         Disposition Plan      Expected Discharge Date: 2023                  Rick Wren MD  Hospitalist Service  [facility]  Securely message with [employer] (more info)  Text page via [facility] Paging/Directory   ______________________________________________________________________    Interval History   Peers stronger, feeling better each day, lying in bed, her father also near the bedside.  Fever curve appears to be " trending downward.  CRP also decreasing.  WBC normal.  Continues on IV Zosyn.    Physical Exam   Vital Signs: Temp: 99.1  F (37.3  C) Temp src: Oral BP: (!) 149/84 Pulse: 92   Resp: 16 SpO2: 95 % O2 Device: None (Room air)    Weight: 164 lbs 0 oz    Alert and awake, lying in bed, appearing comfortable in no immediate distress  Lungs clear  Heart regular rhythm, no rubs or gallops  Abdomen soft to palpation, drain in place  Extremities 1+ edema, stable, no calf tenderness or pain on palpation; physical activity discussed and encouraged; gentle Ace bandage imaging for swelling as tolerated  Mental status awake and alert, normal    Medical Decision Making             Data     I have personally reviewed the following data over the past 24 hrs:    9.9  \   7.9 (L)   / 502 (H)     136 103 6.1 /  102 (H)   4.2 23 0.72 \       Procal: N/A CRP: 144.87 (H) Lactic Acid: N/A         Imaging results reviewed over the past 24 hrs:   No results found for this or any previous visit (from the past 24 hour(s)).

## 2023-04-02 NOTE — PROGRESS NOTES
OB/GYN progress note    S: feeling much better. No BM yet.  Feels appetite is coming back. Walked x2 yesterday.     O:  Patient Vitals for the past 24 hrs:   BP Temp Temp src Pulse Resp SpO2   04/02/23 0733 139/84 99.1  F (37.3  C) Oral 82 16 95 %   04/02/23 0302 (!) 143/88 100.4  F (38  C) Oral 87 16 94 %   04/01/23 1936 138/79 (!) 100.8  F (38.2  C) Oral 87 16 98 %   04/01/23 1718 -- (!) 101.4  F (38.6  C) -- -- -- --   04/01/23 1559 136/76 (!) 100.8  F (38.2  C) Oral 94 16 95 %   04/01/23 1129 132/73 99.1  F (37.3  C) Oral 87 16 97 %   04/01/23 1108 (!) 141/75 98.6  F (37  C) Oral 89 16 96 %   04/01/23 1053 (!) 140/75 99.3  F (37.4  C) Oral 92 16 97 %       Intake/Output Summary (Last 24 hours) at 4/2/2023 0950  Last data filed at 4/2/2023 0736  Gross per 24 hour   Intake 80 ml   Output 3482 ml   Net -3402 ml     Gen: AO, NAD  Abd: soft, min diffuse tenderness, mild distension   Ext NT 2+ BLE    Results  Hgb 7.9 (8.2)  WBC 9.9    A/P: 36yo  HD#4 s/p PLTCS 3/5/23, now s/p IR drainage of pelvic abscess. Admission also notable for significant BLE edema and severe anemia.  --Anemia: s/p 1uPRBC, venofer x1, second infusing now  --consitpation: encouraged suppository.  Pt agrees.   --wound care: dressing changes less uncomfortable.  Had a temp yesterday so discharge is still a bit out.   --drains: mgmt per IR  --temp yesterday. Cultures still pending. On zosyn.    Appreciate cares from IR, Med & ID

## 2023-04-02 NOTE — PLAN OF CARE
VSS on RA, febrile 100.4 at 0300, Tylenol given. Tele-NSR. Oxy given once last night. Purewick in place. No BM, denies discomfort. Dressing to abdominal incision changed this morning.  SYMONE's patent, milky tan to R, milky tan/slight serousanguenous to L. Q8 flush to SYMONE's. Edema BLE, elevated. Continuing to monitor temps, labs, and SYMONE drainage.

## 2023-04-02 NOTE — PROGRESS NOTES
INFECTIOUS DISEASE Progress Note  April 1, 2023  [id number]  Aicha Draper    ANTIBIOTICS:  zosyn    SUBJECTIVE: Tm 101.4 last night, 100.4 today, now 99.4, notes reviewed, feels a lot better, some pus on dressing when packing changed, 2 JPs cloudy--had 80 mls out during night--still very purulent, no diarrhea, no vaginal drainage; wound  today; L SYMONE still very viscous, irrigating 10 mls tid    With drains 70 and 10 mls pus aspirated    OBJECTIVE:  /84 (BP Location: Left arm)   Pulse 82   Temp 99.1  F (37.3  C) (Oral)   Resp 16   Wt 74.4 kg (164 lb)   SpO2 95%   BMI 28.15 kg/m    Alert, O x 3, NAD  Lung CTA  abd soft, NT, ND  Lower pelvis mid incision packed, no erythema, NT  L SYMONE cloudy-very thick pus  R Symone cloudy purulent not as thick    LAB Data:  WBC 10    MICROBIOLOGY:  BC NG  Abscess B.fragilis, gpc, gnb; Finegoldia, Peptoniphilus, mixed anaerobic bacteria, anaerobic GNB    IMAGING:  CT 3/29-  IMPRESSION:  1.  No evidence of pulmonary embolus.  2.  Loculated fluid collections in the pelvic cul-de-sac and the lower  abdomen may represent abscesses.  3.  Mild right hydronephrosis of uncertain etiology, possibly  secondary to the pelvic fluid collection.  4.  Small bilateral pleural effusions.    Attestation:  I have reviewed today's vital signs, notes, medications, labs and imaging.    ASSESSMENT:      RECOMMENDATION:      37 year old female who had a C section on 3/5 who has been hospitalized with a post operative surgical site infection and pelvic abscess in the cul de sac, also had mild right hydronephrosis . S/p drain placement, stain is polymicrobial cxs are + B.FRAG,  Finegoldia, Peptoniphilus, mixed anaerobic bacteria, anaerobic GNB  , still some fever but improving, BC NG, zosyn appropriate; overall she is feeling better, WBC 10, JPs with purulent output--still quite a bit out ian on L, drainage very viscous, seems to be just anaerobic bacteria, suspect babies head contaminated when  at 10 cm for prolonged period with failure to progress then went to C section contaminating wound leading to infection. Clinically improving just taking long time, pus very viscous        Recommendations:  1. Follow cx data, temps  2. Continue Zosyn, further antibiotic modification based on cx data  3. Short interval follow up Ct abdomen to ensure adequate drainage of abscesses  4. Follow clinical status and labs  5. F/u cx, drains, pack wound  6. Cont to irrigate drains tid       LUCIUS SAMANIEGO M.D.  O:[phone]   B:[phone]

## 2023-04-03 ENCOUNTER — APPOINTMENT (OUTPATIENT)
Dept: CT IMAGING | Facility: CLINIC | Age: 38
End: 2023-04-03
Attending: SPECIALIST
Payer: COMMERCIAL

## 2023-04-03 ENCOUNTER — APPOINTMENT (OUTPATIENT)
Dept: INTERVENTIONAL RADIOLOGY/VASCULAR | Facility: CLINIC | Age: 38
End: 2023-04-03
Attending: NURSE PRACTITIONER
Payer: COMMERCIAL

## 2023-04-03 LAB
ABO/RH(D): NORMAL
ANION GAP SERPL CALCULATED.3IONS-SCNC: 12 MMOL/L (ref 7–15)
ANTIBODY SCREEN: NEGATIVE
BUN SERPL-MCNC: 5.9 MG/DL (ref 6–20)
CALCIUM SERPL-MCNC: 8.9 MG/DL (ref 8.6–10)
CHLORIDE SERPL-SCNC: 104 MMOL/L (ref 98–107)
CREAT SERPL-MCNC: 0.68 MG/DL (ref 0.51–0.95)
CRP SERPL-MCNC: 138.14 MG/L
DEPRECATED HCO3 PLAS-SCNC: 21 MMOL/L (ref 22–29)
GFR SERPL CREATININE-BSD FRML MDRD: >90 ML/MIN/1.73M2
GLUCOSE SERPL-MCNC: 104 MG/DL (ref 70–99)
HGB BLD-MCNC: 8 G/DL (ref 11.7–15.7)
MAGNESIUM SERPL-MCNC: 2.3 MG/DL (ref 1.7–2.3)
POTASSIUM SERPL-SCNC: 4.2 MMOL/L (ref 3.4–5.3)
SODIUM SERPL-SCNC: 137 MMOL/L (ref 136–145)
SPECIMEN EXPIRATION DATE: NORMAL

## 2023-04-03 PROCEDURE — 74177 CT ABD & PELVIS W/CONTRAST: CPT

## 2023-04-03 PROCEDURE — 99233 SBSQ HOSP IP/OBS HIGH 50: CPT | Performed by: SPECIALIST

## 2023-04-03 PROCEDURE — 0WPG30Z REMOVAL OF DRAINAGE DEVICE FROM PERITONEAL CAVITY, PERCUTANEOUS APPROACH: ICD-10-PCS | Performed by: RADIOLOGY

## 2023-04-03 PROCEDURE — 250N000009 HC RX 250: Performed by: RADIOLOGY

## 2023-04-03 PROCEDURE — 272N000116 HC CATH CR1

## 2023-04-03 PROCEDURE — 272N000500 HC NEEDLE CR2

## 2023-04-03 PROCEDURE — 250N000013 HC RX MED GY IP 250 OP 250 PS 637: Performed by: STUDENT IN AN ORGANIZED HEALTH CARE EDUCATION/TRAINING PROGRAM

## 2023-04-03 PROCEDURE — 49424 ASSESS CYST CONTRAST INJECT: CPT

## 2023-04-03 PROCEDURE — 120N000001 HC R&B MED SURG/OB

## 2023-04-03 PROCEDURE — 250N000011 HC RX IP 250 OP 636: Performed by: STUDENT IN AN ORGANIZED HEALTH CARE EDUCATION/TRAINING PROGRAM

## 2023-04-03 PROCEDURE — 75984 XRAY CONTROL CATHETER CHANGE: CPT

## 2023-04-03 PROCEDURE — 36415 COLL VENOUS BLD VENIPUNCTURE: CPT | Performed by: HOSPITALIST

## 2023-04-03 PROCEDURE — 250N000011 HC RX IP 250 OP 636: Performed by: SPECIALIST

## 2023-04-03 PROCEDURE — 86850 RBC ANTIBODY SCREEN: CPT | Performed by: HOSPITALIST

## 2023-04-03 PROCEDURE — 250N000011 HC RX IP 250 OP 636: Performed by: NURSE PRACTITIONER

## 2023-04-03 PROCEDURE — 86901 BLOOD TYPING SEROLOGIC RH(D): CPT | Performed by: HOSPITALIST

## 2023-04-03 PROCEDURE — 80048 BASIC METABOLIC PNL TOTAL CA: CPT | Performed by: HOSPITALIST

## 2023-04-03 PROCEDURE — 99232 SBSQ HOSP IP/OBS MODERATE 35: CPT | Performed by: HOSPITALIST

## 2023-04-03 PROCEDURE — 250N000011 HC RX IP 250 OP 636: Performed by: HOSPITALIST

## 2023-04-03 PROCEDURE — 0W9G30Z DRAINAGE OF PERITONEAL CAVITY WITH DRAINAGE DEVICE, PERCUTANEOUS APPROACH: ICD-10-PCS | Performed by: RADIOLOGY

## 2023-04-03 PROCEDURE — 250N000009 HC RX 250: Performed by: SPECIALIST

## 2023-04-03 PROCEDURE — G0463 HOSPITAL OUTPT CLINIC VISIT: HCPCS

## 2023-04-03 PROCEDURE — C1769 GUIDE WIRE: HCPCS

## 2023-04-03 PROCEDURE — 86140 C-REACTIVE PROTEIN: CPT | Performed by: HOSPITALIST

## 2023-04-03 PROCEDURE — 83735 ASSAY OF MAGNESIUM: CPT | Performed by: HOSPITALIST

## 2023-04-03 PROCEDURE — 49406 IMAGE CATH FLUID PERI/RETRO: CPT

## 2023-04-03 PROCEDURE — 85018 HEMOGLOBIN: CPT | Performed by: HOSPITALIST

## 2023-04-03 PROCEDURE — 99153 MOD SED SAME PHYS/QHP EA: CPT

## 2023-04-03 PROCEDURE — 250N000013 HC RX MED GY IP 250 OP 250 PS 637: Performed by: HOSPITALIST

## 2023-04-03 PROCEDURE — 99152 MOD SED SAME PHYS/QHP 5/>YRS: CPT

## 2023-04-03 PROCEDURE — 76080 X-RAY EXAM OF FISTULA: CPT | Mod: XS

## 2023-04-03 PROCEDURE — 0W2JX0Z CHANGE DRAINAGE DEVICE IN PELVIC CAVITY, EXTERNAL APPROACH: ICD-10-PCS | Performed by: RADIOLOGY

## 2023-04-03 RX ORDER — NALOXONE HYDROCHLORIDE 0.4 MG/ML
0.4 INJECTION, SOLUTION INTRAMUSCULAR; INTRAVENOUS; SUBCUTANEOUS
Status: DISCONTINUED | OUTPATIENT
Start: 2023-04-03 | End: 2023-04-03 | Stop reason: HOSPADM

## 2023-04-03 RX ORDER — NALOXONE HYDROCHLORIDE 0.4 MG/ML
0.2 INJECTION, SOLUTION INTRAMUSCULAR; INTRAVENOUS; SUBCUTANEOUS
Status: DISCONTINUED | OUTPATIENT
Start: 2023-04-03 | End: 2023-04-03 | Stop reason: HOSPADM

## 2023-04-03 RX ORDER — LIDOCAINE HYDROCHLORIDE 10 MG/ML
1-30 INJECTION, SOLUTION INFILTRATION; PERINEURAL
Status: COMPLETED | OUTPATIENT
Start: 2023-04-03 | End: 2023-04-03

## 2023-04-03 RX ORDER — FENTANYL CITRATE 50 UG/ML
25-50 INJECTION, SOLUTION INTRAMUSCULAR; INTRAVENOUS EVERY 5 MIN PRN
Status: DISCONTINUED | OUTPATIENT
Start: 2023-04-03 | End: 2023-04-03 | Stop reason: HOSPADM

## 2023-04-03 RX ORDER — IOPAMIDOL 755 MG/ML
82 INJECTION, SOLUTION INTRAVASCULAR ONCE
Status: COMPLETED | OUTPATIENT
Start: 2023-04-03 | End: 2023-04-03

## 2023-04-03 RX ADMIN — SENNOSIDES AND DOCUSATE SODIUM 1 TABLET: 50; 8.6 TABLET ORAL at 08:46

## 2023-04-03 RX ADMIN — ACETAMINOPHEN 650 MG: 325 TABLET, FILM COATED ORAL at 17:20

## 2023-04-03 RX ADMIN — FENTANYL CITRATE 25 MCG: 50 INJECTION, SOLUTION INTRAMUSCULAR; INTRAVENOUS at 14:06

## 2023-04-03 RX ADMIN — FENTANYL CITRATE 25 MCG: 50 INJECTION, SOLUTION INTRAMUSCULAR; INTRAVENOUS at 14:26

## 2023-04-03 RX ADMIN — POTASSIUM CHLORIDE 20 MEQ: 1500 TABLET, EXTENDED RELEASE ORAL at 08:46

## 2023-04-03 RX ADMIN — HYDROMORPHONE HYDROCHLORIDE 0.4 MG: 0.2 INJECTION, SOLUTION INTRAMUSCULAR; INTRAVENOUS; SUBCUTANEOUS at 21:51

## 2023-04-03 RX ADMIN — FENTANYL CITRATE 50 MCG: 50 INJECTION, SOLUTION INTRAMUSCULAR; INTRAVENOUS at 13:54

## 2023-04-03 RX ADMIN — ACETAMINOPHEN 650 MG: 325 TABLET, FILM COATED ORAL at 04:21

## 2023-04-03 RX ADMIN — FENTANYL CITRATE 25 MCG: 50 INJECTION, SOLUTION INTRAMUSCULAR; INTRAVENOUS at 13:59

## 2023-04-03 RX ADMIN — OXYCODONE HYDROCHLORIDE 5 MG: 5 TABLET ORAL at 23:54

## 2023-04-03 RX ADMIN — PIPERACILLIN AND TAZOBACTAM 3.38 G: 3; .375 INJECTION, POWDER, FOR SOLUTION INTRAVENOUS at 18:22

## 2023-04-03 RX ADMIN — PIPERACILLIN AND TAZOBACTAM 3.38 G: 3; .375 INJECTION, POWDER, FOR SOLUTION INTRAVENOUS at 23:55

## 2023-04-03 RX ADMIN — OXYCODONE HYDROCHLORIDE 5 MG: 5 TABLET ORAL at 19:54

## 2023-04-03 RX ADMIN — IOPAMIDOL 82 ML: 755 INJECTION, SOLUTION INTRAVENOUS at 11:00

## 2023-04-03 RX ADMIN — PIPERACILLIN AND TAZOBACTAM 3.38 G: 3; .375 INJECTION, POWDER, FOR SOLUTION INTRAVENOUS at 12:51

## 2023-04-03 RX ADMIN — LIDOCAINE HYDROCHLORIDE 25 ML: 10 INJECTION, SOLUTION INFILTRATION; PERINEURAL at 14:40

## 2023-04-03 RX ADMIN — SODIUM CHLORIDE 63 ML: 9 INJECTION, SOLUTION INTRAVENOUS at 11:01

## 2023-04-03 RX ADMIN — OXYCODONE HYDROCHLORIDE 5 MG: 5 TABLET ORAL at 15:46

## 2023-04-03 RX ADMIN — Medication 400 MG: at 08:46

## 2023-04-03 RX ADMIN — PIPERACILLIN AND TAZOBACTAM 3.38 G: 3; .375 INJECTION, POWDER, FOR SOLUTION INTRAVENOUS at 05:35

## 2023-04-03 RX ADMIN — PANTOPRAZOLE SODIUM 40 MG: 40 TABLET, DELAYED RELEASE ORAL at 19:54

## 2023-04-03 RX ADMIN — SENNOSIDES AND DOCUSATE SODIUM 1 TABLET: 50; 8.6 TABLET ORAL at 21:51

## 2023-04-03 RX ADMIN — ACETAMINOPHEN 650 MG: 325 TABLET, FILM COATED ORAL at 10:46

## 2023-04-03 ASSESSMENT — ACTIVITIES OF DAILY LIVING (ADL)
ADLS_ACUITY_SCORE: 26

## 2023-04-03 NOTE — IR NOTE
Interventional Radiology Intra-procedural Nursing Note    Patient Name: Aicha Draper  Medical Record Number: [id number]  Today's Date: April 3, 2023    Procedure: bilateral drain exchange, fentanyl only  Start time: 1354  End time: 1444  Report provided to: Marilee SELF  Patient depart time and location: 1455 to st88    Note: Patient entered Interventional Radiology Suite number 2 via cart. Patient awake, alert and oriented. Assisted onto procedural table - supine for abdominal drain, prone for buttock drain. position. Prepped and draped.  Dr. Wong in room. Time out and procedure started.     Procedure well tolerated by patient without complications. Procedure end with debrief by Dr. Wong.    Administered medication totals:  Lidocaine 1% 25 mL Intradermal  Fentanyl 125 mcg IVP

## 2023-04-03 NOTE — PLAN OF CARE
0700-1930H  A&Ox4. VSS. L PIV SL. On regular diet with good appetite. Ambulated this morning with family. Purewick in place.Tylenol and Oxy given for pain. Wound care on the suprapubic wound done by WOMANASA. SYMONE GIBSONQ (abd) and SYMONE CEDEÑO Buttocks in place, flushed and drained. CT scan of the abd done and Bilateral Abcess drain exchange done. Discharge pending overall improvement.

## 2023-04-03 NOTE — PLAN OF CARE
[id number]  VSS on RA, tmax 102.5. Tylenol given for pain/fever this balaji. Tele NSR, discontinued by MD. PRN miralax and suppository given with results. JPx2 still with purulent output, L>R. PW for urgency/incontinence- discouraged use today. Compression stockings on. Ambulated in ann 2 times, SBA. Plan to continue to monitor for fevers and awaiting plan for drains.

## 2023-04-03 NOTE — PROGRESS NOTES
[facility]    Infectious Disease Progress Note    Date of Service: 04/03/2023     Assessment:  37 year old female who had a C section on 3/5 who has been hospitalized with a post operative surgical site infection and pelvic abscess in the cul de sac, also had mild right hydronephrosis . S/p drain placement, stain is polymicrobial cxs are pending     -Pelvic abscess following recent C section  -Severe hypokalemia  -Wide complex tachycardia   -Severe anemia  -Mild right hydronephrosis  -LE edema     Recommendations  1. Repeat Ct abdomen today to reassess abscess  2. IR managing drains  3. If Ct appears stable , could transition to oral Augmentin and discharge from the ID stand point, treat for a total of 2 weeks until 4/12      Janette Hutchins MD    Interval History   Feeling better, eating , appetite improved, still has some abdominal discomfort, purulent output in left pelvic drain. Tolerating antibiotics without side effects    Physical Exam   Temp: 97.5  F (36.4  C) Temp src: Oral BP: 139/75 Pulse: 68   Resp: 16 SpO2: 97 % O2 Device: None (Room air)    Vitals:    03/29/23 1156   Weight: 74.4 kg (164 lb)     Vital Signs with Ranges  Temp:  [97.5  F (36.4  C)-102.5  F (39.2  C)] 97.5  F (36.4  C)  Pulse:  [] 68  Resp:  [16] 16  BP: (122-149)/(69-93) 139/75  SpO2:  [94 %-97 %] 97 %    GENERAL APPEARANCE:  awake  EYES: Eyes grossly normal to inspection  RESP:non labored  ABDOMEN: soft, surgical site with packing, left drain with purulent drainage, right drain with minimal serous output  MS: trace edema improved  SKIN: no suspicious lesions or rashes    Other:    Medications       magnesium oxide  400 mg Oral Daily     pantoprazole  40 mg Oral QPM     piperacillin-tazobactam  3.375 g Intravenous Q6H     potassium chloride  20 mEq Oral Daily     senna-docusate  1 tablet Oral BID     sodium chloride (PF)  10 mL Irrigation Q8H     sodium chloride (PF)  10 mL Irrigation Q8H     sodium chloride  (PF)  3 mL Intracatheter Q8H       Data   All microbiology laboratory data reviewed.  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     TECHNIQUE: CT angiogram chest and routine CT abdomen pelvis with IV  contrast. Arterial phase through the chest and venous phase through  the abdomen and pelvis. 2D and 3D MIP reconstructions were preformed  by the CT technologist. Dose reduction techniques were used.      CONTRAST: 82mL Isovue-370     COMPARISON: None.     FINDINGS:  ANGIOGRAM CHEST: Pulmonary arteries are normal caliber and negative  for pulmonary emboli. Thoracic aorta is negative for dissection. No CT  evidence of right heart strain.      LUNGS AND PLEURA: Small bilateral pleural effusions. Adjacent  compressive atelectasis. Mild groundglass opacity in the upper lungs  may represent additional atelectasis or edema.     MEDIASTINUM/AXILLAE: No lymphadenopathy. No coronary artery  calcification.     HEPATOBILIARY: Cholecystectomy.     PANCREAS: Normal.     SPLEEN: Normal.     ADRENAL GLANDS:  Normal.     KIDNEYS/BLADDER: Mild right hydronephrosis. The right ureter is  dilated into the renal pelvis. No obstructing stone.     BOWEL: Normal.     LYMPH NODES: Normal.     PELVIC ORGANS: Uterus is unremarkable.     OTHER: There is a fluid collection in the pelvic cul-de-sac measuring  8 x 7 cm. There is a thin loculated fluid collection in the peritoneal  cavity for example on series 8 image 151 measuring 2 cm maximum  thickness. This does contain a small amount of air superiorly.     MUSCULOSKELETAL: Normal.                                                                      IMPRESSION:  1.  No evidence of pulmonary embolus.  2.  Loculated fluid collections in the pelvic cul-de-sac and the lower  abdomen may represent abscesses.  3.  Mild right hydronephrosis of uncertain etiology, possibly  secondary to the pelvic fluid collection.  4.  Small bilateral pleural effusions

## 2023-04-03 NOTE — PROGRESS NOTES
[facility]  WO Nurse Inpatient Assessment     Consulted for: Suprapubic wound    Summary: Concern for pocketing infection at 12 O'clock. WO performed follow up 4/3 and large pocket of pus probed at 12 O'clock. Probed 3 times with about 20cc of yellow pus drained. Pt heading to CT for assessment.  OBGYN paged x2 (Guerda nuha Petersontit) with no return call by end of day will attempt to discuss tomorrow.     Patient History (according to provider note(s):      Aicha Draper is a 37 year old female who is s/p C section on 3/5/2023 admitted on 3/29/2023 with surgical site infection, severe anemia, and bilateral lower extremity edema.       Pelvic abscesses   Surgical site infection   Pt is s/p C section on 3/5/2023. She saw her OB on 3/27 after she noted that her C section incision opened up and started to ooze purulent material. She was started on Keflex at this time. She was seen in follow-up today and had persistent drainage from her incision as well as significant bilateral lower extremity swelling (see below) so was sent to the ED for further evaluation.   *On examination, she has marked purulent drainage coming from her surgical incision. There does not appear to be surrounding cellulitis of the skin.  * CT of the abdomen showed loculated fluid collections in the pelvic cul-de-sac and lower abdomen which may represent abscess (fluid collection measures 8 x 7 cm).   * Pt was given a dose of Ancef prior to CT results   * The patient is hemodynamically stable and non-septic appearing   - Admit to inpatient   - Will broaden to zosyn given the intra-abdominal fluid collections  - Blood cultures ordered (collected after receiving Ancef)   - Will obtain cultures of the purulent drainage   - IR consultation for consideration of percutaneous drainage   - OB/Gyn Consultation   - Consider ID consult for antibiotic guidance        Areas Assessed:      Areas visualized during today's visit:  "Abdomen    Wound location: suprapubic      Last photo: 4/3 Wound more open for photo        Wound due to: Surgical Wound  Wound history/plan of care: 2023 had low transverse  section. Pt was recovering well but developed drainage from her incision about a week ago. Was having copious purulent drainage in clinic on  Monday 3/27  and started on Keflex. Reassessment by WOC 4/3  Noted large pocket of pus drainage at 12 o'clock. Allan NELSON pt on her way to CT immediately after WOC visit 4/3  Wound base: 80 % red moist dermis, 20 % adipose tissue     Palpation of the wound bed: normal      Drainage: moderate     Description of drainage: serosanguinous     Measurements (length x width x depth, in cm): 0.1  x 2.5  x  2 cm linear open incision     Tunneling: at 12 O'clock did not fully probe as unable to see area, pus drainage noted probed 3x     Undermining: up to 2 cm from 3 to 9 O' Clock long incision line.  Periwound skin: Intact      Color: pink      Temperature: normal   Odor: none  Pain: mild, sharp and stinging especially around edges  Pain interventions prior to dressing change: soaking and slow and gentle cares , tolerated well  Treatment goal: Heal , Drainage control and Increase granulation  STATUS: evolving  Supplies ordered: at bedside and ordered 1\" nugauze       Treatment Plan:     Suprapubic wound: BID and PRN  1. Cleanse wound with Vashe #[id number]). Then gently dry out wound bed with dry gauze  2. Moisten packing gauze or kerlix with vashe, wring out excess so it is damp and gently pack into wound (this will remain in wound as primary dressing)  3. Apply Cavilon No Sting Skin Prep (#[id number]) to periwound and allow to dry.  4.  Cover wound with ABD and secure with Mesh underwear  5. Time and date dressing change      Orders: Reviewed    RECOMMEND PRIMARY TEAM ORDER: None, at this time  Education provided: plan of care, Infection prevention , Moisture management and " Hygiene  Discussed plan of care with: Patient, Nurse and Physician  WOC nurse follow-up plan: twice weekly  Notify WOC if wound(s) deteriorate.  Nursing to notify the Provider(s) and re-consult the WOC Nurse /if new skin concern.    DATA:     Current support surface: Standard  Standard gel/foam mattress (IsoFlex, Atmos air, etc)  Containment of urine/stool: Purewick external catheter   BMI: Body mass index is 28.15 kg/m .   Active diet order: Orders Placed This Encounter      Regular Diet Adult     Output: I/O last 3 completed shifts:  In: 40 [Other:40]  Out: 4483 [Urine:4450; Drains:33]     Labs:   Recent Labs   Lab 04/03/23  0748 04/02/23  0713 03/31/23  0733 03/30/23  0709   ALBUMIN  --   --   --  2.3*   HGB 8.0* 7.9*   < > 8.0*   WBC  --  9.9   < > 8.3    < > = values in this interval not displayed.     Pressure injury risk assessment:   Sensory Perception: 4-->no impairment  Moisture: 3-->occasionally moist  Activity: 3-->walks occasionally  Mobility: 3-->slightly limited  Nutrition: 3-->adequate  Friction and Shear: 3-->no apparent problem  Ibrahima Score: 19    Marek Andrade RN CWOCN  -Securely message with [employer] (more info) - can reach individually by name or search 'WOC Nurse' (Diana) to reach all current WOCs on duty.  WOC Office Phone: [phone]

## 2023-04-03 NOTE — PROGRESS NOTES
[facility]    Medicine Progress Note - Hospitalist Service    Date of Admission:  3/29/2023    Assessment & Plan   Aicha Draper, 37 year old female, with past medical history of  3/5/23 admitted for anemia, lower extremity edema, and concerns of pelvic abscess on CT following surgery.    Pelvic abscess, s/p CT-guided drain placement x 2 on 3/30  Status post  3/5/2023  Anemia with acute blood loss, s/p surgery, s/p RBC transfusion  Status post  3/5/2023.  Healthy baby boy.  Hospital admission for abdominal pain, lower extremity edema, and CT abdomen concerning for pelvic abscess.  For details, see admission note.  Interventional radiology, gynecology, infectious disease consulted.    Continue IV Zosyn, as per ID; tentative transition to oral Augmentin on future hospital discharge, see note 4/3; cultures reviewed    Short interval follow-up CT abdomen 4/3 with abscesses smaller than previous though not resolved, drainage catheters noted - see report; IR evaluating drain exchange/upsize/relocation, see IR note 4/3    Pain control; bowel regimen to avoid constipation    Ongoing dressing changes for pelvic infection; WOC consult follow-up     AM labs as ordered    Ongoing follow-up of ID, OB/Gyn, and IR; help appreciated    Wide-complex tachycardia, 9 beats at 7 AM 3/30/2023  Hypokalemia  Self-limited, 9 beat run of wide-complex tachycardia morning of admission, likely secondary to acute infection, severe anemia, and severe hypokalemia.  No recurrence.  No cardiopulmonary symptoms.    Normal transthoracic echocardiogram 3/30/2023, EF 60% - see report    Monitor, recent telemetry reviewed and stable, no recurrent events; telemetry discontinued 4/2    Daily potassium and magnesium supplementation, monitor labs    Cardiology consult if recurrent arrhythmia or symptoms, follow-up with primary clinic provider on discharge    Lower extremity edema with  hypoalbuminemia  Small, bilateral pleural effusions on admission chest x-ray  Lower extremity ultrasound on admission negative for DVT.    Low albumin on admission, 2.3 on 3/30/2023.  Suspect lower extremity edema related to hypoalbuminemia in the setting of recent pregnancy, infection, and surgery.    Elevate of legs to reduce edema, recently improving.  Ace bandaging ordered.    Continue to increase physical activity; discussed and encouraged activity to reduce DVT risk.  Given severe anemia on admission with concerns of acute blood loss requiring red blood cell transfusion, avoiding enoxaparin/heparin at this time for DVT prophylaxis.  Reassess daily, other consult services to also review.    Mild right hydronephroses    Mild hydronephrosis noted on admission CT scan, see report.  Admission creatinine satisfactory.  Monitor urine output and renal function including serum Cr.  OB/Gyn also monitoring in the setting of recent  surgery and pelvic infection.  Monitor, follow-up with primary clinic provider.    Disposition     Estimated length of stay 48 hours pending cultures, absence of fever, need for IV antibiotics, follow-up CT results 4/3 with drain adjustments, and recommendations from ID, IR, and OB/Gyn    Anticipated discharge to home with     Change in hospitalist provider tomorrow with one of my [facility] hospitalist colleagues assuming care.       Diet: Regular Diet Adult    DVT Prophylaxis: Pneumatic Compression Devices  Daniels Catheter: Not present  Lines: None     Cardiac Monitoring: None  Code Status: Full Code      Clinically Significant Risk Factors              # Hypoalbuminemia: Lowest albumin = 2.2 g/dL at 3/29/2023 12:04 PM, will monitor as appropriate                    Disposition Plan      Expected Discharge Date: 2023                  Rick Wren MD  Hospitalist Service  [facility]  Securely message with [employer] (more info)  Text  page via [facility] Paging/Directory   ______________________________________________________________________    Interval History   Comfortable, lying in bed, smiling, her father at the bedside.  Feeling better each day.  Pain controlled.  No chest pain, dyspnea, palpitations, or presyncope.    Physical Exam   Vital Signs: Temp: 97.5  F (36.4  C) Temp src: Oral BP: 129/70 Pulse: 81   Resp: 16 SpO2: 98 % O2 Device: None (Room air)    Weight: 164 lbs 0 oz    Lying in bed, pleasant and cooperative, appearing comfortable with family at the bedside  Lungs clear with good inspiratory effort  Heart regular rhythm, no rubs or gallops  Abdomen soft, nontender without rebound or rigidity  Extremities mild pedal edema, improved from admission  Mental status normal    Medical Decision Making             Data     I have personally reviewed the following data over the past 24 hrs:    N/A  \   8.0 (L)   / N/A     137 104 5.9 (L) /  104 (H)   4.2 21 (L) 0.68 \       Procal: N/A CRP: 138.14 (H) Lactic Acid: N/A         Imaging results reviewed over the past 24 hrs:   Recent Results (from the past 24 hour(s))   CT Abdomen Pelvis w Contrast    Narrative    CT ABDOMEN PELVIS WITH CONTRAST 4/3/2023 11:11 AM    CLINICAL HISTORY: Follow up pelvic abscess.    TECHNIQUE: CT scan of the abdomen and pelvis was performed following  injection of IV contrast. Multiplanar reformats were obtained. Dose  reduction techniques were used.  CONTRAST: 82mL Isovue-370    COMPARISON: 3/29/2023    FINDINGS:   LOWER CHEST: Small bilateral pleural effusions have decreased.    HEPATOBILIARY: Cholecystectomy.    PANCREAS: Normal.    SPLEEN: Normal.    ADRENAL GLANDS: Normal.    KIDNEYS/BLADDER: Persistent mild right hydroureteronephrosis. The  ureter is dilated into the pelvis.    BOWEL: Normal.    PELVIC ORGANS: Normal.    ADDITIONAL FINDINGS: Decreased size of an abscess in the pelvic  cul-de-sac, with new drainage catheter in good position. The abscess  measures  4.5 x 6.3 cm compared to previous 8 x 7 cm. A second, thin  lower abdominal fluid collection is also smaller than previous and  contains a drainage catheter in good position. This fluid collection  extends across midline bilaterally in the anterior peritoneal cavity,  measures 2 cm in maximum thickness. Both collections contain gas  bubbles likely due to the catheters.    MUSCULOSKELETAL: Normal.      Impression    IMPRESSION: New percutaneous drainage catheters in pelvic and lower  abdominal abscesses. The abscesses are smaller than previous although  have not resolved. Suggest continued drainage, versus consideration of  upsizing the catheters.

## 2023-04-03 NOTE — PLAN OF CARE
[id number]  AAOX4, Tele NSR. VSS on RA, tmax 100.1, tylenol given around the clock for pain. PRN Miralax given, passing gas, BM this shift, form brown stool, Ambulated to restroom and 100ft outside the room, no s/s of distress, gait steady with SBA. Tylenol po given after dressing changed, moderate purulent drainage from incision site, cleansed with wound wash, and packed with moist gauze, covered with 2 4x4 and medipore tape, pt tolerated well. Rt buttock SYMONE output (flushed 15ml with purulent output) and L SYMONE w/ 10ml. Plan- IR mentioned repeat CT scan to assess drainage amount in near future. Pt voiced she feels much better this shift.

## 2023-04-03 NOTE — PROGRESS NOTES
OBGYN  Progress note.    S. Feels better   Tolerating regular diet.   Still with linton catheter in place.   Feels legs are improving.  Wonders about how abdominal dressing will be cared for after discharge.   Less painful.  Still leaking.     O. /75 (BP Location: Left arm)   Pulse 68   Temp 97.5  F (36.4  C) (Oral)   Resp 16   Wt 74.4 kg (164 lb)   SpO2 97%   BMI 28.15 kg/m      Abdomen: soft, non tender.  Incision covered.  Surrounding skin with out erythema.   Ext: significantly less edematous.     A/P      S/P LSTC/S 3/5       HD # 5        S/P IR drainage of pelvic abscess.  Plan for re imaging today per ID.        Open incision - will need to have home health care for incision packing, if unavailable could come to OBGYN clinic on M-F for packing                 change.        Discharge per medicine.     Guerda Hamm MD  [phone] pager

## 2023-04-03 NOTE — PROGRESS NOTES
Patient is on IR schedule today Monday 4/3/23 for a Bilateral abcess drain exchange, upsize and/or reposition.     Aicha Draper is a 37 year old female who is s/p C section on   3/5/2023 admitted on 3/29/2023 with surgical site infection, severe anemia, and bilateral lower extremity edema.      She is s/p LLQ and Right posterior drain placement in CT 3/30/23 and had a CT scan done today which shows that the abscess' are smaller but there is still a significant amount of fluid present.     -Labs WNL for procedure.    -No NPO required.   -Phone procedural education reviewed with Aicha in detail including, but not limited to risks, benefits and alternatives, questions answered with understanding verbalized by her and consent is in IR.     Please contact the IR department at [phone] for procedural related questions.     Total time: 20 minutes    Thanks, Blanka [facility] Interventional Radiology CNP ([phone]) (phone [phone])

## 2023-04-04 LAB
BACTERIA BLD CULT: NO GROWTH
BACTERIA BLD CULT: NO GROWTH
CREAT SERPL-MCNC: 0.81 MG/DL (ref 0.51–0.95)
CRP SERPL-MCNC: 104.03 MG/L
GFR SERPL CREATININE-BSD FRML MDRD: >90 ML/MIN/1.73M2
HGB BLD-MCNC: 8.1 G/DL (ref 11.7–15.7)
MAGNESIUM SERPL-MCNC: 2.2 MG/DL (ref 1.7–2.3)
POTASSIUM SERPL-SCNC: 4.6 MMOL/L (ref 3.4–5.3)

## 2023-04-04 PROCEDURE — 85018 HEMOGLOBIN: CPT | Performed by: HOSPITALIST

## 2023-04-04 PROCEDURE — 999N000197 HC STATISTIC WOC PT EDUCATION, 0-15 MIN

## 2023-04-04 PROCEDURE — 999N000040 HC STATISTIC CONSULT NO CHARGE VASC ACCESS

## 2023-04-04 PROCEDURE — 250N000013 HC RX MED GY IP 250 OP 250 PS 637: Performed by: HOSPITALIST

## 2023-04-04 PROCEDURE — 36415 COLL VENOUS BLD VENIPUNCTURE: CPT | Performed by: HOSPITALIST

## 2023-04-04 PROCEDURE — 120N000001 HC R&B MED SURG/OB

## 2023-04-04 PROCEDURE — 84132 ASSAY OF SERUM POTASSIUM: CPT | Performed by: HOSPITALIST

## 2023-04-04 PROCEDURE — 250N000013 HC RX MED GY IP 250 OP 250 PS 637: Performed by: SPECIALIST

## 2023-04-04 PROCEDURE — 250N000011 HC RX IP 250 OP 636: Performed by: SPECIALIST

## 2023-04-04 PROCEDURE — 83735 ASSAY OF MAGNESIUM: CPT | Performed by: HOSPITALIST

## 2023-04-04 PROCEDURE — 250N000011 HC RX IP 250 OP 636: Performed by: HOSPITALIST

## 2023-04-04 PROCEDURE — 86140 C-REACTIVE PROTEIN: CPT | Performed by: HOSPITALIST

## 2023-04-04 PROCEDURE — 82565 ASSAY OF CREATININE: CPT | Performed by: HOSPITALIST

## 2023-04-04 PROCEDURE — 999N000128 HC STATISTIC PERIPHERAL IV START W/O US GUIDANCE

## 2023-04-04 PROCEDURE — 258N000003 HC RX IP 258 OP 636: Performed by: SPECIALIST

## 2023-04-04 PROCEDURE — 250N000013 HC RX MED GY IP 250 OP 250 PS 637: Performed by: STUDENT IN AN ORGANIZED HEALTH CARE EDUCATION/TRAINING PROGRAM

## 2023-04-04 PROCEDURE — 99232 SBSQ HOSP IP/OBS MODERATE 35: CPT | Performed by: INTERNAL MEDICINE

## 2023-04-04 PROCEDURE — 250N000011 HC RX IP 250 OP 636: Performed by: STUDENT IN AN ORGANIZED HEALTH CARE EDUCATION/TRAINING PROGRAM

## 2023-04-04 PROCEDURE — 99232 SBSQ HOSP IP/OBS MODERATE 35: CPT | Performed by: SPECIALIST

## 2023-04-04 RX ORDER — METHYLPREDNISOLONE SODIUM SUCCINATE 125 MG/2ML
125 INJECTION, POWDER, LYOPHILIZED, FOR SOLUTION INTRAMUSCULAR; INTRAVENOUS
Status: DISCONTINUED | OUTPATIENT
Start: 2023-04-04 | End: 2023-04-05 | Stop reason: HOSPADM

## 2023-04-04 RX ORDER — DIPHENHYDRAMINE HYDROCHLORIDE 50 MG/ML
50 INJECTION INTRAMUSCULAR; INTRAVENOUS
Status: DISCONTINUED | OUTPATIENT
Start: 2023-04-04 | End: 2023-04-05 | Stop reason: HOSPADM

## 2023-04-04 RX ADMIN — PIPERACILLIN AND TAZOBACTAM 3.38 G: 3; .375 INJECTION, POWDER, FOR SOLUTION INTRAVENOUS at 05:48

## 2023-04-04 RX ADMIN — OXYCODONE HYDROCHLORIDE 5 MG: 5 TABLET ORAL at 04:17

## 2023-04-04 RX ADMIN — HYDROMORPHONE HYDROCHLORIDE 0.4 MG: 0.2 INJECTION, SOLUTION INTRAMUSCULAR; INTRAVENOUS; SUBCUTANEOUS at 14:53

## 2023-04-04 RX ADMIN — POTASSIUM CHLORIDE 20 MEQ: 1500 TABLET, EXTENDED RELEASE ORAL at 09:04

## 2023-04-04 RX ADMIN — ACETAMINOPHEN 650 MG: 325 TABLET, FILM COATED ORAL at 17:36

## 2023-04-04 RX ADMIN — AMOXICILLIN AND CLAVULANATE POTASSIUM 1 TABLET: 875; 125 TABLET, FILM COATED ORAL at 20:49

## 2023-04-04 RX ADMIN — IRON SUCROSE 200 MG: 20 INJECTION, SOLUTION INTRAVENOUS at 11:54

## 2023-04-04 RX ADMIN — PANTOPRAZOLE SODIUM 40 MG: 40 TABLET, DELAYED RELEASE ORAL at 20:49

## 2023-04-04 RX ADMIN — ACETAMINOPHEN 650 MG: 325 TABLET, FILM COATED ORAL at 09:35

## 2023-04-04 RX ADMIN — AMOXICILLIN AND CLAVULANATE POTASSIUM 1 TABLET: 875; 125 TABLET, FILM COATED ORAL at 11:54

## 2023-04-04 RX ADMIN — Medication 400 MG: at 09:04

## 2023-04-04 RX ADMIN — SENNOSIDES AND DOCUSATE SODIUM 1 TABLET: 50; 8.6 TABLET ORAL at 20:49

## 2023-04-04 RX ADMIN — SENNOSIDES AND DOCUSATE SODIUM 1 TABLET: 50; 8.6 TABLET ORAL at 09:04

## 2023-04-04 ASSESSMENT — ACTIVITIES OF DAILY LIVING (ADL)
ADLS_ACUITY_SCORE: 23
ADLS_ACUITY_SCORE: 23
ADLS_ACUITY_SCORE: 22
ADLS_ACUITY_SCORE: 23
ADLS_ACUITY_SCORE: 23
ADLS_ACUITY_SCORE: 22
ADLS_ACUITY_SCORE: 22
ADLS_ACUITY_SCORE: 24
ADLS_ACUITY_SCORE: 22
ADLS_ACUITY_SCORE: 22
ADLS_ACUITY_SCORE: 23
ADLS_ACUITY_SCORE: 23

## 2023-04-04 NOTE — PROGRESS NOTES
[facility]    Infectious Disease Progress Note    Date of Service : 04/04/2023     Assessment:  37 year old female who had a C section on 3/5 who has been hospitalized with a post operative surgical site infection and pelvic abscess in the cul de sac, also had mild right hydronephrosis . S/p drain placement, cxs are polymicrobial. Drains up sized yesterday. Overall improving.     -Pelvic abscess following recent C section. S/p IR drainage  -Severe hypokalemia  -Wide complex tachycardia   -Severe anemia  -Mild right hydronephrosis  -LE edema     Recommendations  1. Ct findings and IR intervention noted, abscesses are draining adequately  2. Can transition to Augmentin from the ID stand point, antibiotic treatment for a total of 2 weeks until 4/12  3. Drain management per IR, will need further drain check and removal per IR.    Can discharge from the ID stand point  ID will sign off, please call with further questions as needed  Janette Hutchins MD    Interval History   Feels better, drains were upsized, both drains are draining, no abdominal pain or distention, tolerating antibiotics without side effects    Physical Exam   Temp: 98.8  F (37.1  C) Temp src: Oral BP: 135/80 Pulse: 80   Resp: 18 SpO2: 94 % O2 Device: None (Room air)    Vitals:    03/29/23 1156   Weight: 74.4 kg (164 lb)     Vital Signs with Ranges  Temp:  [98.3  F (36.8  C)-99.4  F (37.4  C)] 98.8  F (37.1  C)  Pulse:  [77-88] 80  Resp:  [18-20] 18  BP: (111-135)/(69-80) 135/80  SpO2:  [94 %-98 %] 94 %    Constitutional: Awake, alert, cooperative, no apparent distress  Lungs: non labored breathing  Abdomen: Right drain with milky fluid, left drain with minimal serous fluid  Skin: No rashes, no cyanosis, no edema    Other:    Medications       magnesium oxide  400 mg Oral Daily     pantoprazole  40 mg Oral QPM     piperacillin-tazobactam  3.375 g Intravenous Q6H     potassium chloride  20 mEq Oral Daily     senna-docusate  1 tablet  Oral BID     sodium chloride (PF)  10 mL Irrigation Q8H     sodium chloride (PF)  10 mL Irrigation Q8H     sodium chloride (PF)  10 mL Irrigation Q8H     sodium chloride (PF)  3 mL Intracatheter Q8H       Data   All microbiology laboratory data reviewed.  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     TECHNIQUE: CT scan of the abdomen and pelvis was performed following  injection of IV contrast. Multiplanar reformats were obtained. Dose  reduction techniques were used.  CONTRAST: 82mL Isovue-370     COMPARISON: 3/29/2023     FINDINGS:   LOWER CHEST: Small bilateral pleural effusions have decreased.     HEPATOBILIARY: Cholecystectomy.     PANCREAS: Normal.     SPLEEN: Normal.     ADRENAL GLANDS: Normal.     KIDNEYS/BLADDER: Persistent mild right hydroureteronephrosis. The  ureter is dilated into the pelvis.     BOWEL: Normal.     PELVIC ORGANS: Normal.     ADDITIONAL FINDINGS: Decreased size of an abscess in the pelvic  cul-de-sac, with new drainage catheter in good  position. The abscess  measures 4.5 x 6.3 cm compared to previous 8 x 7 cm. A second, thin  lower abdominal fluid collection is also smaller than previous and  contains a drainage catheter in good position. This fluid collection  extends across midline bilaterally in the anterior peritoneal cavity,  measures 2 cm in maximum thickness. Both collections contain gas  bubbles likely due to the catheters.     MUSCULOSKELETAL: Normal.                                                                      IMPRESSION: New percutaneous drainage catheters in pelvic and lower  abdominal abscesses. The abscesses are smaller than previous although  have not resolved. Suggest continued drainage, versus consideration of  upsizing the catheters.

## 2023-04-04 NOTE — PROGRESS NOTES
[facility]    Medicine Progress Note - Hospitalist Service    Date of Admission:  3/29/2023    Assessment & Plan      Aicha Draper is a 37 year old female who underwent  on 3/5/23 and now presented on 3/29/2023 with abdominal pain due to pelvic abscess and wound infection.        Pelvic and abdominal abscesses 8 x 7 cm, s/p CT-guided drain placement x 2 on 3/30 and drain exchange/repositioning upsize on 4/3  Surgical site infection  Status post  3/5/2023  - presented with abdominal pain, lower extremity edema  - CT showed pelvic abscesses measuring 8 x 7 cm and abdominal abscess    - S/p CT-guided drain placement into pelvic and abdominal abscess by IR on 3/30  - Follow-up CT scan on 4/3 showed abscesses smaller than before but not resolved  - On 4/3 underwent abscess drain check that showed large persistent left lower quadrant abscess crossing midline.  A new 14 Turkmen pigtail catheter was placed through existing access site using ultrasound guidance.  Left lower quadrant injection showed persistent abscess cavity in pelvis.  This was successfully upsized to 12 Turkmen pigtail drain.  -Increased drain output noted following procedure.  At 150 mL output from left anterior abdominal drain and 6 mm from posterior buttock drain on 4/3  -Abscess culture growing bacteroides fragilis and peptniphilus asaccharolyticus   -Blood cultures negative  -We will discharge with drains in place  -Infectious disease following.  Was initially on IV Zosyn, now transition to Augmentin.  Total duration of treatment is 2 weeks, continue antibiotics until   -Drain management per IR  - Pain control; bowel regimen to avoid constipation  -WOC following.  Continue packing and dressing changes per WOC    Anemia with acute blood loss, s/p surgery, s/p PRBC transfusion  -Hemoglobin 6.6 on admission on 3/29.  Was 8 on 3/6 following   -Blood loss likely due to recent , menstrual  blood loss following delivery  -S/p PRBC transfusion  -Hemoglobin improved to 8 and has been stable for past several days.   -Receiving Venofer     Wide-complex tachycardia, 9 beats at 7 AM 3/30/2023  Hypokalemia  Self-limited, 9 beat run of wide-complex tachycardia morning of admission, likely secondary to acute infection, severe anemia, and severe hypokalemia.  No recurrence.  No cardiopulmonary symptoms.    Normal transthoracic echocardiogram 3/30/2023, EF 60% - see report    Monitor, recent telemetry reviewed and stable, no recurrent events; telemetry discontinued 4/2    Daily potassium and magnesium supplementation, monitor labs    Cardiology consult if recurrent arrhythmia or symptoms, follow-up with primary clinic provider on discharge     Lower extremity edema with hypoalbuminemia  Small, bilateral pleural effusions on admission chest x-ray  Lower extremity ultrasound on admission negative for DVT.    Low albumin on admission, 2.3 on 3/30/2023.  Suspect lower extremity edema related to hypoalbuminemia in the setting of recent pregnancy, infection, and surgery.    Elevate of legs to reduce edema, recently improving.  Ace bandaging ordered.    Continue to increase physical activity; discussed and encouraged activity to reduce DVT risk.  Given severe anemia on admission with concerns of acute blood loss requiring red blood cell transfusion, avoiding enoxaparin/heparin at this time for DVT prophylaxis.  Reassess daily, other consult services to also review.     Mild right hydronephroses    Mild hydronephrosis noted on admission CT scan, see report.  Admission creatinine satisfactory.  Monitor urine output and renal function including serum Cr.  OB/Gyn also monitoring in the setting of recent  surgery and pelvic infection.  Monitor, follow-up with primary clinic provider.     Disposition     Anticipated discharge to home with        Diet: Regular Diet Adult    DVT Prophylaxis: Pneumatic  Compression Devices  Daniels Catheter: Not present  Lines: None     Cardiac Monitoring: None  Code Status: Full Code      Clinically Significant Risk Factors              # Hypoalbuminemia: Lowest albumin = 2.2 g/dL at 3/29/2023 12:04 PM, will monitor as appropriate                   Disposition Plan      Expected Discharge Date: 04/05/2023                  Manasa Wild MD  Hospitalist Service  [facility]  Securely message with [employer] (more info)  Text page via [employer] Paging/Directory   ______________________________________________________________________    Interval History   Patient reports she feels better today.  Drain output has improved though not significant output from buttock drain.  Afebrile, vital signs are stable    Physical Exam   Vital Signs: Temp: 98.8  F (37.1  C) Temp src: Oral BP: 135/80 Pulse: 80   Resp: 18 SpO2: 94 % O2 Device: None (Room air)    Weight: 164 lbs 0 oz    Constitutional - awake and alert, resting in bed, in no acute distress  Cardiovascular - regular rate and rhythm, no murmurs, no edema  Pulmonary - lungs are clear to auscultation bilaterally, no wheezing or rhonchi  Neurological - awake, alert and oriented x3.  Moving all 4 extremities, normal speech, no focal deficits    Medical Decision Making       ------------------ MEDICAL DECISION MAKING ------------------------------------------------------------------------------------------------------  Medical complexity over the past 24 hours:  -------------------------- MODERATE RISK FOR MORBIDITY --------------------------------------------------      Data     I have personally reviewed the following data over the past 24 hrs:    N/A  \   8.1 (L)   / N/A     N/A N/A N/A /  N/A   4.6 N/A 0.81 \       Procal: N/A CRP: 104.03 (H) Lactic Acid: N/A         Imaging results reviewed over the past 24 hrs:   No results found for this or any previous visit (from the past 24 hour(s)).

## 2023-04-04 NOTE — PROGRESS NOTES
Interventional Radiology Progress Note:  Inpatient at [facility]  Date: April 4, 2023     History: Aicha Draper is a 37 year old female who is s/p C section on 3/5/2023 admitted on 3/29/2023 with surgical site infection, severe anemia, and bilateral lower extremity edema.      She is s/p LLQ and Right posterior drain placement in CT 3/30/23. Drain exchange, reposition and upsize was done in IR 4/3. Allyson is being seen in follow up today     Interval History: Feeling much better today. Drains were sore after exchange and upsize yesterday but better today.      Physical Exam:   Temp:  [98.3  F (36.8  C)-99.4  F (37.4  C)] 98.8  F (37.1  C)  Pulse:  [77-88] 80  Resp:  [18-20] 18  BP: (111-135)/(69-80) 135/80  SpO2:  [94 %-98 %] 94 %    General:  Stable, pleasant. Better color, in no acute distress. Lying in the bed. Father in the room today  Neuro:  A&O x 4. Moves all extremities equally.  Resp:  Normal respirations on room air. Non labored breathing. Equal air entry B/L      LLQ Drain: Dressing is C/D/I.   -Tube was flushed with a total of 20 mL NS and aspirated what was flushed in without pain or leaking.   -Tube is draining purulent milky fluid.      R posterior Drain: Dressing is C/D/I.   -Tube was flushed with a total of 20 mL NS and aspirated what was flushed in plus without pain or leaking  -Tube is draining purulent milky fluid.     Outputs  R Posterior 3/30 110 mL; 3/31 100 mL; 4/1 30 mL;4/2 10 mL; 4/3 15 mL; 4/4 10 mL so far today  L  Anterior 3/30  40 mL;  3/31 45 mL;  4/1 35 mL; 4/2 35 mL; 4/3 105 mL; 4/4 165 mL so far today    Labs:  Recent Labs   Lab 04/04/23  0741 04/03/23  0748 04/02/23  0713 04/01/23  0755 03/31/23  0733   HGB 8.1* 8.0* 7.9* 8.2* 8.3*   WBC  --   --  9.9 10.5 11.0     Recent Labs   Lab 04/04/23  0741 04/03/23  0748 04/02/23  0713   CR 0.81 0.68 0.72      Recent Labs   Lab 03/30/23  0709 03/29/23  1204   PROTTOTAL 5.3* 5.3*   ALBUMIN 2.3* 2.2*   BILITOTAL 0.2 0.2    ALKPHOS 107* 120*   AST 21 15   ALT 14 14     Assessment: Successful drain exchange, reposition and upsize yesterday especially with significant output from the anterior drain. ~ 260 mL output since drain exchange 4/3.  -Feeling better today with more energy, less drainage from the open  incision  -Possible that with better drainage of the anterior fluid through the drain, less leaking through the incision and better healing.   -Uncertain why less output from posterior drain. Outputs are low, tube flushes easily, could be the fluid there is thicker     Plan: Continue to monitor outputs, flush as ordered.  -Encourage activity  -Patient may go home with drains in place, so will review discharge instructions at that time.   -Consider re imaging when outputs decrease to 10 mL or less daily    Total time spent on the date of the encounter is 20 minutes, including time spent counseling the patient, performing a medically appropriate evaluation, reviewing prior medical history, ordering medications and tests, documenting clinical information in the medical record, and communication of results.    Thanks [facility] Interventional Radiology CNP ([phone]) (phone [phone])

## 2023-04-04 NOTE — PROGRESS NOTES
GYN Progress Note    Feeling better. Swelling significantly decreased. Pain back to baseline after insertion of new drainage tubes yesterday. Less wound leakage  /80 (BP Location: Right arm)   Pulse 80   Temp 98.8  F (37.1  C) (Oral)   Resp 18   Wt 74.4 kg (164 lb)   SpO2 94%   BMI 28.15 kg/m    Abdomen: BS+, distended with mild tenderness, tympanitic. Drain sites covered  Extremities: tr edema  Hgb 8.1    ASSESSMENT:     Wound abscess, persistent on CT yesterday, new drainage tubes places. Management per ID and IR. Now afebrile x 36 hrs  Anemia- no source of blood loss, suspect due to level of illness  LE edema- dramatically decreased since admission    PLAN:    Will give 2 more doses of Venofer  Remaining management per hospitalist, IR, ID  Wound management outpatient would optimally be per Home Care, our clinic also a possibility    DAMIEN CISNEROS MD on 4/4/2023 at 9:27 AM   [phone]

## 2023-04-04 NOTE — PROGRESS NOTES
Per Chart review and discussing with patient she had CT performed yesterday and larger drains were placed. Wound having less output today per patient. WOC discussed with bedside nursing to continue packing with strip gauze and vashe to combat bacterial load. WO plans to perform follow up Thursday this week with hopes larger drains have helped drain this purulent material.     Marek Andrade RN CWOCN  -Securely message with [employer] (more info) - can reach individually by name or search 'WOC Nurse' ([place]) to reach all current WOCs on duty.  WOC Office Phone: [phone]

## 2023-04-04 NOTE — PROGRESS NOTES
Pt is AOx4, VSS on RA, Independent. PIV SL used for IV diluadid prior to dressing changes otherwise pain is well controlled by PRN tylenol. Pt has 2 drains R/L side, emptied this shift R is having much more output then L both purulent drainage. Flushed and aspirated by IR this morning. Wound care to public area done x1 this shift (per WOC nurse ok to cover dressing with mepilex for patient comfort orders do not reflect this). Refused ace wraps to BLE.. Discharge home possibly tomorrow.

## 2023-04-04 NOTE — PLAN OF CARE
Goal Outcome Evaluation:       [id number]  Pt A/O x4. VSS on RA. PRN oxy x3 for abd pain, IV dilaudid x1 before wound care. PIV SL w/ int abx, ice pack applied when infusing. LLQ SYMONE / R buttock SYMONE closed to bulb suction, irrigated q8h. Suprapubic wound dressed w/ mepilex. Purewick in place for urgency / incontinence, cont bowel. On regular diet. Discharge pending improvement.

## 2023-04-05 VITALS
OXYGEN SATURATION: 96 % | WEIGHT: 164 LBS | HEART RATE: 87 BPM | TEMPERATURE: 97.8 F | DIASTOLIC BLOOD PRESSURE: 66 MMHG | RESPIRATION RATE: 16 BRPM | SYSTOLIC BLOOD PRESSURE: 106 MMHG | BODY MASS INDEX: 28.15 KG/M2

## 2023-04-05 LAB
BACTERIA ABSC ANAEROBE+AEROBE CULT: ABNORMAL

## 2023-04-05 PROCEDURE — 250N000013 HC RX MED GY IP 250 OP 250 PS 637: Performed by: SPECIALIST

## 2023-04-05 PROCEDURE — 250N000011 HC RX IP 250 OP 636: Performed by: SPECIALIST

## 2023-04-05 PROCEDURE — 999N000198 HC STATISTIC WOC PT EDUCATION, 16-30 MIN

## 2023-04-05 PROCEDURE — 250N000013 HC RX MED GY IP 250 OP 250 PS 637: Performed by: STUDENT IN AN ORGANIZED HEALTH CARE EDUCATION/TRAINING PROGRAM

## 2023-04-05 PROCEDURE — 258N000003 HC RX IP 258 OP 636: Performed by: SPECIALIST

## 2023-04-05 PROCEDURE — G0463 HOSPITAL OUTPT CLINIC VISIT: HCPCS

## 2023-04-05 PROCEDURE — 250N000013 HC RX MED GY IP 250 OP 250 PS 637: Performed by: HOSPITALIST

## 2023-04-05 PROCEDURE — 99239 HOSP IP/OBS DSCHRG MGMT >30: CPT | Performed by: INTERNAL MEDICINE

## 2023-04-05 RX ORDER — IBUPROFEN 200 MG
400 TABLET ORAL EVERY 6 HOURS PRN
Start: 2023-04-05

## 2023-04-05 RX ORDER — ACETAMINOPHEN 500 MG
1000 TABLET ORAL EVERY 6 HOURS PRN
Start: 2023-04-05

## 2023-04-05 RX ORDER — AMOXICILLIN 250 MG
2 CAPSULE ORAL DAILY
Qty: 60 TABLET | Refills: 0 | Status: SHIPPED | OUTPATIENT
Start: 2023-04-05

## 2023-04-05 RX ORDER — OXYCODONE HYDROCHLORIDE 5 MG/1
5 TABLET ORAL EVERY 6 HOURS PRN
Qty: 12 TABLET | Refills: 0 | Status: ON HOLD | OUTPATIENT
Start: 2023-04-05 | End: 2023-04-12

## 2023-04-05 RX ADMIN — ACETAMINOPHEN 650 MG: 325 TABLET, FILM COATED ORAL at 09:04

## 2023-04-05 RX ADMIN — POTASSIUM CHLORIDE 20 MEQ: 1500 TABLET, EXTENDED RELEASE ORAL at 08:55

## 2023-04-05 RX ADMIN — SENNOSIDES AND DOCUSATE SODIUM 1 TABLET: 50; 8.6 TABLET ORAL at 08:55

## 2023-04-05 RX ADMIN — ACETAMINOPHEN 650 MG: 325 TABLET, FILM COATED ORAL at 14:11

## 2023-04-05 RX ADMIN — ACETAMINOPHEN 650 MG: 325 TABLET, FILM COATED ORAL at 00:52

## 2023-04-05 RX ADMIN — Medication 400 MG: at 08:55

## 2023-04-05 RX ADMIN — AMOXICILLIN AND CLAVULANATE POTASSIUM 1 TABLET: 875; 125 TABLET, FILM COATED ORAL at 08:55

## 2023-04-05 RX ADMIN — IRON SUCROSE 200 MG: 20 INJECTION, SOLUTION INTRAVENOUS at 08:56

## 2023-04-05 ASSESSMENT — ACTIVITIES OF DAILY LIVING (ADL)
ADLS_ACUITY_SCORE: 24

## 2023-04-05 NOTE — H&P (VIEW-ONLY)
OB/Gyn Progress Note    S: feeling ready to go. Tylenol not working as well at times but stronger meds do when needed. Tired but didn't sleep well. Noted light vaginal spotting.     O:  Patient Vitals for the past 24 hrs:   BP Temp Temp src Pulse Resp SpO2   04/05/23 0900 106/66 97.8  F (36.6  C) Oral 87 16 96 %   04/05/23 0000 119/78 98.4  F (36.9  C) Oral 89 16 96 %   04/04/23 1609 125/77 99.4  F (37.4  C) Oral 85 18 98 %       Intake/Output Summary (Last 24 hours) at 4/5/2023 1211  Last data filed at 4/5/2023 0904  Gross per 24 hour   Intake --   Output 1590 ml   Net -1590 ml     AO, NAD  Abd: soft, NT  Incision: covered  Drains with creamy discharge  Ext: NT    Hgb 8.1 (s/p 3 doses of venofer and 1uprbc)     A/P: 38yo  HD#7 s/p PLTCS 3/5/23, now s/p IR drainage of pelvic abscess. Admission also notable for significant BLE edema and severe anemia.     Now afebrile and approaching discharge. Reviewed possibility of assisting with dressing changes in clinic but strongly encouraged independence with these for convienence.  Recommended oral iron and follow-up in clinic tomorrow if dressing change needed or with in a few days if dressing changes covered.     Tennille Cordova MD on 4/5/2023 at 12:24 PM

## 2023-04-05 NOTE — CONSULTS
Care Management Initial Consult    General Information  Assessment completed with: Patient, patient  Type of CM/SW Visit: CM Role Introduction    Primary Care Provider verified and updated as needed:  ([facility])   Readmission within the last 30 days: no previous admission in last 30 days   Reason for Consult: discharge planning  Advance Care Planning:            Communication Assessment  Patient's communication style: spoken language (English or Bilingual)    Hearing Difficulty or Deaf: no   Wear Glasses or Blind: no    Cognitive  Cognitive/Neuro/Behavioral: WDL                      Living Environment:   People in home: spouse  Alfredo  Current living Arrangements: house      Able to return to prior arrangements: yes       Family/Social Support:  Care provided by: self  Provides care for: child(ashley), pet(s)  Marital Status:   , Parent(s)  Alfredo       Description of Support System: Supportive, Involved    Support Assessment: Adequate family and caregiver support, Adequate social supports    Current Resources:   Patient receiving home care services: No     Community Resources: None  Equipment currently used at home: none  Supplies currently used at home: None    Employment/Financial:  Employment Status: other (see comments) (on leave)        Financial Concerns: No concerns identified   Referral to Financial Worker: No       Lifestyle & Psychosocial Needs:  Social Determinants of Health     Tobacco Use: Low Risk  (4/4/2023)    Patient History      Smoking Tobacco Use: Never      Smokeless Tobacco Use: Never      Passive Exposure: Not on file   Alcohol Use: Not on file   Financial Resource Strain: Not on file   Food Insecurity: Not on file   Transportation Needs: Not on file   Physical Activity: Not on file   Stress: Not on file   Social Connections: Not on file   Intimate Partner Violence: Not on file   Depression: Not at risk (2/26/2020)    PHQ-2      PHQ-2 Score: 0    Housing Stability: Not on file   Postpartum Depression: Not on file (3/6/2023)       Functional Status:  Prior to admission patient needed assistance: independent           Mental Health Status:          Chemical Dependency Status:                Values/Beliefs:  Spiritual, Cultural Beliefs, Worship Practices, Values that affect care:                 Additional Information:  Writer met with the patient at the bedside, patient's Father at bedside as well.  Patient is possibly cleared for discharge to home today. Writer explained role and scope of safe discharge planning.  Patient requiring daily wound cares to abdomen and has SYMONE drains x2 with flushing.  Writer discussed the following with the patient:  We are attempting to find a homecare agency for continued assessment of wound and wound cares, however homecare comes 2-3 times a week for an hour at a time. Homecare Agency would require a primary learner in the home. Patient stated she is very concerned about doing the wound cares on her own because of what happened with her infection and feels a medical person would be best to assess and help with dressing changes. Patient also mentioned going to her OB/GYN clinic for dressing changes (writer noted clinic would be M-F only).   Patient is hopeful to have a wound vac with homecare three times a week. Writer informed patient that if wound vac is indicated we would work on getting a prior auth and homecare for 3x weekly wound vac dressing exchanges.  Writer left a message with WOC team for further confirmation and will work with Hospitalist/OBGYN if appropriate to get the RX for the wound vac started.  IR was in with the patient to provide teaching for the drains and flushing.  Patient will need to go home with NS for flushing per IR instructions 20 CC's 1xdaily for back SYMONE and 2xdaily for front. IR to put detailed instructions and teaching in AVS. IR will call the patient for update on output to determine imaging  and removal of drains. IR asked that the patient be sent with abdominal binder and tube stabilizers.  Patient is aware that bedside will go over all discharge instructions and teaching.  Writer informed patient that we will continue to provide updates regarding discharge and discharge planning.  [facility] has deferred homecare and is working through their HUB to identify homecare agency. OB/GYN would be primary for continued orders. [facility] phone [phone].    Addendum: 4/5/23 09:30  WOC RN to see the patient today to evaluate if wound vac would be appropriate. WO RN to update this writer after visit.    Addendum: 4/5/23 10:30  Per [facility] RN NOT recommending a wound vac but dressing changes. Writer will notify rounding provider unfortunately no homecare agency has been identified and per [facility] they have received >14 denials.    Addendum: 4/5/23 14:47  Writer received a call from [facility] they are able to take the patient for start of care 4/8/23 for homecare RN.  Orders faxed to [phone] Phone#[phone]. Writer also talked to OB/GYN clinic they will see the patient 4/6 and will determine if she requires 4/7 visit. Patient aware to bring her supplies and copy of her wound cares to the clinic.  Writer updated AVS discharge orders and updated bedside. No further needs identified.    Care Management Discharge Note    Discharge Date: 04/05/2023       Discharge Disposition: Home, Home Care, DME    Discharge Services: None    Discharge DME: Wound Vac (possibly)    Discharge Transportation: family or friend will provide    Private pay costs discussed: Not applicable    PAS Confirmation Code:  n/a  Patient/family educated on Medicare website which has current facility and service quality ratings:  yes    Education Provided on the Discharge Plan: Yes  Persons Notified of Discharge Plans: yes  Patient/Family in Agreement with the Plan: yes    Handoff  Referral Completed: Yes    Additional Information:  See full consult listed above.      Heather Mckeon RN, BSN, ACM   Care Transitions Specialist  [facility]  Care Transitions Specialist  [id number] [id number]  [email or web address]  Office: [phone] Fax: [phone]  [facility]

## 2023-04-05 NOTE — DISCHARGE SUMMARY
[facility]  Hospitalist Discharge Summary      Date of Admission:  3/29/2023  Date of Discharge:  2023  Discharging Provider: Manasa Wild MD  Discharge Service: Hospitalist Service    Discharge Diagnoses     Pelvic and abdominal abscesses 8 x 7 cm, s/p CT-guided drain placement x 2 on 3/30 and drain exchange/repositioning/upsize on 4/3    Surgical site infection    Status post  3/5/2023     Acute blood loss anemia due to surgery and delivery     S/p PRBC transfusion this admission      Wide-complex tachycardia, 9 beats at 7 AM 3/30/2023    Hypokalemia    Lower extremity edema with hypoalbuminemia    Small, bilateral pleural effusions on admission chest x-ray     Mild right hydronephroses    Hypoalbuminemia: Lowest albumin = 2.2 g/dL     Follow-ups Needed After Discharge   Follow-up Appointments     Follow-up and recommended labs and tests       You are scheduled to see your OB/GYN office on    at 11:10 a.m. located at:  [facility]  [facility] [address], [place] [address] [phone]  **Please bring all of your wound care supplies and your teaching   instructions to these appointments.  They will give you information for a   Friday appointment if needed when you see them tomorrow 23.    [facility] will be able to see you for start of care on .  They will call you within the next 48 hours to schedule an in   home visit and assessment.  If you have questions please call them at   [phone].    If you have questions regarding your discharge orders when you go home   please call the unit directly at [phone] to be directed to the Charge   Nurse on the unit.         {Additional follow-up instructions/to-do's for PCP    :    Unresulted Labs Ordered in the Past 30 Days of this Admission     Date and Time Order Name Status Description    2023  5:41 AM Blood Culture Hand, Left Preliminary            Discharge Disposition   Discharged to home with home health care, RN  Condition at discharge: Stable      Hospital Course     Aicha Draper, 37 year old female underwent  on 3/5/23 and presented on 3/29/2023 with abdominal pain due to pelvic abscess and wound infection.         Pelvic and abdominal abscesses 8 x 7 cm, s/p CT-guided drain placement x 2 on 3/30 and drain exchange/repositioning/upsize on 4/3  Surgical site infection  Status post  3/5/2023  - presented with abdominal pain, lower extremity edema  - CT showed pelvic abscesses measuring 8 x 7 cm and abdominal abscess    - S/p CT-guided drain placement into pelvic and abdominal abscess by IR on 3/30  - Follow-up CT scan on 4/3 showed abscesses smaller than before but not resolved  - On 4/3 underwent abscess drain check that showed large persistent left lower quadrant abscess crossing midline.  A new 14 Cypriot pigtail catheter was placed through existing access site using ultrasound guidance.  Left lower quadrant injection showed persistent abscess cavity in pelvis.  This was successfully upsized to 12 Cypriot pigtail drain.  - Abscess culture polymicrobial growth- bacteroides fragilis and peptniphilus asaccharolyticus   - Blood cultures negative  - Infectious disease consulted.  Was initially on IV Zosyn, transitioned to Augmentin on .  Total duration of treatment is 2 weeks, continue antibiotics until   - Discharged with drain in place.  Outpatient drain management and CT scan will be per IR.  Recommend saline flushes to Cypriot drain twice daily and to bag drain once daily as per IR's recommendation   - Pain control with Tylenol, ibuprofen, oxycodone  - Senna with Colace twice daily for bowel prophylaxis  - WOC consulted.  Will need twice daily dressing changes.  Patient will go to outpatient clinic daily for dressing changes once a day and will do evening dressing changes at home with help of her .        Acute blood  loss anemia due to surgery and delivery   S/p PRBC transfusion this admission   -Hemoglobin 6.6 on admission on 3/29.  Was 8 on 3/6 following   -Blood loss likely due to recent , menstrual blood loss following delivery  -S/p PRBC transfusion  -Hemoglobin improved to 8 and has been stable for past several days.   -Received Venofer in hospital.  Continue ferrous sulfate as outpatient     Wide-complex tachycardia, 9 beats at 7 AM 3/30/2023  Hypokalemia  Self-limited, 9 beat run of wide-complex tachycardia morning of admission, likely secondary to acute infection, severe anemia, and severe hypokalemia.  No recurrence.  No cardiopulmonary symptoms.    Normal transthoracic echocardiogram 3/30/2023, EF 60%  Electrolytes replaced       Lower extremity edema with hypoalbuminemia  Small, bilateral pleural effusions on admission chest x-ray  - Lower extremity ultrasound on admission negative for DVT.  - Low albumin on admission, 2.3 on 3/30/2023.  Suspect lower extremity edema related to hypoalbuminemia in the setting of recent pregnancy, infection, and surgery.   - Elevate of legs to reduce edema  - Edema improved       Mild right hydronephroses  - Mild hydronephrosis noted on admission CT scan  - Cr stable, good urine output, no evidence of UTI.     Disposition   Discharged home with home health care with RN                       Consultations This Hospital Stay   INTERVENTIONAL RADIOLOGY ADULT/PEDS IP CONSULT  OB GYN IP CONSULT  INFECTIOUS DISEASES IP CONSULT  WOUND OSTOMY CONTINENCE NURSE  IP CONSULT  VASCULAR ACCESS ADULT IP CONSULT  VASCULAR ACCESS ADULT IP CONSULT  CARE MANAGEMENT / SOCIAL WORK IP CONSULT    Code Status   Full Code    Time Spent on this Encounter   I, Manasa Wild MD, personally saw the patient today and spent greater than 30 minutes discharging this patient.       Manasa Wild MD  [place] [address] ONCOLOGY  [facility] [address]  [facility] [address]  Phone:  [phone]  ______________________________________________________________________    Physical Exam   Vital Signs: Temp: 97.8  F (36.6  C) Temp src: Oral BP: 106/66 Pulse: 87   Resp: 16 SpO2: 96 % O2 Device: None (Room air)    Weight: 164 lbs 0 oz    Constitutional - awake and alert, resting in bed, in no acute distress  Cardiovascular - regular rate and rhythm, no murmurs, no edema  Pulmonary - lungs are clear to auscultation bilaterally, no wheezing or rhonchi  Neurological - awake, alert and oriented x3.  Moving all 4 extremities, normal speech, no focal deficits       Primary Care Physician   Physician No Ref-Primary    Discharge Orders      Home Care Referral      Reason for your hospital stay    Wound infection, pelvic abscess     Follow-up and recommended labs and tests     You are scheduled to see your OB/GYN office on Thursday April 6th, 2023 at 11:10 a.m. located at:  [facility] in [facility]  [facility] [place] [id number] [phone]  **Please bring all of your wound care supplies and your teaching instructions to these appointments.  They will give you information for a Friday appointment if needed when you see them tomorrow 4/6/23.    [facility] will be able to see you for start of care on Saturday April 8th.  They will call you within the next 48 hours to schedule an in home visit and assessment.  If you have questions please call them at [phone].    If you have questions regarding your discharge orders when you go home please call the unit directly at [phone] to be directed to the Charge Nurse on the unit.     Activity    Your activity upon discharge: activity as tolerated     Discharge Instructions    Home Healthcare Skilled Nursing start of care on Saturday April 8, 2023 through [facility]     Diet    Follow this diet upon discharge: Regular       Significant Results and Procedures   Results for orders placed or performed during the hospital  encounter of 23   CT Chest (PE) Abdomen Pelvis w Contrast    Narrative    CT CHEST PE ABDOMEN AND PELVIS WITH CONTRAST 3/29/2023 3:25 PM    CLINICAL HISTORY: SOB, PE,  wound infection.    TECHNIQUE: CT angiogram chest and routine CT abdomen pelvis with IV  contrast. Arterial phase through the chest and venous phase through  the abdomen and pelvis. 2D and 3D MIP reconstructions were preformed  by the CT technologist. Dose reduction techniques were used.     CONTRAST: 82mL Isovue-370    COMPARISON: None.    FINDINGS:  ANGIOGRAM CHEST: Pulmonary arteries are normal caliber and negative  for pulmonary emboli. Thoracic aorta is negative for dissection. No CT  evidence of right heart strain.     LUNGS AND PLEURA: Small bilateral pleural effusions. Adjacent  compressive atelectasis. Mild groundglass opacity in the upper lungs  may represent additional atelectasis or edema.    MEDIASTINUM/AXILLAE: No lymphadenopathy. No coronary artery  calcification.    HEPATOBILIARY: Cholecystectomy.    PANCREAS: Normal.    SPLEEN: Normal.    ADRENAL GLANDS: Normal.    KIDNEYS/BLADDER: Mild right hydronephrosis. The right ureter is  dilated into the renal pelvis. No obstructing stone.    BOWEL: Normal.    LYMPH NODES: Normal.    PELVIC ORGANS: Uterus is unremarkable.    OTHER: There is a fluid collection in the pelvic cul-de-sac measuring  8 x 7 cm. There is a thin loculated fluid collection in the peritoneal  cavity for example on series 8 image 151 measuring 2 cm maximum  thickness. This does contain a small amount of air superiorly.    MUSCULOSKELETAL: Normal.      Impression    IMPRESSION:  1.  No evidence of pulmonary embolus.  2.  Loculated fluid collections in the pelvic cul-de-sac and the lower  abdomen may represent abscesses.  3.  Mild right hydronephrosis of uncertain etiology, possibly  secondary to the pelvic fluid collection.  4.  Small bilateral pleural effusions.    SILVIA NAJERA MD         SYSTEM ID:   [id number]   US Lower Extremity Venous Duplex Bilateral    Narrative    VENOUS ULTRASOUND BOTH LEGS  3/29/2023 3:15 PM     HISTORY: swelling, DVT    COMPARISON: None.    FINDINGS:  Examination of the deep veins with graded compression and  color flow Doppler with spectral wave form analysis was performed.   There is no evidence for DVT in the visualized veins of the lower  extremities.      Impression    IMPRESSION: No evidence of deep venous thrombosis.    NINA TOM MD         SYSTEM ID:  [id number]   CT Abdomen Peritonium Abscess Drainage    Narrative    EXAM:  1. PERCUTANEOUS DRAIN PLACEMENT X2 INTO PELVIS AND LOWER ABDOMINAL  ABSCESSES  2. CT GUIDANCE  3. CONSCIOUS SEDATION  DATE/TIME: 3/30/2023 10:41 AM    INDICATION: Pelvic and abdominal abscesses  TECHNIQUE: Dose reduction techniques were used.    PROCEDURE: Informed consent obtained. Site marked. Prior images  reviewed. Required items made available. Patient identity confirmed  verbally and with arm band. Patient reevaluated immediately before  administering sedation. Universal protocol was followed. Time out  performed. The site was prepped and draped in sterile fashion. 10 mL  of 1% lidocaine was infused into the local soft tissues. Using  standard technique and under direct CT guidance, a 10 Cambodian drainage  catheter was inserted into the pelvic fluid collection via a right  transgluteal approach. The same technique was subsequently utilized to  insert a 10 Cambodian drainage catheter into a thin lower abdominal fluid  collection.    SPECIMEN: 70 mL of purulent fluid was aspirated from the pelvic fluid  collection, and 10 mL purulent fluid from the abdominal fluid  collection, and sent to lab for cultures and Gram stain.    BLOOD LOSS: Minimal.    The patient tolerated the procedure well. No immediate complications.    RADIOLOGIC SUPERVISION AND INTERPRETATION:   CT GUIDANCE: Images demonstrate the needle and subsequent catheters to  be in good  position.    SEDATION: Versed 1 mg. Fentanyl 50 mcg. The procedure was performed  with administration intravenous conscious sedation with appropriate  preoperative, intraoperative, and postoperative evaluation.    42 minutes of supervised face to face conscious sedation time was  provided by a radiology nurse under my direct supervision.      Impression    IMPRESSION:  1.  Successful CT-guided drain placement into pelvic and abdominal  abscesses.    SILVIA NAJERA MD         SYSTEM ID:  [id number]   CT Abdomen Pelvis w Contrast    Narrative    CT ABDOMEN PELVIS WITH CONTRAST 4/3/2023 11:11 AM    CLINICAL HISTORY: Follow up pelvic abscess.    TECHNIQUE: CT scan of the abdomen and pelvis was performed following  injection of IV contrast. Multiplanar reformats were obtained. Dose  reduction techniques were used.  CONTRAST: 82mL Isovue-370    COMPARISON: 3/29/2023    FINDINGS:   LOWER CHEST: Small bilateral pleural effusions have decreased.    HEPATOBILIARY: Cholecystectomy.    PANCREAS: Normal.    SPLEEN: Normal.    ADRENAL GLANDS: Normal.    KIDNEYS/BLADDER: Persistent mild right hydroureteronephrosis. The  ureter is dilated into the pelvis.    BOWEL: Normal.    PELVIC ORGANS: Normal.    ADDITIONAL FINDINGS: Decreased size of an abscess in the pelvic  cul-de-sac, with new drainage catheter in good position. The abscess  measures 4.5 x 6.3 cm compared to previous 8 x 7 cm. A second, thin  lower abdominal fluid collection is also smaller than previous and  contains a drainage catheter in good position. This fluid collection  extends across midline bilaterally in the anterior peritoneal cavity,  measures 2 cm in maximum thickness. Both collections contain gas  bubbles likely due to the catheters.    MUSCULOSKELETAL: Normal.      Impression    IMPRESSION: New percutaneous drainage catheters in pelvic and lower  abdominal abscesses. The abscesses are smaller than previous although  have not resolved. Suggest continued  drainage, versus consideration of  upsizing the catheters.      SILVIA NAJERA MD         SYSTEM ID:  [id number]   IR Abscess Tube Change    Narrative    INTERVENTIONAL RADIOLOGY ABSCESS TUBE CHANGE   4/3/2023 2:43 PM    LOCATION: [facility]  DATE: 4/3/2023    PROCEDURE:   1. Abscess drain check x 2.   2. Abscess drain removal and ultrasound guided abscess drain placement  x 1  3. Abscess drain change x 1.     INTERVENTIONAL RADIOLOGIST: Alejandra Wong DO.    INDICATION: Right posterior and midline abscess is, minimally improved  when compared to predrain placement. Decreased output.    CONSENT: The risks, benefits and alternatives of planned procedure  were discussed with the patient  in detail. All questions were  answered. Informed consent was given to proceed with the procedure.    MODERATE SEDATION: Fentanyl 125 mcg IV.  Under physician supervision,  Versed and fentanyl were administered for moderate sedation. Pulse  oximetry, heart rate and blood pressure were continuously monitored by  an independent trained observer. The physician spent 50 minutes of  face-to-face sedation time with the patient.    CONTRAST: 30 mL Isovue  ANTIBIOTICS: None.  ADDITIONAL MEDICATIONS: None.    FLUOROSCOPIC TIME: 4 minutes.  RADIATION DOSE: Air Kerma: 29 mGy.    COMPLICATIONS: No immediate complications.    STERILE BARRIER TECHNIQUE: Maximum sterile barrier technique was used.  Cutaneous antisepsis was performed at the operative site with  application of 2% chlorhexidine and large sterile drape. Prior to the  procedure, the  and assistant performed hand hygiene and wore  hat, mask, sterile gown, and sterile gloves during the entire  procedure.    PROCEDURE/FINDINGS:    The left lower quadrant and existing drain were prepped and draped in  sterile fashion. A  image was obtained, images show a pigtail  catheter in the left lower quadrant. Contrast was injected, images  show a small collection around the  pigtail, with contrast extending  across midline into the known abscess that tracks across midline. 1%  lidocaine was used for local anesthesia. The existing drain and suture  were cut. A 0.035 wire was advanced through the drain. The drain was  removed over the wire. Over the wire 5 Bangladeshi Kumpe catheter was  advanced into the collection. Multiple times were made to gain access  towards the midline portion of the fluid collection without success.  The decision was made to place a new drain through the existing hole.    Limited preprocedure ultrasound was performed, images show a large  abscess collection, similar to the recent CT. An image was archived.  Under direct ultrasound guidance a 5 Bangladeshi Yueh catheter was advanced  into the abscess. The catheter was advanced off the needle. A 0.035  wire was advanced through the catheter. The catheter was removed over  the wire. The tract was dilated and a 14 Bangladeshi locking pigtail drain  was advanced into the fluid collection. The pigtail was formed and  locked. Drain was sutured in with 2-0 silk suture. Contrast was  injected which shows good positioning of the pigtail catheter.  Catheter was connected to a SYMONE bulb. Sterile dressing was applied.    The patient was then placed in the decubitus position. The existing  transgluteal drain was prepped and draped in sterile fashion. Contrast  was injected, images show a moderate persistent abscess, similar to  CT. 1% lidocaine was used for local anesthesia. The existing drain and  suture were cut. A 0.035 wire was advanced through the existing drain.  The drain was removed over the wire. Over the wire a new 12 Bangladeshi  pigtail catheter was advanced into the fluid collection. The pigtail  was formed and locked. The drain was sutured in with 2-0 silk suture.  Contrast was injected which shows good positioning of the pigtail  catheter. The catheter was connected to a SYMONE bulb. A sterile dressing  was applied.      Impression     IMPRESSION:    1. Left lower quadrant drain injection shows a large persistent  abscess crossing midline. Attempts were made to reposition the drain  so that it would cross midline without success.  A new 14 Prydeinig  pigtail catheter was placed through the existing access site using  ultrasound guidance.  2. Left lower quadrant drain injection shows persistent abscess cavity  in the pelvis. Successful exchange/upsize to a 12 Prydeinig locking  pigtail drain.    MK SCOTT DO         SYSTEM ID:  [id number]   Echocardiogram Complete     Value    LVEF  60%    Narrative    [id number]  [address]  2010^JOLEEN^JOE^CIRO     [facility]  Echocardiography Laboratory  [address] [place] [address]     Name: KATIE TENA  MRN: [id number]  : 1985  Study Date: 2023 08:21 AM  Age: 37 yrs  Gender: Female  Patient Location: [facility]  Reason For Study: Edema  Ordering Physician: JOE GOODRICH  Referring Physician: JOE GOODRICH  Performed By: DIMA Man     BSA: 1.8 m2  Height: 64 in  Weight: 164 lb  HR: 80  BP: 135/75 mmHg  ______________________________________________________________________________  Procedure  Complete Portable Echo Adult. Optison (NDC #9127-6838) given intravenously.  ______________________________________________________________________________  Interpretation Summary     1. The left ventricle is normal in structure, function and size. The visual  ejection fraction is estimated at 60%.  2. The right ventricle is normal in structure, function and size.  3. No valve disease.  4. Normal IVC size, reduced collapse.     No previous echo for comparison.  ______________________________________________________________________________  Left Ventricle  The left ventricle is normal in structure, function and size. There is normal  left ventricular wall thickness. The visual ejection fraction is estimated at  60%. Left ventricular diastolic function is  normal. Normal left ventricular  wall motion.     Right Ventricle  The right ventricle is normal in structure, function and size.     Atria  Normal left atrial size. Right atrial size is normal. There is no atrial shunt  seen.     Mitral Valve  The mitral valve is normal in structure and function.     Tricuspid Valve  There is trace tricuspid regurgitation. Right ventricular systolic pressure  could not be approximated due to inadequate tricuspid regurgitation.     Aortic Valve  The aortic valve is normal in structure and function.     Pulmonic Valve  The pulmonic valve is normal in structure and function.     Vessels  Normal ascending, transverse (arch), and descending aorta. Normal IVC size,  reduced collapse.     Pericardium  There is no pericardial effusion.     Rhythm  Sinus rhythm was noted.  ______________________________________________________________________________  MMode/2D Measurements & Calculations  IVSd: 1.0 cm     LVIDd: 4.7 cm  LVIDs: 3.2 cm  LVPWd: 1.1 cm  FS: 31.7 %  LV mass(C)d: 175.1 grams  LV mass(C)dI: 97.4 grams/m2  Ao root diam: 3.0 cm  asc Aorta Diam: 2.9 cm  LVOT diam: 2.1 cm  LVOT area: 3.4 cm2  LA Volume (BP): 46.4 ml  LA Volume Index (BP): 25.8 ml/m2  RV Base: 3.4 cm  RWT: 0.46     TAPSE: 2.7 cm     Doppler Measurements & Calculations  MV E max jamshid: 107.0 cm/sec  MV A max jamshid: 79.0 cm/sec  MV E/A: 1.4  MV dec time: 0.17 sec  Pulm Sys Jamshid: 47.2 cm/sec  Pulm Levine Jamshid: 48.7 cm/sec  Pulm A Revs Jamshid: 31.8 cm/sec  Pulm S/D: 0.97  E/E' av.4  Lateral E/e': 6.4  Medial E/e': 10.3  RV S Jamshid: 11.9 cm/sec     ______________________________________________________________________________  Report approved by: Oscar Mazariegos 2023 09:17 AM               Discharge Medications   Current Discharge Medication List      START taking these medications    Details   amoxicillin-clavulanate (AUGMENTIN) 875-125 MG tablet Take 1 tablet by mouth every 12 hours for 7 days  Qty: 14 tablet,  Refills: 0    Associated Diagnoses: Pelvic abscess in female      oxyCODONE (ROXICODONE) 5 MG tablet Take 1 tablet (5 mg) by mouth every 6 hours as needed for severe pain (IF pain not managed with non-pharmacological and non-opioid interventions)  Qty: 12 tablet, Refills: 0    Associated Diagnoses: Pelvic abscess in female      senna-docusate (SENOKOT-S/PERICOLACE) 8.6-50 MG tablet Take 2 tablets by mouth daily  Qty: 60 tablet, Refills: 0    Associated Diagnoses: Pelvic abscess in female      sodium chloride, PF, (SALINE FLUSH) 0.9% PF flush 10 ml NS flush twice a day to front drain and once a day to back drain  Qty: 300 mL, Refills: 1    Comments: Please cancel previous order  of flushes  Associated Diagnoses: Pelvic abscess in female         CONTINUE these medications which have CHANGED    Details   acetaminophen (TYLENOL) 500 MG tablet Take 2 tablets (1,000 mg) by mouth every 6 hours as needed for mild pain    Associated Diagnoses: Pelvic abscess in female      ibuprofen (ADVIL/MOTRIN) 200 MG tablet Take 2 tablets (400 mg) by mouth every 6 hours as needed for pain    Associated Diagnoses: Pelvic abscess in female         CONTINUE these medications which have NOT CHANGED    Details   calcium carbonate (TUMS) 500 MG chewable tablet Take 2 chew tab by mouth 4 times daily as needed for heartburn      ferrous sulfate (FEROSUL) 325 (65 Fe) MG tablet Take 1 tablet (325 mg) by mouth 2 times daily  Qty: 50 tablet, Refills: 0    Associated Diagnoses: Anemia due to blood loss, acute      fish oil-omega-3 fatty acids 1000 MG capsule Take 2 g by mouth every evening      omeprazole (PRILOSEC) 20 MG DR capsule Take 20 mg by mouth every evening      Prenatal Vit-Fe Fumarate-FA (PRENATAL MULTIVITAMIN W/IRON) 27-0.8 MG tablet Take 1 tablet by mouth daily      vitamin C (ASCORBIC ACID) 1000 MG TABS Take 1,000 mg by mouth daily      vitamin D3 (CHOLECALCIFEROL) 50 mcg (2000 units) tablet Take 1 tablet by mouth daily         STOP  taking these medications       cephALEXin (KEFLEX) 500 MG capsule Comments:   Reason for Stopping:         furosemide (LASIX) 20 MG tablet Comments:   Reason for Stopping:             Allergies   Allergies   Allergen Reactions     No Known Drug Allergies

## 2023-04-05 NOTE — PLAN OF CARE
A&Ox4, VSS on RA, Skin: abd wound dressing change CDI, two JPs R & L with purulent drainage irrigated.  will be learning how to do wound cares, and she will have help through OBGYN and home care nurse on the weekend. Pt pain has been managed with tylenol. Adequate output, and tolerating regular diet.      Discharge: Today around 1600

## 2023-04-05 NOTE — PROVIDER NOTIFICATION
MD Notification    Notified Person: MD    Notified Person Name: Junito     Notification Date/Time: 4/5/23 09:57    Notification Interaction: FYI page regarding discharge assistance for possible  Wound vac, homecare and NS flushes    Purpose of Notification: page regarding discharge planning    Orders Received: MD will assist with rx for wound vac if recommended.  4/5 11:50 Updated MD per WOC wound vac NOT recommended and BID dressing changes patient would like homecare RN as well as outpatient OB/GYN.    Comments:

## 2023-04-05 NOTE — PROGRESS NOTES
Discharge Note    Patient discharged to home via private vehicle  accompanied by significant other .  IV: Discontinued  Prescriptions filled and given to patient/family.   Belongings reviewed and sent with patient.   Home medications returned to patient: NA  Equipment sent with: patient.   patient verbalizes understanding of discharge instructions. AVS given to patient.

## 2023-04-05 NOTE — PROGRESS NOTES
Pt is alert and oriented x 4, VSS on RA, PRN Tylenol given x 1. Dressing is CDI, PIV SL, pure wick in place with adequate output. J P drain irrigated x 1. Pt is  on a regular diet. Possible discharge 04/05/23

## 2023-04-05 NOTE — PROGRESS NOTES
[facility] Nurse Inpatient Assessment     Consulted for: Suprapubic wound    4/5 WO notified that pt hoping to discharge today and is wondering about wound vac.  She is very concerned about re-infection and doing cares at home.  On assessment, wound still has small pocket at 12 o'clock with continued small purulent drainage.  [facility] recommending to continue BID Vashe-moist gauze packing instead of vac, to avoid any possible complications of negative pressure and to continue an antimicrobial agent in wound bed.  Wound is also quite small and easy to dress/pack.  Discussed with pt and care coordinator.  Current plan is to have pt go to clinic once a day for at least a few days for first dressing change, with  doing second dressing change in evening.  Will plan to teach  wound cares prior to pt discharge.  Pt in agreement with plan.    Addendum 3:50pm: per Care Coordinator, pt will now have home care to help with some of the dressing changes starting this weekend and pt will also go to clinic tomorrow Th 4/6 for follow-up visit.  Pt's  Alfredo present this afternoon to learn wound cares from [facility] and did very well with return demonstration.  Supplies for discharge are in room.  Pt and  verbalize satisfaction with care plan.  Pt planning to discharge home this afternoon.     Patient History (according to provider note(s):      Aicha Draper is a 37 year old female who is s/p C section on 3/5/2023 admitted on 3/29/2023 with surgical site infection, severe anemia, and bilateral lower extremity edema.       Pelvic abscesses   Surgical site infection   Pt is s/p C section on 3/5/2023. She saw her OB on 3/27 after she noted that her C section incision opened up and started to ooze purulent material. She was started on Keflex at this time. She was seen in follow-up today and had persistent drainage from her incision as well as significant bilateral lower extremity  swelling (see below) so was sent to the ED for further evaluation.   *On examination, she has marked purulent drainage coming from her surgical incision. There does not appear to be surrounding cellulitis of the skin.  * CT of the abdomen showed loculated fluid collections in the pelvic cul-de-sac and lower abdomen which may represent abscess (fluid collection measures 8 x 7 cm).   * Pt was given a dose of Ancef prior to CT results   * The patient is hemodynamically stable and non-septic appearing   - Admit to inpatient   - Will broaden to zosyn given the intra-abdominal fluid collections  - Blood cultures ordered (collected after receiving Ancef)   - Will obtain cultures of the purulent drainage   - IR consultation for consideration of percutaneous drainage   - OB/Gyn Consultation   - Consider ID consult for antibiotic guidance        Areas Assessed:      Areas visualized during today's visit: Abdomen    Wound location: suprapubic      Last photo: 23      Wound due to: Surgical Wound  Wound history/plan of care: 2023 had low transverse  section. Pt was recovering well but developed drainage from her incision about a week PTA. Was having copious purulent drainage in clinic on Monday 3/27 and started on Keflex. Reassessment by WOC 4/3  Noted large pocket of pus drainage at 12 o'clock. Paged OMAR pt on her way to CT immediately after WOC visit 4/3.  Pt now with new drains in place and hoping to discharge once wound care plan is figured out.  Pt has significant concern for re-infection and is anxious about doing cares at home with family.  WOC addressed concerns and pt states feeling more comfortable.    Wound base: 80 % red moist dermis, 20 % adipose tissue     Palpation of the wound bed: normal      Drainage: moderate     Description of drainage: moderate mix serosanguinous and creamy/purulent     Measurements (length x width x depth, in cm): 0.1  x 3.7  x  1.5+ cm linear  open incision     Tunneling: at 12 O'clock did not fully probe as unable to see area, probes at least 1cm     Underminin-2 cm from 3 to 9 O' Clock along incision line  Periwound skin: Intact      Color: pink      Temperature: normal   Odor: none  Pain: mild, tender   Pain interventions prior to dressing change: slow and gentle cares , tolerated well  Treatment goal: Heal , Drainage control, Infection control/prevention, Increase granulation and Simplify plan of care  STATUS: evolving pus pocket but wound bed appears clean and stable  Supplies ordered: gathered       Treatment Plan:     Suprapubic wound: BID and PRN  1.  Cleanse wound with Vashe #[id number]). Then gently dry out wound bed with dry gauze.  2.  Pack wound with Vashe-moistened Mesalt ribbon or Kerlix gauze.    3.  Apply Cavilon No Sting Skin Prep (#[id number]) to periwound and allow to dry (only need to do once daily, ie morning dressing change).   4.  Cover wound with Mepilex or gauze and tape.   5.  Time and date dressing change.      Orders: Reviewed and Updated    RECOMMEND PRIMARY TEAM ORDER: None, at this time  Education provided: plan of care, Infection prevention , Moisture management and Hygiene  Discussed plan of care with: Patient, Nurse and care coordinator  WOC nurse follow-up plan: twice weekly and prn; will return this afternoon for wound care teaching if pt discharging  Notify WOC if wound(s) deteriorate.  Nursing to notify the Provider(s) and re-consult the WOC Nurse /if new skin concern.    DATA:     Current support surface: Standard  Standard gel/foam mattress (IsoFlex, Atmos air, etc)  Containment of urine/stool: Purewick external catheter   BMI: Body mass index is 28.15 kg/m .   Active diet order: Orders Placed This Encounter      Regular Diet Adult     Output: I/O last 3 completed shifts:  In: -   Out: 2655 [Urine:2450; Drains:205]     Labs:   Recent Labs   Lab 23  0741 23  0748 23  0713 23  0733 23  0709    ALBUMIN  --   --   --   --  2.3*   HGB 8.1*   < > 7.9*   < > 8.0*   WBC  --   --  9.9   < > 8.3    < > = values in this interval not displayed.     Pressure injury risk assessment:   Sensory Perception: 4-->no impairment  Moisture: 4-->rarely moist  Activity: 3-->walks occasionally  Mobility: 4-->no limitation  Nutrition: 3-->adequate  Friction and Shear: 3-->no apparent problem  Ibrahima Score: 21    Micheline Mehta RN CWOCN  -Securely message with [employer] (more info) - can reach individually by name or search 'WOC Nurse' (Diana) to reach all current WOCs on duty.  -WOC Office Phone: [phone]

## 2023-04-05 NOTE — DISCHARGE INSTRUCTIONS
Radiology drain discharge instructions (Left front/pelvic and Right posterior)     1) Change the Stayfix dressings every 7 days. Wash the skin with soap and water and allow to air dry before re dressing  -Please cover the drains with plastic (saran wrap) prior to showering and change if the dressing gets wet.     2) Flush the left pelvic drain with 10  mL Normal Saline (NS) twice daily, morning and evening. (See instructions below)   -Empty, measure and record the outputs daily. Subtract the NS flush amount from the total    3) Flush the posterior drain with 10 mL Normal Saline (NS) once a day. (See instructions below)   -Empty, measure and record the outputs daily. Subtract the NS flush amount from the total    Flushing your tube with Saline  1) Clean your work area and wash your hands with soap and water or foam them with antibacterial .   Gather a NS syringe and alcohol pad    2) Take the clear package off the syringe. Remove the cap from the syringe and hold the syringe so it points upwards. Gently push the plunger until the air comes out and you have the right amount of saline.   Replace the syringe cap.     3) Flush the drain  A. Make sure the off arm of the stopcock is pointing to the flush port as shown below.   B. Clean the flush port with an alcohol wipe  C. Screw the tip of the syringe into the flush port        D. Turn the off arm of the stopcock toward the drainage bag (see below)   E. Slowly push in the plunger to inject the saline. If you feel pain or resistance (if it is hard to inject the saline) stop.     F. Turn the off arm of the stopcock back toward the flush port (see below)   G. Unscrew and remove the syringe. If you have a cap put it back on the flush port.       Please call Lisandra GARCIA RN clinician at  or Blanka GARCIA NP at  for questions or concerns about the tube. You can leave a voicemail. We work Monday through Friday [phone].     An alternative number to call  for questions is Interventional Radiology at         Suprapubic wound: twice a day and as needed:  1.  Cleanse wound with Vashe. Then gently dry out wound bed with dry gauze.  2.  Pack wound with Vashe-moistened Mesalt ribbon or Kerlix gauze.    3.  If needed (for skin irritation from tape or drainage), apply skin prep to periwound skin, let dry.   4.  Cover wound with Mepilex or gauze and tape.

## 2023-04-05 NOTE — PROGRESS NOTES
Interventional Radiology Progress Note:  Inpatient at [facility]  Date: April 5, 2023     History: Aicha Draper is a 37 year old female who is s/p C section on 3/5/2023 admitted on 3/29/2023 with surgical site infection, severe anemia, and bilateral lower extremity edema.      She is s/p LLQ and Right posterior drain placement in CT 3/30/23. Drain exchange, reposition and upsize was done in IR 4/3. Allyson is being seen in follow up today     Interval History: Continues to feel better. Most likely going home today.      Physical Exam:   Temp:  [98.4  F (36.9  C)-99.4  F (37.4  C)] 98.4  F (36.9  C)  Pulse:  [85-89] 89  Resp:  [16-18] 16  BP: (119-125)/(77-78) 119/78  SpO2:  [96 %-98 %] 96 %    General:  Stable, pleasant. Better color, in no acute distress. Lying in the bed. Father in the room today  Neuro:  A&O x 4. Moves all extremities equally.  Resp:  Normal respirations on room air. Non labored breathing. Equal air entry B/L      LLQ Drain: Dressing is C/D/I.   -Tube was flushed with a total of 20 mL NS and aspirated what was flushed in without pain or leaking.   -Tube is draining purulent milky fluid.      R posterior Drain: Dressing not observed today   -Tube was not flushed today.   -Tube is draining purulent milky fluid.     Outputs  R Posterior 3/30 110 mL; 3/31 100 mL; 4/1 30 mL;4/2 10 mL; 4/3 15 mL; 4/4 25 mL; 4/5 nothing measured so far today  L  Anterior 3/30  40 mL;  3/31 45 mL;  4/1 35 mL; 4/2 35 mL; 4/3 105 mL; 4/4 240; 4/5 nothing measured so far today    Labs:  Recent Labs   Lab 04/04/23  0741 04/03/23  0748 04/02/23  0713 04/01/23  0755 03/31/23  0733   HGB 8.1* 8.0* 7.9* 8.2* 8.3*   WBC  --   --  9.9 10.5 11.0     Recent Labs   Lab 04/04/23  0741 04/03/23  0748 04/02/23  0713   CR 0.81 0.68 0.72      Recent Labs   Lab 03/30/23  0709 03/29/23  1204   PROTTOTAL 5.3* 5.3*   ALBUMIN 2.3* 2.2*   BILITOTAL 0.2 0.2   ALKPHOS 107* 120*   AST 21 15   ALT 14 14     Assessment: Possible  discharge today. Reviewed drain cares and demonstrated flushing timing and emptying the SYMONE with the patient. The anterior drain will be flushed with 10 mL twice a day and the posterior to be flushed once a day with 10 mL     Plan: Discharge instructions in the AVS  -Review flushing and dressing changes with the patient and her   -Numbers to call for questions in the AVS  -Requested RN to send some stay fix dressings and gauze and tape home with patient for dressing changes.   -No follow up imaging entered at this time. Will follow the patient OP and schedule for a CT when outputs decrease to ~ 10 mL a day or less.     Total time spent on the date of the encounter is 25 minutes, including time spent counseling the patient, performing a medically appropriate evaluation, reviewing prior medical history, ordering medications and tests, documenting clinical information in the medical record, and communication of results.    Thanks [facility] Interventional Radiology CNP ([phone]) (phone [phone])

## 2023-04-05 NOTE — PLAN OF CARE
Shift Note:  [id number]  AXOX4, VSS, on RA, received PRN tylenol for pain management. Has pain on both drain sites and incision site. Independent for transfers. L PIV SL. Purewick in situ. On regular diet. Both drains irrigated with NS per order. Needs to find about who will do the dressing at home. Pt doesn't want to do by her . Plan for discharge tomorrow with improvement.

## 2023-04-06 ENCOUNTER — APPOINTMENT (OUTPATIENT)
Dept: INTERVENTIONAL RADIOLOGY/VASCULAR | Facility: CLINIC | Age: 38
End: 2023-04-06
Attending: NURSE PRACTITIONER
Payer: COMMERCIAL

## 2023-04-06 ENCOUNTER — TELEPHONE (OUTPATIENT)
Dept: OTHER | Facility: CLINIC | Age: 38
End: 2023-04-06
Payer: COMMERCIAL

## 2023-04-06 ENCOUNTER — HOSPITAL ENCOUNTER (OUTPATIENT)
Facility: CLINIC | Age: 38
Discharge: HOME | End: 2023-04-06
Admitting: RADIOLOGY
Payer: COMMERCIAL

## 2023-04-06 VITALS
WEIGHT: 141.5 LBS | SYSTOLIC BLOOD PRESSURE: 125 MMHG | DIASTOLIC BLOOD PRESSURE: 64 MMHG | HEART RATE: 84 BPM | RESPIRATION RATE: 18 BRPM | TEMPERATURE: 97.4 F | OXYGEN SATURATION: 99 % | BODY MASS INDEX: 24.29 KG/M2

## 2023-04-06 LAB — BACTERIA BLD CULT: NO GROWTH

## 2023-04-06 PROCEDURE — 272N000116 HC CATH CR1

## 2023-04-06 PROCEDURE — C1769 GUIDE WIRE: HCPCS

## 2023-04-06 PROCEDURE — 250N000009 HC RX 250: Performed by: NURSE PRACTITIONER

## 2023-04-06 PROCEDURE — 99153 MOD SED SAME PHYS/QHP EA: CPT

## 2023-04-06 PROCEDURE — 99152 MOD SED SAME PHYS/QHP 5/>YRS: CPT

## 2023-04-06 PROCEDURE — 75984 XRAY CONTROL CATHETER CHANGE: CPT

## 2023-04-06 PROCEDURE — 250N000011 HC RX IP 250 OP 636: Performed by: NURSE PRACTITIONER

## 2023-04-06 PROCEDURE — 999N000163 HC STATISTIC SIMPLE TUBE INSERTION/CHARGE, PORT, CATH, FISTULOGRAM

## 2023-04-06 PROCEDURE — C1729 CATH, DRAINAGE: HCPCS

## 2023-04-06 RX ORDER — FENTANYL CITRATE 50 UG/ML
25-50 INJECTION, SOLUTION INTRAMUSCULAR; INTRAVENOUS EVERY 5 MIN PRN
Status: DISCONTINUED | OUTPATIENT
Start: 2023-04-06 | End: 2023-04-06 | Stop reason: HOSPADM

## 2023-04-06 RX ORDER — NALOXONE HYDROCHLORIDE 0.4 MG/ML
0.2 INJECTION, SOLUTION INTRAMUSCULAR; INTRAVENOUS; SUBCUTANEOUS
Status: DISCONTINUED | OUTPATIENT
Start: 2023-04-06 | End: 2023-04-06 | Stop reason: HOSPADM

## 2023-04-06 RX ORDER — ACETAMINOPHEN 325 MG/1
650 TABLET ORAL EVERY 4 HOURS PRN
Status: DISCONTINUED | OUTPATIENT
Start: 2023-04-06 | End: 2023-04-06 | Stop reason: HOSPADM

## 2023-04-06 RX ORDER — NALOXONE HYDROCHLORIDE 0.4 MG/ML
0.4 INJECTION, SOLUTION INTRAMUSCULAR; INTRAVENOUS; SUBCUTANEOUS
Status: DISCONTINUED | OUTPATIENT
Start: 2023-04-06 | End: 2023-04-06 | Stop reason: HOSPADM

## 2023-04-06 RX ADMIN — FENTANYL CITRATE 25 MCG: 50 INJECTION, SOLUTION INTRAMUSCULAR; INTRAVENOUS at 14:14

## 2023-04-06 RX ADMIN — LIDOCAINE HYDROCHLORIDE 20 ML: 10 INJECTION, SOLUTION INFILTRATION; PERINEURAL at 14:07

## 2023-04-06 RX ADMIN — FENTANYL CITRATE 25 MCG: 50 INJECTION, SOLUTION INTRAMUSCULAR; INTRAVENOUS at 14:06

## 2023-04-06 RX ADMIN — FENTANYL CITRATE 50 MCG: 50 INJECTION, SOLUTION INTRAMUSCULAR; INTRAVENOUS at 13:56

## 2023-04-06 ASSESSMENT — ACTIVITIES OF DAILY LIVING (ADL)
ADLS_ACUITY_SCORE: 35
ADLS_ACUITY_SCORE: 35

## 2023-04-06 NOTE — DISCHARGE SUMMARY
Care Suites Discharge Nursing Note    Patient Information  Name: Aicha Draper  Age: 37 year old    Discharge Education:  Discharge instructions reviewed: Yes  Additional education/resources provided: avs  Patient/patient representative verbalizes understanding: Yes  Patient discharging on new medications: No  Medication education completed: N/A    Discharge Plans:   Discharge location: home  Discharge ride contacted: Yes  Approximate discharge time: 1530    Discharge Criteria:  Discharge criteria met and vital signs stable: Yes    Patient Belongs:  Patient belongings returned to patient: Yes    Nelson Pacheco RN

## 2023-04-06 NOTE — TELEPHONE ENCOUNTER
Patient called IR, was at the OB office, and dressings are saturated.  Left drain has very little output today. Flushes easily, spoke with patient regarding function of stopcock and SYMONE   Reviewed with Dr. Wheeler, will plan for drain check with possible exchange.    Lisandra Perdomo RN  IR nurse clinician  [phone]

## 2023-04-06 NOTE — IR NOTE
Interventional Radiology Intra-procedural Nursing Note    Patient Name: Aicha Draper  Medical Record Number: [id number]  Today's Date: April 6, 2023    Procedure: Anterior drain exchange, possible upsize, reposition with Fentanyl  Start time: 1403  End time: 1427  Report provided to: ARAMIS Griffin  Patient depart time and location: 1435 to CS Room 10    Note: Patient entered Interventional Radiology Suite number 1 via cart. Patient awake, alert and oriented. Assisted onto procedural table in supine position. Prepped and draped.  Dr. Wheeler in room. Time out and procedure started.    Procedure well tolerated by patient without complications. Procedure end with debrief by Dr. Wheeler.  Left anterior drain replaced with 18fr drain and sutured at insertion site.  Mepilex, gauze and tegaderm applied to left drain site, dressing is c/d/i.    Administered medication totals:  Lidocaine 1% 20 mL Intradermal  Fentanyl 100 mcg IVP    Last dose of sedation administered at 1414.

## 2023-04-06 NOTE — DISCHARGE INSTRUCTIONS
Abscess drainTube Insertion/Exchange Discharge Instructions     After you go home:    You may resume your normal diet  Drink plenty of fluids, especially water  Have an adult stay with you for 6 hours if you received sedation       For 24 hours - due to the sedation you received:  Relax and take it easy  Do NOT make any important or legal decisions  Do NOT drive or operate machines at home or at work  Do NOT drink alcohol    Care of Tube Site:    For the first 48 hrs, check your puncture site every couple hours while you are awake   Check the tube site twice a day for signs of infection  Keep the dressing around the tube dry  Change the dressing every 2-3 days if it is gauze/tape. If there is a Stay Fix dressing intact - this should be changed weekly.  Change the dressing if it becomes wet or dirty  You may shower but do not get the dressing wet. Place a waterproof cover over the dressing (such as plastic wrap).   No tub baths, whirlpools or swimming until the tube is removed     Activity:    You may go back to normal activity in 24 hours  Wait 48 hours before lifting, straining, exercise or other strenuous activity    Medicines:    You may resume all medications  Resume your Warfarin/Coumadin at your regular dose today. Follow up with your provider to have your INR rechecked  Resume your Platelet Inhibitors and Aspirin tomorrow at your regular dose  For minor pain, you may take Acetaminophen (Tylenol) or Ibuprofen (Advil)               Call the provider who ordered this procedure if:    The site is red, swollen, hot or tender  There is foul-smelling drainage from the tube site  You have pain that is getting worse or that does not improve with pain medication  You have chills or a fever greater than 101 F (38 C)  Fluid stops draining or is leaking around the tube onto your skin  The tube falls out   Any questions or concerns    Call  911 or go to the Emergency Room if you have:    Severe pain or trouble  breathing  Bleeding that you cannot control    Other Instructions:    If the tube falls out - cover the opening with gauze & tape  Record drainage output amounts & bring sheet to return appointments  Take your temperature daily      If you have questions call:          [facility] Radiology Dept @ [phone]      The provider who performed your procedure was _________________.

## 2023-04-06 NOTE — PRE-PROCEDURE
GENERAL PRE-PROCEDURE:   Procedure:  Anterior drain exchange, possible upsize, reposition with Fentanyl  Date/Time:  4/6/2023 1:00 PM    Written consent obtained?: Yes    Risks and benefits: Risks, benefits and alternatives were discussed    Consent given by:  Patient  Patient states understanding of procedure being performed: Yes    Patient's understanding of procedure matches consent: Yes    Procedure consent matches procedure scheduled: Yes    Appropriately NPO:  Yes    Allyson was at the OB clinic today for a wound care dressing change (to the dehisced c section incision) It was noted that more has been draining from the incision than draining out of the drain so she is being seen in IR today for a drain check and possible exchange.    Allyson was seen in Care Suites. Stopcock checked and in proper position for drainage. Anterior drain was flushed with a total of 20 mL NS without pain or leaking but unable to aspirate back. IV will be needed for IV Fentanyl today.     Total time: 25 minutes    Thanks [facility] Interventional Radiology CNP ([phone]) (phone [phone])

## 2023-04-06 NOTE — PROGRESS NOTES
Care Suites Admission Nursing Note    Patient Information  Name: Aicha Draper  Age: 37 year old  Reason for admission: drain exchange  Care Suites arrival time: 1245    Visitor Information  Name: quincy Lassiter   Informed of visitor restrictions: Yes  2 visitor allowed per patient   Visitor must wear a mask    Patient Admission/Assessment   Pre-procedure assessment complete: Yes  If abnormal assessment/labs, provider notified: N/A  NPO: Yes  Medications held per instructions/orders: Yes  Consent: obtained  If applicable, pregnancy test status: denies  Patient oriented to room: Yes  Education/questions answered: Yes  Plan/other: proceed    Discharge Planning  Discharge name/phone number: quincy Lassiter   Overnight post sedation caregiver: Sravani  Discharge location: home    Elias Cochran RN

## 2023-04-06 NOTE — PROGRESS NOTES
1445: Pt returned from IR. Gauze drsg CDI at left lower abdomen for drain exchange. No oozing or hematoma noted. Area soft & flat. Pt denies pain. Pt instructed on activity restrictions while on bedrest. Verbal understanding received from pt.

## 2023-04-07 ENCOUNTER — PREP FOR PROCEDURE (OUTPATIENT)
Dept: SURGERY | Facility: CLINIC | Age: 38
End: 2023-04-07
Payer: COMMERCIAL

## 2023-04-07 ENCOUNTER — ANESTHESIA EVENT (OUTPATIENT)
Dept: SURGERY | Facility: CLINIC | Age: 38
End: 2023-04-07
Payer: COMMERCIAL

## 2023-04-07 ENCOUNTER — TELEPHONE (OUTPATIENT)
Dept: INTERVENTIONAL RADIOLOGY/VASCULAR | Facility: CLINIC | Age: 38
End: 2023-04-07
Payer: COMMERCIAL

## 2023-04-07 ENCOUNTER — PATIENT OUTREACH (OUTPATIENT)
Dept: CARE COORDINATION | Facility: CLINIC | Age: 38
End: 2023-04-07
Payer: COMMERCIAL

## 2023-04-07 DIAGNOSIS — K65.1 INTRA-ABDOMINAL ABSCESS POST-PROCEDURE (H): Primary | ICD-10-CM

## 2023-04-07 DIAGNOSIS — T81.43XA INTRA-ABDOMINAL ABSCESS POST-PROCEDURE (H): Primary | ICD-10-CM

## 2023-04-07 NOTE — TELEPHONE ENCOUNTER
Revieved a call from Allyson today early afternoon that her drain was not working even with flushing, which is not a total surprise as when she was here yesterday for a drain exchange and upsize the purulent material was noted to be cottage cheese type consistency (see Dr Kaufman's note below)    I instructed Allyson to call her OB/Gyn for options moving forward, most likely surgery. I requested she give OB my number if they had any questions.     I received a call from [facility] at 1445 RN Care Coordinator on 8 th floor as she had received a call from OB with questions. I explained the above information and gave her Dr Kaufman's cell number to call with any questions.     INTERVENTIONAL RADIOLOGY ABSCESS TUBE CHECK 2023 2:33 PM     HISTORY:  37-year-old woman with fluid collection underlying  scar. Patient was at OB/GYN's office and found to have drainage from the  wound without output from the left lower quadrant abdominal drain. Request made for evaluation of the drain.     TECHNIQUE: Patient was brought to the IR department and informed consent was reiterated. Patient was placed in a supine position. The existing tube in the left lower quadrant was prepped and draped in standard sterile fashion. 1% lidocaine was used for local anesthesia surrounding the existing drain. Stiff angled Glidewire was advanced through the pigtail drainage catheter and the tube was removed. After additional dilatation with 16 Kinyarwanda and 18 Kinyarwanda dilators, an 18 Kinyarwanda Thal-Quick was placed into the abdominal fluid collection. Attempts were made to aspirate the fluid, though fluid is thick, almost as though a cottage cheese consistency. Tube was placed in the contralateral right flank in the dependent position, which was most  successful for further irrigation. Tube was placed to bulb suction and sutured in place with silk suture. Patient tolerated the procedure well.     Sedation: A moderate level of sedation was  achieved with 100 mcg of IV fentanyl.  Sedation time: 31 minutes  Please note the above medications were administered by the interventional radiology staff under my direct supervision. Patient's vital signs were monitored and remained stable throughout the procedure.    Fluoroscopic time: 2.6 minutes  Air kerma: 20.44 mGy  Contrast: 60 mL of Isovue-300 administered into the abscess cavity, without complication.  Local anesthetic: 20 mL of 1% lidocaine.     FINDINGS: A total of four spot fluoroscopic images obtained. Tube in the patient's right is a right transgluteal drain. Purpose of today's exam is evaluation of left lower quadrant drain. Approximately 100 mL of dilute contrast was administered throughout the exam. Filling defects noted throughout the fluid.                                                                IMPRESSION: Repositioning and upsizing of existing drainage catheter, up to an 18 Tajik Thal-Quick drain. The collection was irrigated.     IMPRESSION: Upsize of existing drain in the left lower quadrant in the subcutaneous fluid collection of the abdomen. 18 Tajik Thal-Quick is in place, largest available. Unfortunately, difficulty in draining the  fluid due to consistency with cottage cheese consistency. If patient has continued output through the  wound, unfortunately, percutaneous options are limited moving forward as may not be able to remove all of the fluid.     RAINER HILL MD     Thanks, [facility] Interventional Radiology CNP ([phone]) (phone [phone])

## 2023-04-07 NOTE — PROGRESS NOTES
"Clinic Care Coordination Contact  Care Team Conversations    Patient was seen in the ED/Care Suites on  with diagnosis of \"drain exchange.\" SW CC reviewed pt chart following discharge. Discharge recommendations include follow-up with OB provider as scheduled. Pt up to date on annual well exam. SW CC reviewed utilization; notable for more recent admission from 3/29- for pelvic and abdominal masses, s/p  on 3/5. Pt scheduled with OB provider on . No SW CC outreach planned.      KAVITHA Moyer, [facility]  , Care Coordination   [facility]   [phone]  [facility] – [email or web address]       "

## 2023-04-07 NOTE — ANESTHESIA PREPROCEDURE EVALUATION
Anesthesia Pre-Procedure Evaluation    Patient: Aicha Draper   MRN: [id number] : 1985        Procedure : Procedure(s):  Diagnostic laparoscopy, laparoscopic lavage with abdominal drain placement possible laparotomy possible wound vac placement          Past Medical History:   Diagnosis Date     Acute congestive heart failure, unspecified heart failure type (H) 3/29/2023     Atypical squamous cells of undetermined significance (ASCUS) on Papanicolaou smear of cervix 2015    ASCUS cyto, repeat pap 1 yr     In vitro fertilization      PCOS (polycystic ovarian syndrome)       Past Surgical History:   Procedure Laterality Date      SECTION N/A 3/5/2023    Procedure:  section;  Surgeon: Guerda Hamm MD;  Location:  L+D     CHOLECYSTECTOMY, LAPOROSCOPIC  2010    Cholecystectomy, Laparoscopic     IR ABSCESS TUBE CHANGE  4/3/2023     SURGICAL HISTORY OF -       STOMACH SURGERY REMOVE HAIRBALL     ZZC APPENDECTOMY      incidental with above      Allergies   Allergen Reactions     No Known Drug Allergies       Social History     Tobacco Use     Smoking status: Never     Smokeless tobacco: Never   Vaping Use     Vaping status: Not on file   Substance Use Topics     Alcohol use: Not Currently     Comment: rarely      Wt Readings from Last 1 Encounters:   23 64.2 kg (141 lb 8 oz)        Anesthesia Evaluation            ROS/MED HX  ENT/Pulmonary:    (-) asthma   Neurologic:  - neg neurologic ROS     Cardiovascular:     (+) -----Previous cardiac testing   Echo: Date: 3/30/23 Results:  Interpretation Summary     1. The left ventricle is normal in structure, function and size. The visual  ejection fraction is estimated at 60%.  2. The right ventricle is normal in structure, function and size.  3. No valve disease.  4. Normal IVC size, reduced collapse.     No previous echo for  comparison.  ______________________________________________________________________________  Stress Test: Date: Results:    ECG Reviewed: Date: Results:    Cath: Date: Results:   (-) PIH   METS/Exercise Tolerance:     Hematologic:       Musculoskeletal:       GI/Hepatic:     (+) GERD,     Renal/Genitourinary: Comment: S/p C/S 3/5/23 now with intraabdominal abscess      Endo:       Psychiatric/Substance Use:       Infectious Disease:       Malignancy:       Other:            Physical Exam    Airway        Mallampati: II   TM distance: > 3 FB   Neck ROM: full   Mouth opening: > 3 cm    Respiratory Devices and Support         Dental       (+) Minor Abnormalities - some fillings, tiny chips      Cardiovascular   cardiovascular exam normal          Pulmonary   pulmonary exam normal                OUTSIDE LABS:  CBC:   Lab Results   Component Value Date    WBC 9.9 04/02/2023    WBC 10.5 04/01/2023    HGB 8.1 (L) 04/04/2023    HGB 8.0 (L) 04/03/2023    HCT 25.3 (L) 04/02/2023    HCT 25.2 (L) 04/01/2023     (H) 04/02/2023     (H) 04/01/2023     BMP:   Lab Results   Component Value Date     04/03/2023     04/02/2023    POTASSIUM 4.6 04/04/2023    POTASSIUM 4.2 04/03/2023    CHLORIDE 104 04/03/2023    CHLORIDE 103 04/02/2023    CO2 21 (L) 04/03/2023    CO2 23 04/02/2023    BUN 5.9 (L) 04/03/2023    BUN 6.1 04/02/2023    CR 0.81 04/04/2023    CR 0.68 04/03/2023     (H) 04/03/2023     (H) 04/02/2023     COAGS: No results found for: PTT, INR, FIBR  POC:   Lab Results   Component Value Date    HCG  01/27/2010     Negative   This test provides a presumptive diagnosis of pregnancy or non-pregnancy. A   confirmed pregnancy diagnosis should only be made by a physician after all   clinical and laboratory findings have been evaluated.     HEPATIC:   Lab Results   Component Value Date    ALBUMIN 2.3 (L) 03/30/2023    PROTTOTAL 5.3 (L) 03/30/2023    ALT 14 03/30/2023    AST 21 03/30/2023    ALKPHOS  107 (H) 03/30/2023    BILITOTAL 0.2 03/30/2023     OTHER:   Lab Results   Component Value Date    DEMETRICE 8.9 04/03/2023    MAG 2.2 04/04/2023    LIPASE 186 05/11/2011    TSH 3.05 09/19/2011    T4 0.98 10/19/2009    SED 6 09/19/2011       Anesthesia Plan    ASA Status:  2   NPO Status:  NPO Appropriate    Anesthesia Type: General.     - Airway: ETT   Induction: Intravenous, Propofol.   Maintenance: Balanced.        Consents    Anesthesia Plan(s) and associated risks, benefits, and realistic alternatives discussed. Questions answered and patient/representative(s) expressed understanding.    - Discussed:     - Discussed with:  Patient         Postoperative Care    Pain management: IV analgesics.   PONV prophylaxis: Ondansetron (or other 5HT-3), Dexamethasone or Solumedrol, Background Propofol Infusion     Comments:                Corby Ghotra DO, DO

## 2023-04-08 ENCOUNTER — HOSPITAL ENCOUNTER (INPATIENT)
Facility: CLINIC | Age: 38
LOS: 4 days | Discharge: HOME | End: 2023-04-12
Attending: SURGERY | Admitting: SURGERY
Payer: COMMERCIAL

## 2023-04-08 ENCOUNTER — APPOINTMENT (OUTPATIENT)
Dept: SURGERY | Facility: PHYSICIAN GROUP | Age: 38
End: 2023-04-08
Payer: COMMERCIAL

## 2023-04-08 ENCOUNTER — ANESTHESIA (OUTPATIENT)
Dept: SURGERY | Facility: CLINIC | Age: 38
End: 2023-04-08
Payer: COMMERCIAL

## 2023-04-08 DIAGNOSIS — B99.9 INTRA-ABDOMINAL INFECTION: Primary | ICD-10-CM

## 2023-04-08 LAB
GRAM STAIN RESULT: ABNORMAL

## 2023-04-08 PROCEDURE — 250N000011 HC RX IP 250 OP 636: Performed by: ANESTHESIOLOGY

## 2023-04-08 PROCEDURE — 250N000013 HC RX MED GY IP 250 OP 250 PS 637: Performed by: STUDENT IN AN ORGANIZED HEALTH CARE EDUCATION/TRAINING PROGRAM

## 2023-04-08 PROCEDURE — 250N000011 HC RX IP 250 OP 636: Performed by: NURSE ANESTHETIST, CERTIFIED REGISTERED

## 2023-04-08 PROCEDURE — 250N000009 HC RX 250

## 2023-04-08 PROCEDURE — 250N000009 HC RX 250: Performed by: SURGERY

## 2023-04-08 PROCEDURE — 250N000011 HC RX IP 250 OP 636

## 2023-04-08 PROCEDURE — 0WCJ4ZZ EXTIRPATION OF MATTER FROM PELVIC CAVITY, PERCUTANEOUS ENDOSCOPIC APPROACH: ICD-10-PCS | Performed by: SURGERY

## 2023-04-08 PROCEDURE — 250N000011 HC RX IP 250 OP 636: Performed by: SURGERY

## 2023-04-08 PROCEDURE — 250N000009 HC RX 250: Performed by: NURSE ANESTHETIST, CERTIFIED REGISTERED

## 2023-04-08 PROCEDURE — 87070 CULTURE OTHR SPECIMN AEROBIC: CPT | Performed by: SURGERY

## 2023-04-08 PROCEDURE — 258N000003 HC RX IP 258 OP 636: Performed by: ANESTHESIOLOGY

## 2023-04-08 PROCEDURE — 258N000001 HC RX 258: Performed by: SURGERY

## 2023-04-08 PROCEDURE — 120N000001 HC R&B MED SURG/OB

## 2023-04-08 PROCEDURE — 250N000009 HC RX 250: Performed by: ANESTHESIOLOGY

## 2023-04-08 PROCEDURE — 370N000017 HC ANESTHESIA TECHNICAL FEE, PER MIN: Performed by: SURGERY

## 2023-04-08 PROCEDURE — 710N000010 HC RECOVERY PHASE 1, LEVEL 2, PER MIN: Performed by: SURGERY

## 2023-04-08 PROCEDURE — 0W9F40Z DRAINAGE OF ABDOMINAL WALL WITH DRAINAGE DEVICE, PERCUTANEOUS ENDOSCOPIC APPROACH: ICD-10-PCS | Performed by: SURGERY

## 2023-04-08 PROCEDURE — 87205 SMEAR GRAM STAIN: CPT | Performed by: SURGERY

## 2023-04-08 PROCEDURE — 360N000076 HC SURGERY LEVEL 3, PER MIN: Performed by: SURGERY

## 2023-04-08 PROCEDURE — 49320 DIAG LAPARO SEPARATE PROC: CPT | Performed by: SURGERY

## 2023-04-08 PROCEDURE — 250N000013 HC RX MED GY IP 250 OP 250 PS 637: Performed by: SURGERY

## 2023-04-08 PROCEDURE — 258N000003 HC RX IP 258 OP 636: Performed by: STUDENT IN AN ORGANIZED HEALTH CARE EDUCATION/TRAINING PROGRAM

## 2023-04-08 PROCEDURE — 250N000011 HC RX IP 250 OP 636: Performed by: STUDENT IN AN ORGANIZED HEALTH CARE EDUCATION/TRAINING PROGRAM

## 2023-04-08 PROCEDURE — 999N000141 HC STATISTIC PRE-PROCEDURE NURSING ASSESSMENT: Performed by: SURGERY

## 2023-04-08 PROCEDURE — 272N000001 HC OR GENERAL SUPPLY STERILE: Performed by: SURGERY

## 2023-04-08 PROCEDURE — 10180 I&D COMPLEX PO WOUND INFCTJ: CPT | Mod: 51 | Performed by: SURGERY

## 2023-04-08 PROCEDURE — 87075 CULTR BACTERIA EXCEPT BLOOD: CPT | Performed by: SURGERY

## 2023-04-08 PROCEDURE — 250N000025 HC SEVOFLURANE, PER MIN: Performed by: SURGERY

## 2023-04-08 PROCEDURE — 0HD7XZZ EXTRACTION OF ABDOMEN SKIN, EXTERNAL APPROACH: ICD-10-PCS | Performed by: SURGERY

## 2023-04-08 RX ORDER — HYDROMORPHONE HCL IN WATER/PF 6 MG/30 ML
0.4 PATIENT CONTROLLED ANALGESIA SYRINGE INTRAVENOUS
Status: DISCONTINUED | OUTPATIENT
Start: 2023-04-08 | End: 2023-04-12 | Stop reason: HOSPADM

## 2023-04-08 RX ORDER — FENTANYL CITRATE 0.05 MG/ML
25 INJECTION, SOLUTION INTRAMUSCULAR; INTRAVENOUS EVERY 5 MIN PRN
Status: DISCONTINUED | OUTPATIENT
Start: 2023-04-08 | End: 2023-04-08 | Stop reason: HOSPADM

## 2023-04-08 RX ORDER — ONDANSETRON 4 MG/1
4 TABLET, ORALLY DISINTEGRATING ORAL EVERY 6 HOURS PRN
Status: DISCONTINUED | OUTPATIENT
Start: 2023-04-08 | End: 2023-04-08

## 2023-04-08 RX ORDER — LIDOCAINE 40 MG/G
CREAM TOPICAL
Status: DISCONTINUED | OUTPATIENT
Start: 2023-04-08 | End: 2023-04-08 | Stop reason: HOSPADM

## 2023-04-08 RX ORDER — PIPERACILLIN SODIUM, TAZOBACTAM SODIUM 3; .375 G/15ML; G/15ML
3.38 INJECTION, POWDER, LYOPHILIZED, FOR SOLUTION INTRAVENOUS EVERY 6 HOURS
Status: DISCONTINUED | OUTPATIENT
Start: 2023-04-08 | End: 2023-04-10

## 2023-04-08 RX ORDER — ONDANSETRON 2 MG/ML
4 INJECTION INTRAMUSCULAR; INTRAVENOUS EVERY 6 HOURS PRN
Status: DISCONTINUED | OUTPATIENT
Start: 2023-04-08 | End: 2023-04-12 | Stop reason: HOSPADM

## 2023-04-08 RX ORDER — NALOXONE HYDROCHLORIDE 0.4 MG/ML
0.4 INJECTION, SOLUTION INTRAMUSCULAR; INTRAVENOUS; SUBCUTANEOUS
Status: DISCONTINUED | OUTPATIENT
Start: 2023-04-08 | End: 2023-04-12 | Stop reason: HOSPADM

## 2023-04-08 RX ORDER — ONDANSETRON 2 MG/ML
4 INJECTION INTRAMUSCULAR; INTRAVENOUS EVERY 6 HOURS PRN
Status: DISCONTINUED | OUTPATIENT
Start: 2023-04-08 | End: 2023-04-08

## 2023-04-08 RX ORDER — FENTANYL CITRATE 0.05 MG/ML
50 INJECTION, SOLUTION INTRAMUSCULAR; INTRAVENOUS EVERY 5 MIN PRN
Status: DISCONTINUED | OUTPATIENT
Start: 2023-04-08 | End: 2023-04-08 | Stop reason: HOSPADM

## 2023-04-08 RX ORDER — NALOXONE HYDROCHLORIDE 0.4 MG/ML
0.2 INJECTION, SOLUTION INTRAMUSCULAR; INTRAVENOUS; SUBCUTANEOUS
Status: DISCONTINUED | OUTPATIENT
Start: 2023-04-08 | End: 2023-04-12 | Stop reason: HOSPADM

## 2023-04-08 RX ORDER — CEFAZOLIN SODIUM/WATER 2 G/20 ML
2 SYRINGE (ML) INTRAVENOUS SEE ADMIN INSTRUCTIONS
Status: DISCONTINUED | OUTPATIENT
Start: 2023-04-08 | End: 2023-04-08 | Stop reason: HOSPADM

## 2023-04-08 RX ORDER — LIDOCAINE HYDROCHLORIDE 20 MG/ML
INJECTION, SOLUTION INFILTRATION; PERINEURAL PRN
Status: DISCONTINUED | OUTPATIENT
Start: 2023-04-08 | End: 2023-04-08

## 2023-04-08 RX ORDER — SODIUM CHLORIDE, SODIUM LACTATE, POTASSIUM CHLORIDE, CALCIUM CHLORIDE 600; 310; 30; 20 MG/100ML; MG/100ML; MG/100ML; MG/100ML
INJECTION, SOLUTION INTRAVENOUS CONTINUOUS
Status: DISCONTINUED | OUTPATIENT
Start: 2023-04-08 | End: 2023-04-08 | Stop reason: HOSPADM

## 2023-04-08 RX ORDER — AMOXICILLIN 250 MG
2 CAPSULE ORAL DAILY
Status: DISCONTINUED | OUTPATIENT
Start: 2023-04-08 | End: 2023-04-12 | Stop reason: HOSPADM

## 2023-04-08 RX ORDER — OXYCODONE HYDROCHLORIDE 5 MG/1
10 TABLET ORAL EVERY 4 HOURS PRN
Status: DISCONTINUED | OUTPATIENT
Start: 2023-04-08 | End: 2023-04-12 | Stop reason: HOSPADM

## 2023-04-08 RX ORDER — BUPIVACAINE HYDROCHLORIDE AND EPINEPHRINE 2.5; 5 MG/ML; UG/ML
INJECTION, SOLUTION EPIDURAL; INFILTRATION; INTRACAUDAL; PERINEURAL PRN
Status: DISCONTINUED | OUTPATIENT
Start: 2023-04-08 | End: 2023-04-12

## 2023-04-08 RX ORDER — CALCIUM CARBONATE 500 MG/1
1000 TABLET, CHEWABLE ORAL 4 TIMES DAILY PRN
Status: DISCONTINUED | OUTPATIENT
Start: 2023-04-08 | End: 2023-04-12 | Stop reason: HOSPADM

## 2023-04-08 RX ORDER — FERROUS SULFATE 325(65) MG
325 TABLET ORAL 2 TIMES DAILY
Status: DISCONTINUED | OUTPATIENT
Start: 2023-04-08 | End: 2023-04-12 | Stop reason: HOSPADM

## 2023-04-08 RX ORDER — PROCHLORPERAZINE 25 MG
25 SUPPOSITORY, RECTAL RECTAL EVERY 12 HOURS PRN
Status: DISCONTINUED | OUTPATIENT
Start: 2023-04-08 | End: 2023-04-12 | Stop reason: HOSPADM

## 2023-04-08 RX ORDER — DEXAMETHASONE SODIUM PHOSPHATE 4 MG/ML
INJECTION, SOLUTION INTRA-ARTICULAR; INTRALESIONAL; INTRAMUSCULAR; INTRAVENOUS; SOFT TISSUE PRN
Status: DISCONTINUED | OUTPATIENT
Start: 2023-04-08 | End: 2023-04-08

## 2023-04-08 RX ORDER — MAGNESIUM HYDROXIDE 1200 MG/15ML
LIQUID ORAL PRN
Status: DISCONTINUED | OUTPATIENT
Start: 2023-04-08 | End: 2023-04-12

## 2023-04-08 RX ORDER — PRENATAL VIT/IRON FUM/FOLIC AC 27MG-0.8MG
1 TABLET ORAL DAILY
Status: DISCONTINUED | OUTPATIENT
Start: 2023-04-08 | End: 2023-04-12 | Stop reason: HOSPADM

## 2023-04-08 RX ORDER — ONDANSETRON 4 MG/1
4 TABLET, ORALLY DISINTEGRATING ORAL EVERY 30 MIN PRN
Status: DISCONTINUED | OUTPATIENT
Start: 2023-04-08 | End: 2023-04-08 | Stop reason: HOSPADM

## 2023-04-08 RX ORDER — ACETAMINOPHEN 325 MG/1
975 TABLET ORAL EVERY 8 HOURS
Status: COMPLETED | OUTPATIENT
Start: 2023-04-08 | End: 2023-04-11

## 2023-04-08 RX ORDER — CEFAZOLIN SODIUM/WATER 2 G/20 ML
2 SYRINGE (ML) INTRAVENOUS
Status: COMPLETED | OUTPATIENT
Start: 2023-04-08 | End: 2023-04-08

## 2023-04-08 RX ORDER — ONDANSETRON 2 MG/ML
4 INJECTION INTRAMUSCULAR; INTRAVENOUS EVERY 30 MIN PRN
Status: DISCONTINUED | OUTPATIENT
Start: 2023-04-08 | End: 2023-04-08 | Stop reason: HOSPADM

## 2023-04-08 RX ORDER — HYDROMORPHONE HCL IN WATER/PF 6 MG/30 ML
0.2 PATIENT CONTROLLED ANALGESIA SYRINGE INTRAVENOUS EVERY 5 MIN PRN
Status: DISCONTINUED | OUTPATIENT
Start: 2023-04-08 | End: 2023-04-08 | Stop reason: HOSPADM

## 2023-04-08 RX ORDER — HYDROMORPHONE HCL IN WATER/PF 6 MG/30 ML
0.2 PATIENT CONTROLLED ANALGESIA SYRINGE INTRAVENOUS
Status: DISCONTINUED | OUTPATIENT
Start: 2023-04-08 | End: 2023-04-12 | Stop reason: HOSPADM

## 2023-04-08 RX ORDER — PIPERACILLIN SODIUM, TAZOBACTAM SODIUM 3; .375 G/15ML; G/15ML
3.38 INJECTION, POWDER, LYOPHILIZED, FOR SOLUTION INTRAVENOUS EVERY 6 HOURS
Status: DISCONTINUED | OUTPATIENT
Start: 2023-04-08 | End: 2023-04-08

## 2023-04-08 RX ORDER — ONDANSETRON 2 MG/ML
INJECTION INTRAMUSCULAR; INTRAVENOUS PRN
Status: DISCONTINUED | OUTPATIENT
Start: 2023-04-08 | End: 2023-04-08

## 2023-04-08 RX ORDER — BISACODYL 10 MG
10 SUPPOSITORY, RECTAL RECTAL DAILY PRN
Status: DISCONTINUED | OUTPATIENT
Start: 2023-04-08 | End: 2023-04-11

## 2023-04-08 RX ORDER — PANTOPRAZOLE SODIUM 40 MG/1
40 TABLET, DELAYED RELEASE ORAL EVERY EVENING
Status: DISCONTINUED | OUTPATIENT
Start: 2023-04-08 | End: 2023-04-12 | Stop reason: HOSPADM

## 2023-04-08 RX ORDER — LIDOCAINE 40 MG/G
CREAM TOPICAL
Status: DISCONTINUED | OUTPATIENT
Start: 2023-04-08 | End: 2023-04-12 | Stop reason: HOSPADM

## 2023-04-08 RX ORDER — DEXMEDETOMIDINE HYDROCHLORIDE 4 UG/ML
INJECTION, SOLUTION INTRAVENOUS PRN
Status: DISCONTINUED | OUTPATIENT
Start: 2023-04-08 | End: 2023-04-08

## 2023-04-08 RX ORDER — FENTANYL CITRATE 50 UG/ML
INJECTION, SOLUTION INTRAMUSCULAR; INTRAVENOUS PRN
Status: DISCONTINUED | OUTPATIENT
Start: 2023-04-08 | End: 2023-04-08

## 2023-04-08 RX ORDER — ONDANSETRON 4 MG/1
4 TABLET, ORALLY DISINTEGRATING ORAL EVERY 6 HOURS PRN
Status: DISCONTINUED | OUTPATIENT
Start: 2023-04-08 | End: 2023-04-12 | Stop reason: HOSPADM

## 2023-04-08 RX ORDER — HYDROMORPHONE HYDROCHLORIDE 1 MG/ML
INJECTION, SOLUTION INTRAMUSCULAR; INTRAVENOUS; SUBCUTANEOUS PRN
Status: DISCONTINUED | OUTPATIENT
Start: 2023-04-08 | End: 2023-04-08

## 2023-04-08 RX ORDER — OXYCODONE HYDROCHLORIDE 5 MG/1
5 TABLET ORAL EVERY 4 HOURS PRN
Status: DISCONTINUED | OUTPATIENT
Start: 2023-04-08 | End: 2023-04-12 | Stop reason: HOSPADM

## 2023-04-08 RX ORDER — SODIUM CHLORIDE, SODIUM LACTATE, POTASSIUM CHLORIDE, CALCIUM CHLORIDE 600; 310; 30; 20 MG/100ML; MG/100ML; MG/100ML; MG/100ML
INJECTION, SOLUTION INTRAVENOUS CONTINUOUS
Status: DISCONTINUED | OUTPATIENT
Start: 2023-04-08 | End: 2023-04-09

## 2023-04-08 RX ORDER — ASCORBIC ACID 500 MG
1000 TABLET ORAL DAILY
Status: DISCONTINUED | OUTPATIENT
Start: 2023-04-08 | End: 2023-04-12 | Stop reason: HOSPADM

## 2023-04-08 RX ORDER — PROCHLORPERAZINE MALEATE 10 MG
10 TABLET ORAL EVERY 6 HOURS PRN
Status: DISCONTINUED | OUTPATIENT
Start: 2023-04-08 | End: 2023-04-12 | Stop reason: HOSPADM

## 2023-04-08 RX ORDER — PROPOFOL 10 MG/ML
INJECTION, EMULSION INTRAVENOUS CONTINUOUS PRN
Status: DISCONTINUED | OUTPATIENT
Start: 2023-04-08 | End: 2023-04-08

## 2023-04-08 RX ORDER — HYDROMORPHONE HCL IN WATER/PF 6 MG/30 ML
0.4 PATIENT CONTROLLED ANALGESIA SYRINGE INTRAVENOUS EVERY 5 MIN PRN
Status: DISCONTINUED | OUTPATIENT
Start: 2023-04-08 | End: 2023-04-08 | Stop reason: HOSPADM

## 2023-04-08 RX ORDER — SCOLOPAMINE TRANSDERMAL SYSTEM 1 MG/1
1 PATCH, EXTENDED RELEASE TRANSDERMAL ONCE
Status: COMPLETED | OUTPATIENT
Start: 2023-04-08 | End: 2023-04-09

## 2023-04-08 RX ORDER — ACETAMINOPHEN 325 MG/1
650 TABLET ORAL EVERY 4 HOURS PRN
Status: DISCONTINUED | OUTPATIENT
Start: 2023-04-11 | End: 2023-04-08

## 2023-04-08 RX ORDER — PROPOFOL 10 MG/ML
INJECTION, EMULSION INTRAVENOUS PRN
Status: DISCONTINUED | OUTPATIENT
Start: 2023-04-08 | End: 2023-04-08

## 2023-04-08 RX ORDER — CHOLECALCIFEROL (VITAMIN D3) 50 MCG
50 TABLET ORAL DAILY
Status: DISCONTINUED | OUTPATIENT
Start: 2023-04-08 | End: 2023-04-12 | Stop reason: HOSPADM

## 2023-04-08 RX ORDER — POLYETHYLENE GLYCOL 3350 17 G/17G
17 POWDER, FOR SOLUTION ORAL DAILY
Status: DISCONTINUED | OUTPATIENT
Start: 2023-04-09 | End: 2023-04-09

## 2023-04-08 RX ADMIN — PIPERACILLIN AND TAZOBACTAM 3.38 G: 3; .375 INJECTION, POWDER, FOR SOLUTION INTRAVENOUS at 16:57

## 2023-04-08 RX ADMIN — Medication 2 G: at 11:27

## 2023-04-08 RX ADMIN — FENTANYL CITRATE 50 MCG: 50 INJECTION, SOLUTION INTRAMUSCULAR; INTRAVENOUS at 11:32

## 2023-04-08 RX ADMIN — OXYCODONE HYDROCHLORIDE AND ACETAMINOPHEN 1000 MG: 500 TABLET ORAL at 16:56

## 2023-04-08 RX ADMIN — PRENATAL VITAMINS-IRON FUMARATE 27 MG IRON-FOLIC ACID 0.8 MG TABLET 1 TABLET: at 18:43

## 2023-04-08 RX ADMIN — HYDROMORPHONE HYDROCHLORIDE 0.5 MG: 1 INJECTION, SOLUTION INTRAMUSCULAR; INTRAVENOUS; SUBCUTANEOUS at 12:35

## 2023-04-08 RX ADMIN — DEXMEDETOMIDINE HYDROCHLORIDE 12 MCG: 200 INJECTION INTRAVENOUS at 12:02

## 2023-04-08 RX ADMIN — PROPOFOL 30 MCG/KG/MIN: 10 INJECTION, EMULSION INTRAVENOUS at 11:47

## 2023-04-08 RX ADMIN — MIDAZOLAM 2 MG: 1 INJECTION INTRAMUSCULAR; INTRAVENOUS at 11:24

## 2023-04-08 RX ADMIN — SUGAMMADEX 200 MG: 100 INJECTION, SOLUTION INTRAVENOUS at 13:05

## 2023-04-08 RX ADMIN — DEXAMETHASONE SODIUM PHOSPHATE 8 MG: 4 INJECTION, SOLUTION INTRA-ARTICULAR; INTRALESIONAL; INTRAMUSCULAR; INTRAVENOUS; SOFT TISSUE at 11:45

## 2023-04-08 RX ADMIN — SODIUM CHLORIDE, POTASSIUM CHLORIDE, SODIUM LACTATE AND CALCIUM CHLORIDE: 600; 310; 30; 20 INJECTION, SOLUTION INTRAVENOUS at 16:57

## 2023-04-08 RX ADMIN — ONDANSETRON 4 MG: 2 INJECTION INTRAMUSCULAR; INTRAVENOUS at 13:01

## 2023-04-08 RX ADMIN — OXYCODONE HYDROCHLORIDE 10 MG: 5 TABLET ORAL at 21:10

## 2023-04-08 RX ADMIN — PHENYLEPHRINE HYDROCHLORIDE 100 MCG: 10 INJECTION INTRAVENOUS at 11:32

## 2023-04-08 RX ADMIN — PIPERACILLIN AND TAZOBACTAM 3.38 G: 3; .375 INJECTION, POWDER, FOR SOLUTION INTRAVENOUS at 22:05

## 2023-04-08 RX ADMIN — Medication 50 MCG: at 16:57

## 2023-04-08 RX ADMIN — FENTANYL CITRATE 50 MCG: 50 INJECTION, SOLUTION INTRAMUSCULAR; INTRAVENOUS at 13:50

## 2023-04-08 RX ADMIN — PANTOPRAZOLE SODIUM 40 MG: 40 TABLET, DELAYED RELEASE ORAL at 21:10

## 2023-04-08 RX ADMIN — LIDOCAINE HYDROCHLORIDE 80 MG: 20 INJECTION, SOLUTION INFILTRATION; PERINEURAL at 11:32

## 2023-04-08 RX ADMIN — ROCURONIUM BROMIDE 40 MG: 50 INJECTION, SOLUTION INTRAVENOUS at 11:32

## 2023-04-08 RX ADMIN — ROCURONIUM BROMIDE 10 MG: 50 INJECTION, SOLUTION INTRAVENOUS at 11:45

## 2023-04-08 RX ADMIN — FENTANYL CITRATE 50 MCG: 50 INJECTION, SOLUTION INTRAMUSCULAR; INTRAVENOUS at 11:53

## 2023-04-08 RX ADMIN — FENTANYL CITRATE 50 MCG: 50 INJECTION, SOLUTION INTRAMUSCULAR; INTRAVENOUS at 14:08

## 2023-04-08 RX ADMIN — FERROUS SULFATE TAB 325 MG (65 MG ELEMENTAL FE) 325 MG: 325 (65 FE) TAB at 21:10

## 2023-04-08 RX ADMIN — ROCURONIUM BROMIDE 20 MG: 50 INJECTION, SOLUTION INTRAVENOUS at 11:54

## 2023-04-08 RX ADMIN — ACETAMINOPHEN 975 MG: 325 TABLET, FILM COATED ORAL at 16:56

## 2023-04-08 RX ADMIN — SENNOSIDES AND DOCUSATE SODIUM 2 TABLET: 50; 8.6 TABLET ORAL at 16:56

## 2023-04-08 RX ADMIN — OXYCODONE HYDROCHLORIDE 10 MG: 5 TABLET ORAL at 16:56

## 2023-04-08 RX ADMIN — DEXMEDETOMIDINE HYDROCHLORIDE 8 MCG: 200 INJECTION INTRAVENOUS at 12:50

## 2023-04-08 RX ADMIN — SODIUM CHLORIDE, POTASSIUM CHLORIDE, SODIUM LACTATE AND CALCIUM CHLORIDE: 600; 310; 30; 20 INJECTION, SOLUTION INTRAVENOUS at 12:11

## 2023-04-08 RX ADMIN — ONDANSETRON 4 MG: 4 TABLET, ORALLY DISINTEGRATING ORAL at 16:56

## 2023-04-08 RX ADMIN — SCOPALAMINE 1 PATCH: 1 PATCH, EXTENDED RELEASE TRANSDERMAL at 09:44

## 2023-04-08 RX ADMIN — SODIUM CHLORIDE, POTASSIUM CHLORIDE, SODIUM LACTATE AND CALCIUM CHLORIDE: 600; 310; 30; 20 INJECTION, SOLUTION INTRAVENOUS at 09:38

## 2023-04-08 RX ADMIN — HYDROMORPHONE HYDROCHLORIDE 0.2 MG: 0.2 INJECTION, SOLUTION INTRAMUSCULAR; INTRAVENOUS; SUBCUTANEOUS at 14:35

## 2023-04-08 RX ADMIN — PROPOFOL 180 MG: 10 INJECTION, EMULSION INTRAVENOUS at 11:32

## 2023-04-08 ASSESSMENT — ACTIVITIES OF DAILY LIVING (ADL)
ADLS_ACUITY_SCORE: 35
ADLS_ACUITY_SCORE: 35
ADLS_ACUITY_SCORE: 20
ADLS_ACUITY_SCORE: 20
ADLS_ACUITY_SCORE: 35
ADLS_ACUITY_SCORE: 35
ADLS_ACUITY_SCORE: 20
ADLS_ACUITY_SCORE: 35

## 2023-04-08 NOTE — ANESTHESIA CARE TRANSFER NOTE
Patient: Aicha Draepr    Procedure: Procedure(s):  Diagnostic laparoscopy, laparoscopic lavage with abdominal drain placement x2  wound vac placement         Diagnosis: Intra-abdominal abscess post-procedure [T81.43XA]  Diagnosis Additional Information: No value filed.    Anesthesia Type:   General     Note:    Oropharynx: oropharynx clear of all foreign objects and spontaneously breathing  Level of Consciousness: drowsy  Oxygen Supplementation: face mask  Level of Supplemental Oxygen (L/min / FiO2): 6  Independent Airway: airway patency satisfactory and stable  Dentition: dentition unchanged  Vital Signs Stable: post-procedure vital signs reviewed and stable  Report to RN Given: handoff report given  Patient transferred to: PACU    Handoff Report: Identifed the Patient, Identified the Reponsible Provider, Reviewed the pertinent medical history, Discussed the surgical course, Reviewed Intra-OP anesthesia mangement and issues during anesthesia, Set expectations for post-procedure period and Allowed opportunity for questions and acknowledgement of understanding      Vitals:  Vitals Value Taken Time   /69 04/08/23 1320   Temp 36.4  C (97.6  F) 04/08/23 1318   Pulse 79 04/08/23 1324   Resp 19 04/08/23 1324   SpO2 100 % 04/08/23 1324   Vitals shown include unvalidated device data.    Electronically Signed By: ALLYSON Bellamy CRNA  April 8, 2023  1:25 PM

## 2023-04-08 NOTE — OP NOTE
General Surgery Operative Note    PREOPERATIVE DIAGNOSIS: purulent peritonitis    POSTOPERATIVE DIAGNOSIS: same, cultures pending    PROCEDURE:   1. Diagnostic laparoscopy  2. Laparoscopic lavage with drain placement  3. Incision and drainage of  incision  4. Wound vac placement to  incision    SURGEON:  Autumn Arteaga MD    ASSISTANT:  Jenise Antunez MD [facility] Resident, Dr. Guerda Nuñez was also present during a majority of the procedure to evaluate the uterus intraoperatively.   The PA s assistance was medically necessary to provide adequate exposure in the operating field, maintain hemostasis, cutting suture, clamping and ligating bleeding vessels, and visualization of anatomic structures throughout the surgical procedure.       ANESTHESIA:  General.    BLOOD LOSS: 25ml    FINDINGS: Significant adhesions to the anterior abdominal wall and approximately 1 L of thick purulent fluid in the pelvis which was evacuated and cultures obtained.  Unable to access the cul-de-sac due to significant adhesions in the pelvis posteriorly and posterior gluteal drain left in place.  Anterior IR drain removed and 2 new SYMONE drains placed.  Wound VAC placed to open  incision after further I&D    INDICATIONS:   Allyson is a 37-year-old female who underwent prolonged labor and eventual  approximately 4 weeks ago (3/5/3023).  She had purulent fluid draining from her  incision on 3/30 and underwent I&D in clinic.  She had bilateral lower extremity edema and was admitted to the hospital on 3/29/2023.  She had a CT chest abdomen pelvis which revealed loculated fluid collections in the pelvic cul-de-sac and lower abdomen and then underwent IR drainage of this on 3/30/2023 with a gluteal drain and anterior drain.  She had a follow-up CT on 4/3/2023 which revealed decreased size of the pelvic cul-de-sac fluid collection but a thin second lower abdominal collection as well with a drain in place.  She had  another CT on 4/3/2023 and had 1 drain removed with a new drain placed.  She had another IR abscess tube check on 2023 with upsizing of the existing drain in the left lower quadrant to an 18 Tamazight drain and it it was felt that the fluid would not drain adequately given the thick consistency.  The patient had discharge from the hospital on 2023.  While inpatient she had been followed by infectious disease due to the polymicrobial nature of the infection.  She was discharged on Augmentin for a total of 2-week course.  I was called by Dr. Hamm as the patient was seen in clinic yesterday with ongoing purulent drainage from her anterior  incision and recommended laparoscopic abdominal washout with drain placement, possible laparotomy and possible wound VAC placement.  The patient presented this morning for this procedure.  We discussed the risks, benefits, indications and alternatives and she would like to proceed.    DETAILS OF PROCEDURE: The patient was brought to the operating room per Anesthesia, placed in supine position, and intubated without difficulty. Perioperative antibiotics were administered. The abdomen was prepped and draped in standard fashion. A linton catheter was placed using sterile technique.     A Surgical timeout was then performed, verifying the correct surgeon, site, procedure, and patient and all in the room were in agreement.     We began by making a small incision in the left upper quadrant.  A 5 mm 0 degree laparoscope was then used to enter the abdomen using Visiport technique.  Upon initial intra-abdominal entrance insufflation was connected and flowed freely.  Insufflation pressure was set to 15 mmHg which the patient tolerated well.  Upon entrance into the abdomen I noted that we were under the omentum with the laparoscope.  We were able to see fairly well that the colon was tented up to the abdominal wall and then carefully evaluated and were able to traverse through  filmy adhesions in the left upper quadrant to be again to create a plane between the omentum and the anterior abdominal peritoneum.  I carefully worked with the laparoscope creating this plane until there was enough space on the left lateral abdominal wall to place an additional 5 mm port under direct visualization.  I was then able to use an atraumatic grasper to continue to take down omental adhesions to the anterior abdominal wall.  In the upper abdomen these adhesions were filmy and came down easily.  Once we were at the umbilicus the adhesions were more dense but I was able to use the suction irrigation device to bluntly dissect to create a space and entered into a large abscess pocket across the entire lower abdominal wall.  There was extremely thick purulent fluid within this space.  We continued working and identified omentum traveling into the peritoneum at the level of the umbilicus.  I was able to free the omentum which allowed us to continue visualization lower on the abdominal wall.  We then noted gas leaking from the open wound at her  site and identified a 2 cm separation in the fascia with a small amount of overlying tissue intact at this area.  The uterus was identified and was extremely friable in nature given it was abutting this large abscess cavity.  Dr. Nuñez evaluated the uterus and felt it appeared intact. All of the purulent fluid was either aspirated or removed with atraumatic graspers via our port sites.  On the left lateral abdominal wall there was another area of abscess which was all aspirated.  We identified the internal aspect of the IR placed drain and elected to remove this drain.  It was very difficult to identify bowel relative to the uterus given the friability of all of the tissue.  There was a fibrinous rind overlying all of the bowel near this large abscess cavity which occupied all of the lower abdomen just below the umbilicus.  I felt that the risk of bowel injury  was very high if we were to try to continue freeing up posteriorly to drain the abscess in the pelvis and therefore elected to leave this area alone and left the IR placed transgluteal drain in place.  We then irrigated with 5 L of warm sterile saline and aspirated all of our irrigant.  We made a small incision in the right lower abdominal wall and placed a 19 Beninese round SYMONE drain through this and positioned it in the pelvis.  I placed a 15 Beninese round SYMONE drain via her previous IR drain site on the left lateral abdominal wall and both drains were secured to the skin using 3-0 nylon sutures.  The 5 mm ports were removed under direct visualization.  There was no bleeding.  The skin incisions were closed with 4-0 Monocryl sutures.    We then directed our attention to the  incision.  This incision was previously opened 5 cm in the midportion.  With pressure superiorly there was purulent fluid in the field.  And therefore elected to further open the incision using a 15 blade both medially and laterally.  We irrigated copiously until there was no further purulent fluid.  We then placed a wound VAC over the site.  Adaptic gauze was placed at the base of the wound and versa foam white gauze placed over this with a black GranuFoam gauze on top.  Wound VAC tape was applied over the GranuFoam and a Kassie pad placed and connected to suction which was set at 75 mmHg.  There was an excellent seal obtained.    The patient was awakened from anesthesia and transferred to the PACU in stable condition.  All instrument, needle and sponge counts were correct at the conclusion of the procedure.  Autumn Arteaga MD     a

## 2023-04-08 NOTE — ANESTHESIA POSTPROCEDURE EVALUATION
Patient: Aicha Draper    Procedure: Procedure(s):  Diagnostic laparoscopy, laparoscopic lavage with abdominal drain placement x2  wound vac placement         Anesthesia Type:  General    Note:  Disposition: Inpatient   Postop Pain Control: Uneventful            Sign Out: Well controlled pain   PONV: No   Neuro/Psych: Uneventful            Sign Out: Acceptable/Baseline neuro status   Airway/Respiratory: Uneventful            Sign Out: Acceptable/Baseline resp. status   CV/Hemodynamics: Uneventful            Sign Out: Acceptable CV status; No obvious hypovolemia; No obvious fluid overload   Other NRE: NONE   DID A NON-ROUTINE EVENT OCCUR?            Last vitals:  Vitals Value Taken Time   /69 04/08/23 1410   Temp     Pulse 80 04/08/23 1413   Resp 13 04/08/23 1413   SpO2 99 % 04/08/23 1413   Vitals shown include unvalidated device data.    Electronically Signed By: Corby Ghotra DO, DO  April 8, 2023  2:14 PM

## 2023-04-08 NOTE — BRIEF OP NOTE
[facility]    Brief Operative Note    Pre-operative diagnosis: Intra-abdominal abscess post-procedure [T81.43XA]  Post-operative diagnosis Same as pre-operative diagnosis    Procedure: Procedure(s):  Diagnostic laparoscopy, laparoscopic lavage with abdominal drain placement x2  wound vac placement    Surgeon: Surgeon(s) and Role:     * Autumn Arteaga MD - Primary     * Guerda Hamm MD - Assisting     * Jenise Antunez MD - Assisting  Anesthesia: General   Estimated Blood Loss: 25 mL from 4/8/2023 11:24 AM to 4/8/2023  1:15 PM      Drains: Jeff-Ferrera x2  Specimens:   ID Type Source Tests Collected by Time Destination   A : abdominal abscess Abscess Abdomen ANAEROBIC BACTERIAL CULTURE ROUTINE, GRAM STAIN, AEROBIC BACTERIAL CULTURE ROUTINE Autumn Arteaga MD 4/8/2023 12:10 PM      Findings:   Diffuse intraabdominal adhesions requiring extensive lysis. Multiple contained abscesses draining aurelio pus. Fascial defect over low abdominal incision draining aurelio pus. Existing LLQ IR drain removed with placement of RLQ and LLQ SYMONE drain. 5L saline irrigation..  Complications: None.  Implants: * No implants in log *

## 2023-04-08 NOTE — INTERVAL H&P NOTE
I have reviewed the surgical (or preoperative) H&P that is linked to this encounter, and examined the patient. There are no significant changes    Clinical Conditions Present on Arrival:  Clinically Significant Risk Factors Present on Admission          # Hypokalemia: Lowest K = 2.6 mmol/L in last 30 days, will replace as needed       # Hypoalbuminemia: Lowest albumin = 2.2 g/dL in the past 30 days , will monitor as appropriate

## 2023-04-08 NOTE — ANESTHESIA PROCEDURE NOTES
Airway       Patient location during procedure: OR       Procedure Start/Stop Times: 4/8/2023 11:36 AM  Staff -        Anesthesiologist:  Corby Ghotra DO       CRNA: Yodit Encinas APRN CRNA       Performed By: CRNA  Consent for Airway        Urgency: elective  Indications and Patient Condition       Indications for airway management: yenny-procedural       Induction type:intravenous       Mask difficulty assessment: 2 - vent by mask + OA or adjuvant +/- NMBA    Final Airway Details       Final airway type: endotracheal airway       Successful airway: ETT - single and Oral  Endotracheal Airway Details        ETT size (mm): 7.0       Cuffed: yes       Successful intubation technique: video laryngoscopy       VL Blade Size: Glidescope 3       Grade View of Cords: 1       Adjucts: stylet       Position: Left       Measured from: lips       Secured at (cm): 22       Bite block used: None    Post intubation assessment        Placement verified by: capnometry, equal breath sounds and chest rise        Number of attempts at approach: 1       Number of other approaches attempted: 0       Secured with: pink tape       Ease of procedure: easy       Dentition: Intact and Unchanged    Medication(s) Administered   Medication Administration Time: 4/8/2023 11:36 AM

## 2023-04-08 NOTE — PROCEDURES
Op Note  PREOPERATIVE DIAGNOSIS: purulent peritonitis     POSTOPERATIVE DIAGNOSIS: same, cultures pending     PROCEDURE:   1. Diagnostic laparoscopy  2. Laparoscopic lavage with drain placement  3. Incision and drainage of  incision  4. Wound vac placement to  incision     SURGEON:  Autumn Arteaga MD     ASSISTANT:  Jenise Antunez MD [facility] [place], Dr. Guerda Nuñez was also present during a majority of the procedure to evaluate the uterus intraoperativelyPREOPERATIVE DIAGNOSIS: purulent peritonitis     POSTOPERATIVE DIAGNOSIS: same, cultures pending     PROCEDURE:   1. Diagnostic laparoscopy  2. Laparoscopic lavage with drain placement  3. Incision and drainage of  incision  4. Wound vac placement to  incision     SURGEON:  Autumn Arteaga MD     ASSISTANT:  Jenise Antunez MD [facility], Dr. Guerda Nuñez was also present during a majority of the procedure to evaluate the uterus intraoperatively    I was present for the case performed By Dr. Morris.   Uterus with dense adhesion.   Uterine incision appeared intact.  Ovaries and fallopian tubes were not seen.   On going care per general surgery.   45 minutes spent in the OR    Guerda Hamm MD

## 2023-04-08 NOTE — PROGRESS NOTES
"Message on CM RN phone from 4/7/23 after hours [facility] phone [phone] fax [phone] stating \"this is Gee from [facility] and I was told that \"she is having surgery tomorrow, we were not informed of this, so we won't be opening her for tomorrow.\"      Per CM RN review of chart pt did discharge 4/6/23.  It appears pt called [facility] 4/7/23 due to a problem with a drain.  Appears this was unexpected and prior to homecare being able to get out to see pt.  Patient was readmitted 4/8/23 for surgery.  Will forward this note to [facility] so they have this information.      Recommend Care Management consult if anticipate pt needs homecare; will need to have new referrals and new orders.    "

## 2023-04-09 LAB
ALBUMIN SERPL BCG-MCNC: 3 G/DL (ref 3.5–5.2)
ALP SERPL-CCNC: 96 U/L (ref 35–104)
ALT SERPL W P-5'-P-CCNC: 18 U/L (ref 10–35)
ANION GAP SERPL CALCULATED.3IONS-SCNC: 10 MMOL/L (ref 7–15)
AST SERPL W P-5'-P-CCNC: 11 U/L (ref 10–35)
BILIRUB SERPL-MCNC: 0.3 MG/DL
BUN SERPL-MCNC: 7.6 MG/DL (ref 6–20)
CALCIUM SERPL-MCNC: 9.2 MG/DL (ref 8.6–10)
CHLORIDE SERPL-SCNC: 102 MMOL/L (ref 98–107)
CREAT SERPL-MCNC: 0.71 MG/DL (ref 0.51–0.95)
DEPRECATED HCO3 PLAS-SCNC: 25 MMOL/L (ref 22–29)
ERYTHROCYTE [DISTWIDTH] IN BLOOD BY AUTOMATED COUNT: 16 % (ref 10–15)
GFR SERPL CREATININE-BSD FRML MDRD: >90 ML/MIN/1.73M2
GLUCOSE SERPL-MCNC: 95 MG/DL (ref 70–99)
HCT VFR BLD AUTO: 27 % (ref 35–47)
HGB BLD-MCNC: 8.2 G/DL (ref 11.7–15.7)
MAGNESIUM SERPL-MCNC: 2.1 MG/DL (ref 1.7–2.3)
MCH RBC QN AUTO: 28.8 PG (ref 26.5–33)
MCHC RBC AUTO-ENTMCNC: 30.4 G/DL (ref 31.5–36.5)
MCV RBC AUTO: 95 FL (ref 78–100)
PLATELET # BLD AUTO: 658 10E3/UL (ref 150–450)
POTASSIUM SERPL-SCNC: 4.2 MMOL/L (ref 3.4–5.3)
PROT SERPL-MCNC: 6.1 G/DL (ref 6.4–8.3)
RBC # BLD AUTO: 2.85 10E6/UL (ref 3.8–5.2)
SODIUM SERPL-SCNC: 137 MMOL/L (ref 136–145)
WBC # BLD AUTO: 11.9 10E3/UL (ref 4–11)

## 2023-04-09 PROCEDURE — 250N000011 HC RX IP 250 OP 636: Performed by: SURGERY

## 2023-04-09 PROCEDURE — 250N000013 HC RX MED GY IP 250 OP 250 PS 637: Performed by: SURGERY

## 2023-04-09 PROCEDURE — 258N000003 HC RX IP 258 OP 636: Performed by: STUDENT IN AN ORGANIZED HEALTH CARE EDUCATION/TRAINING PROGRAM

## 2023-04-09 PROCEDURE — 80053 COMPREHEN METABOLIC PANEL: CPT | Performed by: STUDENT IN AN ORGANIZED HEALTH CARE EDUCATION/TRAINING PROGRAM

## 2023-04-09 PROCEDURE — 83735 ASSAY OF MAGNESIUM: CPT | Performed by: STUDENT IN AN ORGANIZED HEALTH CARE EDUCATION/TRAINING PROGRAM

## 2023-04-09 PROCEDURE — 250N000013 HC RX MED GY IP 250 OP 250 PS 637: Performed by: STUDENT IN AN ORGANIZED HEALTH CARE EDUCATION/TRAINING PROGRAM

## 2023-04-09 PROCEDURE — 85027 COMPLETE CBC AUTOMATED: CPT | Performed by: STUDENT IN AN ORGANIZED HEALTH CARE EDUCATION/TRAINING PROGRAM

## 2023-04-09 PROCEDURE — 99254 IP/OBS CNSLTJ NEW/EST MOD 60: CPT | Performed by: SPECIALIST

## 2023-04-09 PROCEDURE — 36415 COLL VENOUS BLD VENIPUNCTURE: CPT | Performed by: STUDENT IN AN ORGANIZED HEALTH CARE EDUCATION/TRAINING PROGRAM

## 2023-04-09 PROCEDURE — 120N000001 HC R&B MED SURG/OB

## 2023-04-09 RX ORDER — POLYETHYLENE GLYCOL 3350 17 G/17G
17 POWDER, FOR SOLUTION ORAL 2 TIMES DAILY
Status: DISCONTINUED | OUTPATIENT
Start: 2023-04-09 | End: 2023-04-12 | Stop reason: HOSPADM

## 2023-04-09 RX ADMIN — FERROUS SULFATE TAB 325 MG (65 MG ELEMENTAL FE) 325 MG: 325 (65 FE) TAB at 20:03

## 2023-04-09 RX ADMIN — POLYETHYLENE GLYCOL 3350 17 G: 17 POWDER, FOR SOLUTION ORAL at 20:03

## 2023-04-09 RX ADMIN — PIPERACILLIN AND TAZOBACTAM 3.38 G: 3; .375 INJECTION, POWDER, FOR SOLUTION INTRAVENOUS at 22:06

## 2023-04-09 RX ADMIN — PRENATAL VITAMINS-IRON FUMARATE 27 MG IRON-FOLIC ACID 0.8 MG TABLET 1 TABLET: at 08:31

## 2023-04-09 RX ADMIN — ACETAMINOPHEN 975 MG: 325 TABLET, FILM COATED ORAL at 16:27

## 2023-04-09 RX ADMIN — OXYCODONE HYDROCHLORIDE 5 MG: 5 TABLET ORAL at 04:02

## 2023-04-09 RX ADMIN — OXYCODONE HYDROCHLORIDE 5 MG: 5 TABLET ORAL at 08:37

## 2023-04-09 RX ADMIN — OXYCODONE HYDROCHLORIDE 10 MG: 5 TABLET ORAL at 17:54

## 2023-04-09 RX ADMIN — FERROUS SULFATE TAB 325 MG (65 MG ELEMENTAL FE) 325 MG: 325 (65 FE) TAB at 08:31

## 2023-04-09 RX ADMIN — OXYCODONE HYDROCHLORIDE AND ACETAMINOPHEN 1000 MG: 500 TABLET ORAL at 08:31

## 2023-04-09 RX ADMIN — ACETAMINOPHEN 975 MG: 325 TABLET, FILM COATED ORAL at 08:31

## 2023-04-09 RX ADMIN — SENNOSIDES AND DOCUSATE SODIUM 2 TABLET: 50; 8.6 TABLET ORAL at 08:31

## 2023-04-09 RX ADMIN — PIPERACILLIN AND TAZOBACTAM 3.38 G: 3; .375 INJECTION, POWDER, FOR SOLUTION INTRAVENOUS at 11:15

## 2023-04-09 RX ADMIN — OXYCODONE HYDROCHLORIDE 5 MG: 5 TABLET ORAL at 13:49

## 2023-04-09 RX ADMIN — PIPERACILLIN AND TAZOBACTAM 3.38 G: 3; .375 INJECTION, POWDER, FOR SOLUTION INTRAVENOUS at 16:28

## 2023-04-09 RX ADMIN — OXYCODONE HYDROCHLORIDE 10 MG: 5 TABLET ORAL at 22:06

## 2023-04-09 RX ADMIN — PIPERACILLIN AND TAZOBACTAM 3.38 G: 3; .375 INJECTION, POWDER, FOR SOLUTION INTRAVENOUS at 03:51

## 2023-04-09 RX ADMIN — Medication 50 MCG: at 08:31

## 2023-04-09 RX ADMIN — ACETAMINOPHEN 975 MG: 325 TABLET, FILM COATED ORAL at 00:04

## 2023-04-09 RX ADMIN — PANTOPRAZOLE SODIUM 40 MG: 40 TABLET, DELAYED RELEASE ORAL at 20:03

## 2023-04-09 RX ADMIN — SODIUM CHLORIDE, POTASSIUM CHLORIDE, SODIUM LACTATE AND CALCIUM CHLORIDE: 600; 310; 30; 20 INJECTION, SOLUTION INTRAVENOUS at 03:51

## 2023-04-09 ASSESSMENT — ACTIVITIES OF DAILY LIVING (ADL)
ADLS_ACUITY_SCORE: 20

## 2023-04-09 NOTE — PROGRESS NOTES
Surgery Note    Wound vac changed at bedside. Pt tolerated well. Wound measures 5cm x 3cm x2.5cm. versafoam white foam covered with black granufoam and vac tape.     Plan for next vac change on Tuesday.     Autumn Arteaga MD  [facility]  [phone]

## 2023-04-09 NOTE — PLAN OF CARE
Goal Outcome Evaluation:  A&O X4, VSS. POD 1 laparoscopic lavage with abdominal drain. 3 JPs, Left, right buttocks, minimal drainage, L/R serosanguineous, buttocks purulent. Wound vac in place no drainage. C/o pain in abdomen managed with Oxycodone. PIV site clean, dry, intact, LR running at 100 mL/hr. Normoactive bowel sounds, passing gas. Voiding adequately, prefers purewick. Ambulated in the ann X1. Discharge pending improvement.

## 2023-04-09 NOTE — CONSULTS
[facility]    Infectious Disease Consultation     Date of Admission:  2023  Date of Consult : 23    Assessment:  37 year old female who had a C section on 3/5 who was recently hospitalized with a post operative surgical site infection and pelvic abscess in the cul de sac requiring multiple drain placements, cxs were polymicrobial. She received IV antibiotics during hospital and was discharged on oral Augmentin but has been hospitalized due to persistent infection and is now s/p  laparoscopic abdominal washout with drain placement, and debridement of C section wound. 1L of purulent fluid was drained and pelvic adhesions were noted. Cxs are pending     -Pelvic abscess following recent C section. Failed conservative management and is now s/p laparoscopic abdominal washout  -Mild leukocytosis  -Anemia  -LE edema     Recommendations  1. Follow cx data  2. Continue Zosyn  3. Will plan oral transition once cxs are available    Janette Hutchins MD    Reason for Consult   I was asked to evaluate this patient for treatment recommendations for abdominal abscess    Primary Care Physician   Kamila Roland    Chief Complaint   Wound drainage    History is obtained from the patient and medical records    History of Present Illness   Aicha Draper is a 37 year old female who was recently admitted for a post C section abdominal abscess s/p IR drain placement. Her infection remained uncontrolled with ongoing purulent drainage from her C section wound and was therefore re hospitalized for surgical debridement.    Allyson had a prolonged labor and eventual  approximately 4 weeks ago (3/5/3023).  She had purulent fluid draining from her  incision on 3/30 and underwent I&D in clinic.  She developed bilateral lower extremity edema and was admitted to the hospital on 3/29/2023, and work up revealed loculated fluid collections in the pelvic cul-de-sac and lower abdomen. She underwent IR  drainage of this on 3/30/2023 with a gluteal drain and anterior drain.  She had a follow-up CT on 4/3/2023 which revealed decreased size of the pelvic cul-de-sac fluid collection but a thin second lower abdominal collection as well with a drain in place.  She had another CT on 4/3/2023 and had 1 drain removed with a new drain placed.  She had another IR abscess tube check on 2023 with upsizing of the existing drain in the left lower quadrant to an 18 Kinyarwanda drain and it it was felt that the fluid would not drain adequately given the thick consistency.  The patient had discharge from the hospital on 2023 on Augmentin due to the polymicrobial infection but infection remained uncontrolled.    She is now s/p laparoscopic abdominal washout with drain placement, and debridement of C section wound. 1L of purulent fluid was drained and pelvic adhesions were noted.    Patient has been initiated on Zosyn and ID has been asked to assist with antibiotic management. She has mild leukocytosis. Surgical cxs are pending, stain is polymicrobial    She feels better, pain controlled, no new complaints  Past Medical History   I have reviewed this patient's medical history and updated it with pertinent information if needed.   Past Medical History:   Diagnosis Date     Acute congestive heart failure, unspecified heart failure type (H) 2023     Atypical squamous cells of undetermined significance (ASCUS) on Papanicolaou smear of cervix 2015    ASCUS cyto, repeat pap 1 yr     In vitro fertilization      PCOS (polycystic ovarian syndrome)      PONV (postoperative nausea and vomiting)        Past Surgical History   I have reviewed this patient's surgical history and updated it with pertinent information if needed.  Past Surgical History:   Procedure Laterality Date      SECTION N/A 3/5/2023    Procedure:  section;  Surgeon: Guerda Hamm MD;  Location:  L+D     CHOLECYSTECTOMY, LAPOROSCOPIC    2010    Cholecystectomy, Laparoscopic     IR ABSCESS TUBE CHANGE  4/3/2023     SURGICAL HISTORY OF -   1997    STOMACH SURGERY REMOVE HAIRBALL     Z APPENDECTOMY  1997    incidental with above       Prior to Admission Medications   Prior to Admission Medications   Prescriptions Last Dose Informant Patient Reported? Taking?   Prenatal Vit-Fe Fumarate-FA (PRENATAL MULTIVITAMIN W/IRON) 27-0.8 MG tablet Unknown Self Yes Yes   Sig: Take 1 tablet by mouth daily   acetaminophen (TYLENOL) 500 MG tablet 4/7/2023 at 1700  No Yes   Sig: Take 2 tablets (1,000 mg) by mouth every 6 hours as needed for mild pain   amoxicillin-clavulanate (AUGMENTIN) 875-125 MG tablet 4/8/2023 at 0700  No Yes   Sig: Take 1 tablet by mouth every 12 hours for 7 days   calcium carbonate (TUMS) 500 MG chewable tablet Unknown Self Yes Yes   Sig: Take 2 chew tab by mouth 4 times daily as needed for heartburn   ferrous sulfate (FEROSUL) 325 (65 Fe) MG tablet 4/7/2023 Self No Yes   Sig: Take 1 tablet (325 mg) by mouth 2 times daily   fish oil-omega-3 fatty acids 1000 MG capsule Past Month Self Yes Yes   Sig: Take 2 g by mouth every evening   ibuprofen (ADVIL/MOTRIN) 200 MG tablet Past Week  No Yes   Sig: Take 2 tablets (400 mg) by mouth every 6 hours as needed for pain   omeprazole (PRILOSEC) 20 MG DR capsule Past Month Self Yes Yes   Sig: Take 20 mg by mouth every evening   oxyCODONE (ROXICODONE) 5 MG tablet 4/7/2023  No Yes   Sig: Take 1 tablet (5 mg) by mouth every 6 hours as needed for severe pain (IF pain not managed with non-pharmacological and non-opioid interventions)   senna-docusate (SENOKOT-S/PERICOLACE) 8.6-50 MG tablet 4/7/2023  No Yes   Sig: Take 2 tablets by mouth daily   sodium chloride, PF, (SALINE FLUSH) 0.9% PF flush 4/7/2023  No Yes   Sig: 10 ml NS flush twice a day to front drain and once a day to back drain   vitamin C (ASCORBIC ACID) 1000 MG TABS Past Month Self Yes Yes   Sig: Take 1,000 mg by mouth daily   vitamin D3  (CHOLECALCIFEROL) 50 mcg (2000 units) tablet Past Month Self Yes Yes   Sig: Take 1 tablet by mouth daily      Facility-Administered Medications: None     Allergies   Allergies   Allergen Reactions     No Known Drug Allergies        Immunization History   Immunization History   Administered Date(s) Administered     HEPA 07/20/2006, 12/23/2008     HepB 04/26/2000, 08/22/2000, 12/29/2000     Influenza (IIV3) PF 09/19/2011, 09/15/2012     MMR 03/19/1999     Meningococcal ACWY (Menactra ) 07/20/2006     TD,PF 7+ (Tenivac) 03/19/1999, 02/26/2020     TDAP Vaccine (Adacel) 12/23/2008       Social History   I have reviewed this patient's social history and updated it with pertinent information if needed. Aicha Draper  reports that she has never smoked. She has never used smokeless tobacco. She reports that she does not currently use alcohol. She reports that she does not use drugs.    Family History   I have reviewed this patient's family history and updated it with pertinent information if needed.   Family History   Problem Relation Age of Onset     Arthritis Father         onset 23- 24 yrs     Hypertension Mother      Hyperlipidemia Mother      Diabetes Paternal Grandmother      Arthritis Paternal Grandmother      C.A.D. Maternal Grandmother      C.A.D. Maternal Grandfather      Prostate Cancer Maternal Grandfather      C.A.D. Paternal Grandfather      Arthritis Paternal Grandfather      Breast Cancer Other        Review of Systems   The 10 point Review of Systems is as per HPI    Physical Exam   Temp: 97.4  F (36.3  C) Temp src: Oral BP: 110/62 Pulse: 54   Resp: 18 SpO2: 99 % O2 Device: None (Room air) Oxygen Delivery: 2 LPM  Vital Signs with Ranges  Temp:  [97.1  F (36.2  C)-99.5  F (37.5  C)] 97.4  F (36.3  C)  Pulse:  [54-93] 54  Resp:  [13-20] 18  BP: (110-132)/() 110/62  SpO2:  [95 %-100 %] 99 %  137 lbs 0 oz  Body mass index is 23.52 kg/m .    GENERAL APPEARANCE:  awake  EYES: Eyes grossly normal to  inspection  RESP: non labored breathing  ABDOMEN: soft, wound vac on C section surgical site, SYMONE drain with serous output, gluteal drain with purulent output  MS: extremities normal  SKIN: no suspicious lesions or rashes      Data   All laboratory data reviewed  Component      Latest Ref Rng 4/9/2023   WBC      4.0 - 11.0 10e3/uL 11.9 (H)    RBC Count      3.80 - 5.20 10e6/uL 2.85 (L)    Hemoglobin      11.7 - 15.7 g/dL 8.2 (L)    Hematocrit      35.0 - 47.0 % 27.0 (L)    MCV      78 - 100 fL 95    MCH      26.5 - 33.0 pg 28.8    MCHC      31.5 - 36.5 g/dL 30.4 (L)    RDW      10.0 - 15.0 % 16.0 (H)    Platelet Count      150 - 450 10e3/uL 658 (H)       Component      Latest Ref Rng 4/9/2023   Sodium      136 - 145 mmol/L 137    Potassium      3.4 - 5.3 mmol/L 4.2    Chloride      98 - 107 mmol/L 102    Carbon Dioxide (CO2)      22 - 29 mmol/L 25    Anion Gap      7 - 15 mmol/L 10    Urea Nitrogen      6.0 - 20.0 mg/dL 7.6    Creatinine      0.51 - 0.95 mg/dL 0.71    Calcium      8.6 - 10.0 mg/dL 9.2    Glucose      70 - 99 mg/dL 95    Alkaline Phosphatase      35 - 104 U/L 96    AST      10 - 35 U/L 11    ALT      10 - 35 U/L 18    Protein Total      6.4 - 8.3 g/dL 6.1 (L)    Albumin      3.5 - 5.2 g/dL 3.0 (L)    Bilirubin Total      <=1.2 mg/dL 0.3    GFR Estimate      >60 mL/min/1.73m2 >90       Microbiology  4/8 abdominal abscess  Abdomen; Abscess    0 Result Notes  Gram Stain Result   Abnormal   3+ Gram positive cocci      3+ Gram negative bacilli      4+ WBC seen   Predominantly PMNs           Prior  3/30 pelvic aspirate  Pelvis, Right; Aspirate    0 Result Notes  Culture 4+ Mixed Anaerobic Organisms Present       4+ Bacteroides fragilis Abnormal         Pelvis, Right; Aspirate    0 Result Notes  Culture Isolated in broth only Anaerobic Gram negative bacilli Abnormal     On day 1 of incubation  See anaerobic report for identification   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

## 2023-04-09 NOTE — PROGRESS NOTES
[facility] Gynecology              Interval History:   Feels well this morning.   Some pain from drain sites.   Able to walk this morning twice.   Continues to need oxycodone for pain .       Significant Problems:   None          Review of Systems:   The Review of Systems is negative other than noted in the HPI          Medications:     Current Facility-Administered Medications   Medication     acetaminophen (TYLENOL) tablet 975 mg     bisacodyl (DULCOLAX) suppository 10 mg     bupivacaine 0.25 % - EPINEPHrine 1:200,000 (PF) injection     calcium carbonate (TUMS) chewable tablet 1,000 mg     ferrous sulfate (FEROSUL) tablet 325 mg     HYDROmorphone (DILAUDID) injection 0.2 mg    Or     HYDROmorphone (DILAUDID) injection 0.4 mg     lidocaine (LMX4) cream     lidocaine 1 % 0.1-1 mL     magnesium hydroxide (MILK OF MAGNESIA) suspension 30 mL     naloxone (NARCAN) injection 0.2 mg    Or     naloxone (NARCAN) injection 0.4 mg    Or     naloxone (NARCAN) injection 0.2 mg    Or     naloxone (NARCAN) injection 0.4 mg     ondansetron (ZOFRAN ODT) ODT tab 4 mg    Or     ondansetron (ZOFRAN) injection 4 mg     oxyCODONE (ROXICODONE) tablet 5 mg    Or     oxyCODONE (ROXICODONE) tablet 10 mg     pantoprazole (PROTONIX) EC tablet 40 mg     piperacillin-tazobactam (ZOSYN) 3.375 g vial to attach to  mL bag     polyethylene glycol (MIRALAX) Packet 17 g     prenatal multivitamin w/iron per tablet 1 tablet     prochlorperazine (COMPAZINE) injection 10 mg    Or     prochlorperazine (COMPAZINE) tablet 10 mg     prochlorperazine (COMPAZINE) injection 10 mg    Or     prochlorperazine (COMPAZINE) tablet 10 mg    Or     prochlorperazine (COMPAZINE) suppository 25 mg     scopolamine (TRANSDERM-SCOP) Patch in Place     senna-docusate (SENOKOT-S/PERICOLACE) 8.6-50 MG per tablet 2 tablet     sodium chloride (PF) 0.9% PF flush 3 mL     sodium chloride (PF) 0.9% PF flush 3 mL     sodium chloride 0.9% (bag) irrigation      "sodium chloride 0.9% (bottle) irrigation     vitamin C (ASCORBIC ACID) tablet 1,000 mg     vitamin D3 (CHOLECALCIFEROL) tablet 50 mcg        /62 (BP Location: Right arm)   Pulse 54   Temp 97.4  F (36.3  C) (Oral)   Resp 18   Ht 1.626 m (5' 4\")   Wt 62.1 kg (137 lb)   SpO2 99%   Breastfeeding No   BMI 23.52 kg/m    Abdomen: soft, multiple bandages in place.           Data:     Management per general surgery.        Guerda Hamm MD     "

## 2023-04-09 NOTE — PROGRESS NOTES
A/Ox4, VSS on RA, pain managed with PRN Oxy and scheduled meds, denies N/V. Independent, ambulated in hallway, PIV SL with intermittent Abx. 3X SYMONE drains, right buttock flushed per order, abdominal incision CDI, wound vac in place. Continue plan of care.

## 2023-04-09 NOTE — PLAN OF CARE
Received pt 1700, pt is AOx4, pain is managed with oxy, abdomen soft, BS +, not yet passing gas, tolerating some clears for shift, 3x JPs, wound vac wnl, no output, lap sites wnl, IVF, Daniels removed, purewick per pt requested, declined ambulation for shift stating pain and tiredness, edu provided. Awaiting progress.

## 2023-04-09 NOTE — PROGRESS NOTES
"[facility]  GENERAL SURGERY Progress Note    Admission Date: 2023         Assessment and Plan:     Aicha Draper is a 37 year old female  s/p  2023 complicated by pelvic and abdominal abscesses requiring multiple IR drain placement. No s/p diagnostic laparoscopy with washout of purulence, placement of two new intraabdominal SYMONE drains and wound vac placement over  incision with fascial dehiscence on 2023. Intra-operative cultures growing gram positive cocci and gram negative bacilli.    - Advance diet to regular  - Pain control- scheduled Tylenol, PRN oxycodone and dilaudid  - WBC 11.9, on Zosyn  - ID consulted, appreciated assistance with antibiotic management  - Follow cultures for speciation  - Flush buttock drain q shift  - Monitor and record drain output  - Hgb stable, start Lovenox  - Continue Protonix  - Discontinue linton  - Wound vac in place, MD to change, reinforce with with Tegaderm if leaking  - Ambulate 4x day and encourage IS             Interval History:     Pain controlled, not requiring IV medications. Some mild post-op nausea that has since resolved and otherwise is feeling well. Hoping to start a regular diet this AM.   Meds reviewed.                      Physical Exam:   Blood pressure 110/62, pulse 54, temperature 97.4  F (36.3  C), temperature source Oral, resp. rate 18, height 1.626 m (5' 4\"), weight 62.1 kg (137 lb), SpO2 99 %, not currently breastfeeding.  Temperature Temp  Av.9  F (36.6  C)  Min: 97.1  F (36.2  C)  Max: 99.5  F (37.5  C)   I/O last 3 completed shifts:  In: 5653 [P.O.:750; I.V.:4903]  Out: 1910 [Urine:1775; Drains:110; Blood:25]     Constitutional:  Awake and in no apparent distress.   Lungs: No increased work of breathing, good air exchange   Cardiovascular: Regular rate and rhythm   Abdomen: Soft, non-distended, appropriately tender at incision(s). Abdominal SYMONE drain x2 intact, serosanguinous. Buttock " drain with purulence in tube, not draining much. Wound vac intact.   Wound(s): Clean, dry, and intact. No erythema or drainage.    Extremities: No edema or calf tenderness. +SCDs          Data:     Recent Labs   Lab Test 04/09/23  0705 04/04/23  0741 04/03/23  0748 04/02/23  0713 04/01/23  0755   WBC 11.9*  --   --  9.9 10.5   HGB 8.2* 8.1* 8.0* 7.9* 8.2*   HCT 27.0*  --   --  25.3* 25.2*   *  --   --  502* 462*      Recent Labs   Lab Test 04/09/23  0705 04/04/23  0741 04/03/23  0748 04/02/23  0713     --  137 136   POTASSIUM 4.2 4.6 4.2 4.2   CHLORIDE 102  --  104 103   CO2 25  --  21* 23   BUN 7.6  --  5.9* 6.1   CR 0.71 0.81 0.68 0.72     Recent Labs   Lab Test 04/09/23  0705 03/30/23  0709 03/29/23  1204   BILITOTAL 0.3 0.2 0.2   DBIL  --   --  <0.20   ALT 18 14 14   AST 11 21 15   ALKPHOS 96 107* 120*     No lab results found.  Recent Labs   Lab Test 04/09/23  0705 04/04/23  0741 04/03/23  0748 04/02/23  0713   DEMETRICE 9.2  --  8.9 8.5*   MAG 2.1 2.2 2.3 2.1     Recent Labs   Lab Test 04/09/23  0705 04/03/23  0748 04/02/23  0713 03/31/23  0733 03/30/23  0709 03/29/23  1543 03/29/23  1204 02/26/20  0934 12/21/15  1046   ANIONGAP 10 12 10   < > 13  --  14   < >  --    PROTEIN  --   --   --   --   --  Negative  --   --  Negative   ALBUMIN 3.0*  --   --   --  2.3*  --  2.2*   < >  --     < > = values in this interval not displayed.     Jenise Antunez MD, MPH 04/09/23 8:16 AM   General Surgery Resident - PGY4

## 2023-04-10 PROCEDURE — 99233 SBSQ HOSP IP/OBS HIGH 50: CPT | Performed by: SPECIALIST

## 2023-04-10 PROCEDURE — 250N000013 HC RX MED GY IP 250 OP 250 PS 637: Performed by: SURGERY

## 2023-04-10 PROCEDURE — 250N000011 HC RX IP 250 OP 636: Performed by: SPECIALIST

## 2023-04-10 PROCEDURE — 120N000001 HC R&B MED SURG/OB

## 2023-04-10 PROCEDURE — 250N000011 HC RX IP 250 OP 636: Performed by: SURGERY

## 2023-04-10 PROCEDURE — 250N000013 HC RX MED GY IP 250 OP 250 PS 637: Performed by: STUDENT IN AN ORGANIZED HEALTH CARE EDUCATION/TRAINING PROGRAM

## 2023-04-10 RX ORDER — AMPICILLIN AND SULBACTAM 2; 1 G/1; G/1
3 INJECTION, POWDER, FOR SOLUTION INTRAMUSCULAR; INTRAVENOUS EVERY 6 HOURS
Status: DISCONTINUED | OUTPATIENT
Start: 2023-04-10 | End: 2023-04-12 | Stop reason: HOSPADM

## 2023-04-10 RX ADMIN — Medication 50 MCG: at 08:46

## 2023-04-10 RX ADMIN — SENNOSIDES AND DOCUSATE SODIUM 2 TABLET: 50; 8.6 TABLET ORAL at 08:46

## 2023-04-10 RX ADMIN — ACETAMINOPHEN 975 MG: 325 TABLET, FILM COATED ORAL at 15:14

## 2023-04-10 RX ADMIN — PIPERACILLIN AND TAZOBACTAM 3.38 G: 3; .375 INJECTION, POWDER, FOR SOLUTION INTRAVENOUS at 04:15

## 2023-04-10 RX ADMIN — ACETAMINOPHEN 975 MG: 325 TABLET, FILM COATED ORAL at 08:46

## 2023-04-10 RX ADMIN — ACETAMINOPHEN 975 MG: 325 TABLET, FILM COATED ORAL at 00:19

## 2023-04-10 RX ADMIN — OXYCODONE HYDROCHLORIDE 10 MG: 5 TABLET ORAL at 21:53

## 2023-04-10 RX ADMIN — OXYCODONE HYDROCHLORIDE 10 MG: 5 TABLET ORAL at 15:14

## 2023-04-10 RX ADMIN — AMPICILLIN SODIUM AND SULBACTAM SODIUM 3 G: 2; 1 INJECTION, POWDER, FOR SOLUTION INTRAMUSCULAR; INTRAVENOUS at 20:13

## 2023-04-10 RX ADMIN — OXYCODONE HYDROCHLORIDE 10 MG: 5 TABLET ORAL at 02:37

## 2023-04-10 RX ADMIN — PIPERACILLIN AND TAZOBACTAM 3.38 G: 3; .375 INJECTION, POWDER, FOR SOLUTION INTRAVENOUS at 09:57

## 2023-04-10 RX ADMIN — OXYCODONE HYDROCHLORIDE 10 MG: 5 TABLET ORAL at 08:46

## 2023-04-10 RX ADMIN — FERROUS SULFATE TAB 325 MG (65 MG ELEMENTAL FE) 325 MG: 325 (65 FE) TAB at 08:46

## 2023-04-10 RX ADMIN — OXYCODONE HYDROCHLORIDE AND ACETAMINOPHEN 1000 MG: 500 TABLET ORAL at 08:46

## 2023-04-10 RX ADMIN — PRENATAL VITAMINS-IRON FUMARATE 27 MG IRON-FOLIC ACID 0.8 MG TABLET 1 TABLET: at 08:46

## 2023-04-10 RX ADMIN — POLYETHYLENE GLYCOL 3350 17 G: 17 POWDER, FOR SOLUTION ORAL at 20:08

## 2023-04-10 RX ADMIN — POLYETHYLENE GLYCOL 3350 17 G: 17 POWDER, FOR SOLUTION ORAL at 08:47

## 2023-04-10 RX ADMIN — PANTOPRAZOLE SODIUM 40 MG: 40 TABLET, DELAYED RELEASE ORAL at 20:08

## 2023-04-10 RX ADMIN — AMPICILLIN SODIUM AND SULBACTAM SODIUM 3 G: 2; 1 INJECTION, POWDER, FOR SOLUTION INTRAMUSCULAR; INTRAVENOUS at 15:13

## 2023-04-10 RX ADMIN — FERROUS SULFATE TAB 325 MG (65 MG ELEMENTAL FE) 325 MG: 325 (65 FE) TAB at 20:08

## 2023-04-10 ASSESSMENT — ACTIVITIES OF DAILY LIVING (ADL)
ADLS_ACUITY_SCORE: 24
ADLS_ACUITY_SCORE: 20
DEPENDENT_IADLS:: INDEPENDENT
ADLS_ACUITY_SCORE: 20
ADLS_ACUITY_SCORE: 20
ADLS_ACUITY_SCORE: 24
ADLS_ACUITY_SCORE: 20
ADLS_ACUITY_SCORE: 24
ADLS_ACUITY_SCORE: 20
ADLS_ACUITY_SCORE: 24
ADLS_ACUITY_SCORE: 20

## 2023-04-10 NOTE — PLAN OF CARE
Goal Outcome Evaluation:    A&O X4, VSS. POD 2 laparoscopic lavage with abdominal drain. 3 JPs, Left, right buttocks, L/R serosanguineous, buttocks purulent, stripped and irrigation done. Wound vac in place, serosanguinous 0 output. C/o pain in abdomen managed with 10 mg Oxycodone. 2 PIV site clean, dry, intact, saline locked. Normoactive bowel sounds, passing gas. Voiding adequately, prefers purewick. Discharge pending improvement.

## 2023-04-10 NOTE — CONSULTS
Care Management Initial Consult    General Information  Assessment completed with: Patient, VM-chart review,    Type of CM/SW Visit: Initial Assessment    Primary Care Provider verified and updated as needed: Yes (has new PCP appointment for [facility] provider at end of May)   Readmission within the last 30 days: other (see comments)   Return Category: Post-op/Post-procedure complication  Reason for Consult: discharge planning  Advance Care Planning: Advance Care Planning Reviewed: verified with patient          Communication Assessment  Patient's communication style: spoken language (English or Bilingual)    Hearing Difficulty or Deaf: no   Wear Glasses or Blind: no    Cognitive  Cognitive/Neuro/Behavioral: WDL                      Living Environment:   People in home: spouse, child(ashley), dependent, parent(s)     Current living Arrangements: house      Able to return to prior arrangements: yes       Family/Social Support:  Care provided by: self, spouse/significant other  Provides care for: child(ashley)  Marital Status:   , Parent(s)  Alfredo       Description of Support System: Supportive, Involved         Current Resources:   Patient receiving home care services: Yes (was just going to start home care with [facility])  Skilled Home Care Services: Skilled Nursing  Community Resources: None  Equipment currently used at home: none  Supplies currently used at home: Wound Care Supplies    Employment/Financial:  Employment Status: employed full-time (on medical leave)        Financial Concerns: No concerns identified   Referral to Financial Worker: No       Lifestyle & Psychosocial Needs:  Social Determinants of Health     Tobacco Use: Low Risk  (4/10/2023)    Patient History      Smoking Tobacco Use: Never      Smokeless Tobacco Use: Never      Passive Exposure: Not on file   Alcohol Use: Not on file   Financial Resource Strain: Not on file   Food Insecurity: Not on file   Transportation Needs: Not on file    Physical Activity: Not on file   Stress: Not on file   Social Connections: Not on file   Intimate Partner Violence: Not on file   Depression: Not at risk (2/26/2020)    PHQ-2      PHQ-2 Score: 0   Housing Stability: Not on file   Postpartum Depression: Not on file (3/6/2023)       Functional Status:  Prior to admission patient needed assistance:   Dependent ADLs:: Independent  Dependent IADLs:: Independent       Mental Health Status:  Mental Health Status: No Current Concerns       Chemical Dependency Status:  Chemical Dependency Status: No Current Concerns             Values/Beliefs:  Spiritual, Cultural Beliefs, Advent Practices, Values that affect care: no               Additional Information:  Initial consult done with patient. Patient states home care was slated to open but then needed surgery. Patient waiting to hear plan on wound for after hospital. Patient wonders if she will need a wound vac at discharge.     Case management called [facility] at phone [phone]. Detailed voicemail left for return phone call. -- call back received and will need new referral,referral sent through DOD process.     Providers if wound vac needed at discharge please notify team as soon as possible to start process for home vac. Thanks!    Alejandra Robles RN   [facility]   Phone [phone]

## 2023-04-10 NOTE — PROGRESS NOTES
GYN progress note    S: Notes post op pain but controlled with pain meds.  Able to ambulate after pain meds.  Tolerating PO. Overall feeling better.    O:  Patient Vitals for the past 24 hrs:   BP Temp Temp src Pulse Resp SpO2   04/10/23 0753 109/62 98.2  F (36.8  C) Oral 71 16 99 %   04/10/23 0010 100/54 98.6  F (37  C) Oral 75 16 96 %   04/09/23 2245 -- -- -- -- 16 --   04/09/23 2206 -- -- -- -- 16 --   04/09/23 1545 105/56 97.9  F (36.6  C) Oral 77 18 100 %     AO, NAD  Abd: soft, min distended, min generalized tenderness  Incisions: covered  Ext: NT w/o edema    A/P: POD #2 s/p I&D and laparoscopic lavage for purulent peritonitis by gen surg.  -reviewed back to work considerations as a significant amt of her leave has been complicated by infection  -reviewed future testing for possible future pregnancies (IVF, has embryos). Strongly encouraged planning at least 18 months to allow for healing  -dispo/mgmt per gen surg    Tennille Cordova MD on 4/10/2023 at 9:32 AM

## 2023-04-10 NOTE — PROGRESS NOTES
Reason for admission:   Intra-abdominal abscess post-procedure   Surgical Procedure/s: Diagnostic laparoscopy, laparoscopic lavage with abdominal drain and  wound vac placement.  POD#:  2  Mental Status: x4  Activity: SBA to Ind  Diet: Regular  Pain: Controlled  Urination: Continent. Uses the bathroom  Tele/Restraints/Iso: NA  LDA: PIV SL, Wound vac at 75 mmhg continous.  Expected D/C Date: TBD  Other Info: Denies n/v. Passing a lot of gas but no BM yet. MD to change wound vac laquita . s/p  2023 complicated by pelvic and abdominal abscesses requiring multiple IR drain placement. Intra-operative cultures growing gram positive cocci and gram negative bacilli on gram stain. ID today switch to IV Unasyn.

## 2023-04-10 NOTE — PROGRESS NOTES
[facility]    Infectious Disease Progress Note    Date of Service: 04/10/2023     Assessment:  37 year old female who had a C section on 3/5 who was recently hospitalized with a post operative surgical site infection and pelvic abscess in the cul de sac requiring multiple drain placements, cxs were polymicrobial. She received IV antibiotics during hospital and was discharged on oral Augmentin but has been hospitalized due to persistent infection and is now s/p  laparoscopic abdominal washout with drain placement, and debridement of C section wound. 1L of purulent fluid was drained and pelvic adhesions were noted. Cxs with GPC and GNB likely same organisms as before     -Pelvic abscess following recent C section. Failed conservative management and is now s/p laparoscopic abdominal washout  -Mild leukocytosis  -Anemia  -LE edema     Recommendations  1. Follow cx data  2. Zosyn to unasyn  Oral antibiotic plan based on cx/susceptibility  data      Janette Hutchins MD    Interval History   Feels ok, pain controlled, tolerating antibiotics without side effects, drains in place    Physical Exam   Temp: 98.2  F (36.8  C) Temp src: Oral BP: 109/62 Pulse: 71   Resp: 16 SpO2: 99 % O2 Device: None (Room air)    Vitals:    04/08/23 0851   Weight: 62.1 kg (137 lb)     Vital Signs with Ranges  Temp:  [97.9  F (36.6  C)-98.6  F (37  C)] 98.2  F (36.8  C)  Pulse:  [71-77] 71  Resp:  [16-18] 16  BP: (100-109)/(54-62) 109/62  SpO2:  [96 %-100 %] 99 %    GENERAL APPEARANCE:  awake  EYES: Eyes grossly normal to inspection  RESP: non labored breathing  ABDOMEN: soft, wound vac on C section surgical site, SMYONE drain with serous output, gluteal drain with purulent output  MS: extremities normal  SKIN: no suspicious lesions or rashes    Other:    Medications       acetaminophen  975 mg Oral Q8H     ferrous sulfate  325 mg Oral BID     pantoprazole  40 mg Oral QPM     piperacillin-tazobactam  3.375 g Intravenous Q6H      polyethylene glycol  17 g Oral BID     prenatal multivitamin w/iron  1 tablet Oral Daily     senna-docusate  2 tablet Oral Daily     sodium chloride (PF)  3 mL Intracatheter Q8H     vitamin C  1,000 mg Oral Daily     vitamin D3  50 mcg Oral Daily       Data   All microbiology laboratory data reviewed.  Recent Labs   Lab Test 04/09/23 0705 04/04/23 0741 04/03/23  0748 04/02/23  0713 04/01/23  0755   WBC 11.9*  --   --  9.9 10.5   HGB 8.2* 8.1* 8.0* 7.9* 8.2*   HCT 27.0*  --   --  25.3* 25.2*   MCV 95  --   --  90 89   *  --   --  502* 462*     Recent Labs   Lab Test 04/09/23 0705 04/04/23 0741 04/03/23  0748   CR 0.71 0.81 0.68     Microbiology  4/8 abdominal cx  Abdomen; Abscess    0 Result Notes  Culture Culture in progress       Isolated in broth only Gram negative bacilli Abnormal     On day 2 of incubation   Isolated in broth only Gram positive cocci Abnormal     On day 2 of incubation

## 2023-04-10 NOTE — PROGRESS NOTES
"[facility]  GENERAL SURGERY Progress Note    Admission Date: 2023  4/10/2023         Assessment and Plan:     Aicha Draper is a 37 year old female  s/p  2023 complicated by pelvic and abdominal abscesses requiring multiple IR drain placement. No s/p diagnostic laparoscopy with washout of purulence, placement of two new intraabdominal SYMONE drains and wound vac placement over  incision with fascial dehiscence on 2023. Intra-operative cultures growing gram positive cocci and gram negative bacilli on gram stain.    - Regular diet  - Pain control- scheduled Tylenol, PRN oxycodone and dilaudid  - WBC 11.9 on , on Zosyn  - ID consulted, appreciated assistance with antibiotic management  - Follow cultures for speciation  - Flush buttock drain q shift  - Monitor and record drain output  - Lovenox VTE PPX  - Continue Protonix  - Wound vac in place, MD to change , reinforce with with Tegaderm if leaking  - Ambulate 4x day and encourage IS             Interval History:     Pain controlled and improved from yesterday, not requiring IV medications. Denies nausea, scop patch in place. Ambulating appropriately, will plan to get up to chair for several hours today. Tolerating diet.                      Physical Exam:   Blood pressure 109/62, pulse 71, temperature 98.2  F (36.8  C), temperature source Oral, resp. rate 16, height 1.626 m (5' 4\"), weight 62.1 kg (137 lb), SpO2 99 %, not currently breastfeeding.  Temperature Temp  Av.9  F (36.6  C)  Min: 97.1  F (36.2  C)  Max: 99.5  F (37.5  C)   I/O last 3 completed shifts:  In: -   Out: 2590 [Urine:2500; Drains:90]     Constitutional:  Awake and in no apparent distress.   Lungs: No increased work of breathing, good air exchange   Cardiovascular: Regular rate and rhythm   Abdomen: Soft, non-distended, appropriately tender at incision(s). Abdominal SYMONE drain x2 intact, serosanguinous. Buttock drain with purulence in " tube, not draining much. Wound vac intact.   Wound(s): Clean, dry, and intact. No erythema or drainage.    Extremities: No edema or calf tenderness. +SCDs          Data:     Recent Labs   Lab Test 04/09/23  0705 04/04/23  0741 04/03/23  0748 04/02/23  0713 04/01/23  0755   WBC 11.9*  --   --  9.9 10.5   HGB 8.2* 8.1* 8.0* 7.9* 8.2*   HCT 27.0*  --   --  25.3* 25.2*   *  --   --  502* 462*      Recent Labs   Lab Test 04/09/23  0705 04/04/23  0741 04/03/23  0748 04/02/23  0713     --  137 136   POTASSIUM 4.2 4.6 4.2 4.2   CHLORIDE 102  --  104 103   CO2 25  --  21* 23   BUN 7.6  --  5.9* 6.1   CR 0.71 0.81 0.68 0.72     Recent Labs   Lab Test 04/09/23  0705 03/30/23  0709 03/29/23  1204   BILITOTAL 0.3 0.2 0.2   DBIL  --   --  <0.20   ALT 18 14 14   AST 11 21 15   ALKPHOS 96 107* 120*     No lab results found.  Recent Labs   Lab Test 04/09/23  0705 04/04/23  0741 04/03/23  0748 04/02/23  0713   DEMETRICE 9.2  --  8.9 8.5*   MAG 2.1 2.2 2.3 2.1     Recent Labs   Lab Test 04/09/23  0705 04/03/23  0748 04/02/23  0713 03/31/23  0733 03/30/23  0709 03/29/23  1543 03/29/23  1204 02/26/20  0934 12/21/15  1046   ANIONGAP 10 12 10   < > 13  --  14   < >  --    PROTEIN  --   --   --   --   --  Negative  --   --  Negative   ALBUMIN 3.0*  --   --   --  2.3*  --  2.2*   < >  --     < > = values in this interval not displayed.     Brandon Glover MD on 4/10/2023 at 11:12 AM  General Surgery Resident [id number]

## 2023-04-10 NOTE — PROGRESS NOTES
"SPIRITUAL [facility]ALTH SERVICES Progress Note  [facility] General Surgery    Saw pt Aicha Draper per RN referral. Introduced myself and Spiritual Health Services. Pt politely declined a visit, stating \"No, thanks.\" Informed how to access Spirit[facility] Services in the future.      Spiritual Health Services remain available.     AMALIA HaynesDiv  Chaplain Resident   Demkv-[phone]   "

## 2023-04-10 NOTE — PROGRESS NOTES
Oriented x4.  Intermittent zosyn.  Wound vacuum to -75 mmHg suction, C/D/I.  SYMONE drain on left and right abdomen with serosanguinous drainage.  Right buttock SYMONE with serous drainage.  Buttock drain irrigated with 20 ml per order.  CO pain in abdomen, PO oxycodone given.  Ambulated in halls.  Incontinence with walking.  Purewick while in bed.  Discharge plan pending progress.

## 2023-04-11 ENCOUNTER — APPOINTMENT (OUTPATIENT)
Dept: CT IMAGING | Facility: CLINIC | Age: 38
End: 2023-04-11
Attending: SURGERY
Payer: COMMERCIAL

## 2023-04-11 LAB
BASOPHILS # BLD AUTO: 0.1 10E3/UL (ref 0–0.2)
BASOPHILS NFR BLD AUTO: 1 %
CRP SERPL-MCNC: 88.53 MG/L
EOSINOPHIL # BLD AUTO: 0.4 10E3/UL (ref 0–0.7)
EOSINOPHIL NFR BLD AUTO: 5 %
ERYTHROCYTE [DISTWIDTH] IN BLOOD BY AUTOMATED COUNT: 15.8 % (ref 10–15)
HCT VFR BLD AUTO: 28.6 % (ref 35–47)
HGB BLD-MCNC: 8.6 G/DL (ref 11.7–15.7)
IMM GRANULOCYTES # BLD: 0 10E3/UL
IMM GRANULOCYTES NFR BLD: 1 %
LYMPHOCYTES # BLD AUTO: 2.3 10E3/UL (ref 0.8–5.3)
LYMPHOCYTES NFR BLD AUTO: 34 %
MCH RBC QN AUTO: 28.4 PG (ref 26.5–33)
MCHC RBC AUTO-ENTMCNC: 30.1 G/DL (ref 31.5–36.5)
MCV RBC AUTO: 94 FL (ref 78–100)
MONOCYTES # BLD AUTO: 0.4 10E3/UL (ref 0–1.3)
MONOCYTES NFR BLD AUTO: 6 %
NEUTROPHILS # BLD AUTO: 3.6 10E3/UL (ref 1.6–8.3)
NEUTROPHILS NFR BLD AUTO: 53 %
NRBC # BLD AUTO: 0 10E3/UL
NRBC BLD AUTO-RTO: 0 /100
PLATELET # BLD AUTO: 654 10E3/UL (ref 150–450)
RBC # BLD AUTO: 3.03 10E6/UL (ref 3.8–5.2)
WBC # BLD AUTO: 6.7 10E3/UL (ref 4–11)

## 2023-04-11 PROCEDURE — 250N000013 HC RX MED GY IP 250 OP 250 PS 637: Performed by: SURGERY

## 2023-04-11 PROCEDURE — BW111ZZ FLUOROSCOPY OF ABDOMEN AND PELVIS USING LOW OSMOLAR CONTRAST: ICD-10-PCS | Performed by: RADIOLOGY

## 2023-04-11 PROCEDURE — 76080 X-RAY EXAM OF FISTULA: CPT

## 2023-04-11 PROCEDURE — 250N000011 HC RX IP 250 OP 636: Performed by: SPECIALIST

## 2023-04-11 PROCEDURE — 36415 COLL VENOUS BLD VENIPUNCTURE: CPT | Performed by: SURGERY

## 2023-04-11 PROCEDURE — 99232 SBSQ HOSP IP/OBS MODERATE 35: CPT | Performed by: SPECIALIST

## 2023-04-11 PROCEDURE — 120N000001 HC R&B MED SURG/OB

## 2023-04-11 PROCEDURE — 85004 AUTOMATED DIFF WBC COUNT: CPT | Performed by: SURGERY

## 2023-04-11 PROCEDURE — 86140 C-REACTIVE PROTEIN: CPT | Performed by: SURGERY

## 2023-04-11 PROCEDURE — 250N000013 HC RX MED GY IP 250 OP 250 PS 637: Performed by: STUDENT IN AN ORGANIZED HEALTH CARE EDUCATION/TRAINING PROGRAM

## 2023-04-11 PROCEDURE — 250N000011 HC RX IP 250 OP 636: Performed by: NURSE PRACTITIONER

## 2023-04-11 RX ORDER — BISACODYL 10 MG
10 SUPPOSITORY, RECTAL RECTAL ONCE
Status: COMPLETED | OUTPATIENT
Start: 2023-04-11 | End: 2023-04-11

## 2023-04-11 RX ORDER — ACETAMINOPHEN 325 MG/1
650 TABLET ORAL EVERY 6 HOURS PRN
Status: DISCONTINUED | OUTPATIENT
Start: 2023-04-11 | End: 2023-04-12 | Stop reason: HOSPADM

## 2023-04-11 RX ADMIN — ACETAMINOPHEN 975 MG: 325 TABLET, FILM COATED ORAL at 00:02

## 2023-04-11 RX ADMIN — AMPICILLIN SODIUM AND SULBACTAM SODIUM 3 G: 2; 1 INJECTION, POWDER, FOR SOLUTION INTRAMUSCULAR; INTRAVENOUS at 02:36

## 2023-04-11 RX ADMIN — AMPICILLIN SODIUM AND SULBACTAM SODIUM 3 G: 2; 1 INJECTION, POWDER, FOR SOLUTION INTRAMUSCULAR; INTRAVENOUS at 08:16

## 2023-04-11 RX ADMIN — SENNOSIDES AND DOCUSATE SODIUM 2 TABLET: 50; 8.6 TABLET ORAL at 08:17

## 2023-04-11 RX ADMIN — OXYCODONE HYDROCHLORIDE 5 MG: 5 TABLET ORAL at 14:00

## 2023-04-11 RX ADMIN — FERROUS SULFATE TAB 325 MG (65 MG ELEMENTAL FE) 325 MG: 325 (65 FE) TAB at 19:46

## 2023-04-11 RX ADMIN — FERROUS SULFATE TAB 325 MG (65 MG ELEMENTAL FE) 325 MG: 325 (65 FE) TAB at 08:17

## 2023-04-11 RX ADMIN — AMPICILLIN SODIUM AND SULBACTAM SODIUM 3 G: 2; 1 INJECTION, POWDER, FOR SOLUTION INTRAMUSCULAR; INTRAVENOUS at 19:52

## 2023-04-11 RX ADMIN — AMPICILLIN SODIUM AND SULBACTAM SODIUM 3 G: 2; 1 INJECTION, POWDER, FOR SOLUTION INTRAMUSCULAR; INTRAVENOUS at 14:01

## 2023-04-11 RX ADMIN — OXYCODONE HYDROCHLORIDE AND ACETAMINOPHEN 1000 MG: 500 TABLET ORAL at 08:17

## 2023-04-11 RX ADMIN — POLYETHYLENE GLYCOL 3350 17 G: 17 POWDER, FOR SOLUTION ORAL at 08:16

## 2023-04-11 RX ADMIN — Medication 50 MCG: at 08:17

## 2023-04-11 RX ADMIN — ALTEPLASE 2 MG: 2.2 INJECTION, POWDER, LYOPHILIZED, FOR SOLUTION INTRAVENOUS at 19:57

## 2023-04-11 RX ADMIN — ACETAMINOPHEN 650 MG: 325 TABLET ORAL at 19:46

## 2023-04-11 RX ADMIN — OXYCODONE HYDROCHLORIDE 5 MG: 5 TABLET ORAL at 21:25

## 2023-04-11 RX ADMIN — PRENATAL VITAMINS-IRON FUMARATE 27 MG IRON-FOLIC ACID 0.8 MG TABLET 1 TABLET: at 08:17

## 2023-04-11 RX ADMIN — OXYCODONE HYDROCHLORIDE 10 MG: 5 TABLET ORAL at 05:14

## 2023-04-11 RX ADMIN — BISACODYL 10 MG: 10 SUPPOSITORY RECTAL at 08:17

## 2023-04-11 RX ADMIN — ACETAMINOPHEN 975 MG: 325 TABLET, FILM COATED ORAL at 08:16

## 2023-04-11 RX ADMIN — PANTOPRAZOLE SODIUM 40 MG: 40 TABLET, DELAYED RELEASE ORAL at 19:46

## 2023-04-11 ASSESSMENT — ACTIVITIES OF DAILY LIVING (ADL)
ADLS_ACUITY_SCORE: 20

## 2023-04-11 NOTE — PROGRESS NOTES
[facility]    Infectious Disease Progress Note    Date of Service : 04/11/2023     Assessment:  37 year old female who had a C section on 3/5 who was recently hospitalized with a post operative surgical site infection and pelvic abscess in the cul de sac requiring multiple drain placements, cxs were polymicrobial. She received IV antibiotics during hospital and was discharged on oral Augmentin but has been hospitalized due to persistent infection and is now s/p  laparoscopic abdominal washout with drain placement, and debridement of C section wound on 4/8. 1L of purulent fluid was drained and pelvic adhesions were noted. Cxs polymicrobial-bacteroides, peptinophilus asaccharolyticus.Peptinophilus is a vaginal/gut organism though no vaginal fistula noted on CT sinogram     -Pelvic abscess following recent C section. Failed conservative management and is now s/p laparoscopic abdominal washout. Cx with bacteroides, peptinophilus asaccharolyticus. This is a vaginal organism but no fistula noted on sinogram  -Mild leukocytosis  -Anemia  -LE edema     Recommendations  1. CT sinogram noted, no fistula apparent to vagina   2. Continue Unaysn, oral transition to Augmentin at discharge, treat for another 10 days   3. IR managing drain  4. Follow cxs    Janette Hutchins MD    Interval History   Feels ok, no new complaints, tolerating antibiotics without side effects     Physical Exam   Temp: 97.5  F (36.4  C) Temp src: Oral BP: 113/74 Pulse: 63   Resp: 17 SpO2: 98 % O2 Device: None (Room air)    Vitals:    04/08/23 0851   Weight: 62.1 kg (137 lb)     Vital Signs with Ranges  Temp:  [97.5  F (36.4  C)-98.2  F (36.8  C)] 97.5  F (36.4  C)  Pulse:  [63-71] 63  Resp:  [16-17] 17  BP: (102-123)/(59-74) 113/74  SpO2:  [97 %-100 %] 98 %    GENERAL APPEARANCE:  awake  EYES: Eyes grossly normal to inspection  RESP: non labored breathing  ABDOMEN: soft, wound vac on C section surgical site, SYMONE drain with serous  output, gluteal drain with purulent output  MS: extremities normal  SKIN: no suspicious lesions or rashes       Other:    Medications       ampicillin-sulbactam  3 g Intravenous Q6H     ferrous sulfate  325 mg Oral BID     pantoprazole  40 mg Oral QPM     polyethylene glycol  17 g Oral BID     prenatal multivitamin w/iron  1 tablet Oral Daily     senna-docusate  2 tablet Oral Daily     sodium chloride (PF)  3 mL Intracatheter Q8H     vitamin C  1,000 mg Oral Daily     vitamin D3  50 mcg Oral Daily       Data   All microbiology laboratory data reviewed.  Recent Labs   Lab Test 04/09/23  0705 04/04/23  0741 04/03/23  0748 04/02/23  0713 04/01/23  0755   WBC 11.9*  --   --  9.9 10.5   HGB 8.2* 8.1* 8.0* 7.9* 8.2*   HCT 27.0*  --   --  25.3* 25.2*   MCV 95  --   --  90 89   *  --   --  502* 462*     Recent Labs   Lab Test 04/09/23  0705 04/04/23  0741 04/03/23  0748   CR 0.71 0.81 0.68     Microbiology  4/8 abdominal cx     Abdomen; Abscess    0 Result Notes  Culture Culture in progress       Isolated in broth only Gram negative bacilli Abnormal     On day 2 of incubation   Isolated in broth only Gram positive cocci Abnormal     On day 2 of incubation        Abdomen; Abscess    0 Result Notes  Culture Culture in progress       4+ Bacteroides fragilis Abnormal

## 2023-04-11 NOTE — PROGRESS NOTES
Reason for admission: Intra-abdominal abscess post-procedure   Surgical Procedure/s: Diagnostic laparoscopy, laparoscopic lavage with abdominal drain and  wound vac placement.  POD#:  3  Mental Status: x4  Activity: Ind  Diet: Regular  Pain: Controlled  Urination: Continent. Uses the bathroom  Tele/Restraints/Iso: NA  LDA: PIV SL, Wound vac at 75 mmhg continous.  Expected D/C Date: TBD w/ Home wound vac service.  Other Info: Denies n/v. Passing a lot of gas but no BM yet. MD changed wound vac today.  Intra-operative cultures growing gram positive cocci and gram negative bacilli on gram stain. Still on IV Unasyn and plan to transition to Augmentin at discharge. CT sinogram done today shows posterior fluid collection which is smaller and which communicates with the anterior drain. Per IR recommended alteplase use in the posterior drain for a couple doses to help with liquefying the abscess collection.

## 2023-04-11 NOTE — PROGRESS NOTES
"OB PROGRESS NOTE-- POD#3    S: Feels she is improving each day.  Pain well controlled on po meds, still taking oxycodone.  Tolerating general diet.  Voiding freely.  No BM since surgery, getting suppository today.  Ambulating.    O: /74 (BP Location: Right arm)   Pulse 63   Temp 97.5  F (36.4  C) (Oral)   Resp 17   Ht 1.626 m (5' 4\")   Wt 62.1 kg (137 lb)   SpO2 98%   Breastfeeding No   BMI 23.52 kg/m     Gen: AO, NAD  Abd: soft, min distended, min generalized tenderness  Incisions: wound vac secure, no surrounding erythema/induration. Anterior drains with serosanguinous output.  Posterior drain with purulent appearing output.  Ext: NT w/o edema    A/P: POD #3 s/p I&D and Laparoscopic lavage for purulent peritonitis by General Surgery  - Appreciate excellent care/assistance with patient management from General Surgery, IR, ID  - Support provided, discussed that my office can fill out any paperwork for additional medical leave  - Dispo per General Surgery team.  Will plan short term follow up in OB clinic within 1-2 weeks of discharge.    Princess Guzman MD      "

## 2023-04-11 NOTE — PROGRESS NOTES
"General Surgery Progress Note    Subjective: Allyson reports she is doing ok. Anterior SYMONE drains are most painful.     Objective: /74 (BP Location: Right arm)   Pulse 63   Temp 97.5  F (36.4  C) (Oral)   Resp 17   Ht 1.626 m (5' 4\")   Wt 62.1 kg (137 lb)   SpO2 98%   Breastfeeding No   BMI 23.52 kg/m    Gen: alert, no distress  CV: RRR  Pulm: nonlabored breathing  Abd: soft, incisions look fine. SYMONE drains anteriorly are serosanguinous with minimal output. Right buttock drain remains purulent but fluid more thin.   Ext: WWP    Procedure: wound vac changed. Prior dressing removed. Wound looks healthy with granulation tissue throughout. Posterior tissue much improved from prior. No purulent fluid. No tunneling. Wound measures 5.5cm x 2.5cm x 2cm today. New black granufoam sponge placed.     Assessment/Plan:   Aicha Draper  is a 37 year old female admitted with large abdominal abscess and pelvic abscess 4 weeks post  who underwent laparoscopic lavage, drain placement and wound vac placement. Improving. Cultures sensitivities pending, switched to Unasyn per Dr. Hutchins. apprecaite recommendations/care.   - continue wound vac, plan for discharge with vac in place, will need 2-4 weeks.   - CT sinogram through perineal drain to assess pelvic abscess which was unable to be safely accessed intraoperatively.   - possibly home later today but more likely tomorrow  - follow up with me Friday for vac change in clinic.     Autumn Arteaga MD  [facility]  [phone]              "

## 2023-04-11 NOTE — PLAN OF CARE
A&Ox4. VSS on RA. Pain managed w/ Tylenol and Oxy. Wound vac to abd at 75mmHg continuous. Bilat abd Symone x2 with serosanguinous output. R buttock SYMONE w/ purulent drainage, flushed q8hrs. PIV SL w/ intermittent Unasyn. Tolerating regular diet. BS +, passing gas. Voiding adequately, purewick utilized overnight due to urgency. Up ambulating independently. Plan for vac change today.

## 2023-04-11 NOTE — PROGRESS NOTES
Care Management Follow Up    Length of Stay (days): 3    Expected Discharge Date: 04/12/2023     Concerns to be Addressed:       Patient plan of care discussed at interdisciplinary rounds: Yes    Anticipated Discharge Disposition: Home, Home Care     Anticipated Discharge Services: None  Anticipated Discharge DME: Wound Vac -confirmed with MD, Dr. Arteaga today will need at home  Patient/family educated on Medicare website which has current facility and service quality ratings:    Education Provided on the Discharge Plan:    Patient/Family in Agreement with the Plan: yes    Referrals Placed by CM/SW:    Private pay costs discussed: Not applicable    Additional Information:  Completed and signed wound vac orders and supporting documents faxed to [employer] at [phone] for processing.     1125 awaiting vac approval. Updated patient regarding discharge process. [facility] accepted HHC referral with anticipated SOC for Monday, April 17th (providers please note this date on orders for HHC) as patient is seeing surgeon in clinic on Friday , April 14th for next vac change. Anticipated discharge tomorrow.     Alejandra Robles RN   [facility] [place]   Phone [phone]

## 2023-04-11 NOTE — CONSULTS
"    IR consult request on Allyson Draper, \" 4 weeks prior with many drains by IR now s/p washout laparoscopically. still has right buttock drain in culdusac (couldnt access lap), sinogram through this drain please.\"    Patient is on CT schedule today 23 for a CT sinogram for further evaluation.     Please contact the IR department at [phone] for procedural related questions.     Discussed with Dr Bee and Dr Snow.    Total time: 15 minutes.     Thanks, Blanka [facility] Interventional Radiology CNP ([phone]) (phone [phone])         "

## 2023-04-11 NOTE — PROGRESS NOTES
Aicha Draper is a 37 year old woman who had a C section on 3/5 who was recently hospitalized with a post operative surgical site infection and pelvic abscess in the cul de sac requiring multiple drain placements well known to IR who has been hospitalized due to persistent infection and is now s/p  laparoscopic abdominal washout with drain placement, and debridement of C section wound. 1L of purulent fluid was drained and pelvic adhesions were noted. Cxs with GPC and GNB likely same organisms as before.    CT sinogram done today which demonstrated the posterior fluid collection is smaller and which communicates with the anterior drain. Reviewed with IR Dr Bee who recommended alteplase use in the posterior drain for a couple doses to help with liquefying the abscess collection and is ordered to be given today/tonight.     Discussed with Allyson and explained CT scan. She is eager to do anything that will improve her health so she can get home.     Total time: 20 minutes    Thanks, Blanka [facility] Interventional Radiology CNP ([phone]) (phone [phone])

## 2023-04-12 VITALS
RESPIRATION RATE: 16 BRPM | BODY MASS INDEX: 23.39 KG/M2 | OXYGEN SATURATION: 97 % | WEIGHT: 137 LBS | HEIGHT: 64 IN | HEART RATE: 64 BPM | DIASTOLIC BLOOD PRESSURE: 71 MMHG | SYSTOLIC BLOOD PRESSURE: 120 MMHG | TEMPERATURE: 98.2 F

## 2023-04-12 LAB
BACTERIA ABSC ANAEROBE+AEROBE CULT: ABNORMAL
BACTERIA ABSC ANAEROBE+AEROBE CULT: ABNORMAL

## 2023-04-12 PROCEDURE — 250N000013 HC RX MED GY IP 250 OP 250 PS 637: Performed by: SURGERY

## 2023-04-12 PROCEDURE — 250N000011 HC RX IP 250 OP 636: Performed by: NURSE PRACTITIONER

## 2023-04-12 PROCEDURE — 99232 SBSQ HOSP IP/OBS MODERATE 35: CPT | Performed by: INTERNAL MEDICINE

## 2023-04-12 PROCEDURE — 250N000013 HC RX MED GY IP 250 OP 250 PS 637: Performed by: STUDENT IN AN ORGANIZED HEALTH CARE EDUCATION/TRAINING PROGRAM

## 2023-04-12 PROCEDURE — 250N000011 HC RX IP 250 OP 636: Performed by: SPECIALIST

## 2023-04-12 RX ORDER — OXYCODONE HYDROCHLORIDE 5 MG/1
5 TABLET ORAL EVERY 4 HOURS PRN
Qty: 30 TABLET | Refills: 0 | Status: SHIPPED | OUTPATIENT
Start: 2023-04-12 | End: 2023-05-14

## 2023-04-12 RX ORDER — POLYETHYLENE GLYCOL 3350 17 G/17G
17 POWDER, FOR SOLUTION ORAL 2 TIMES DAILY
Qty: 510 G | Refills: 1 | Status: SHIPPED | OUTPATIENT
Start: 2023-04-12 | End: 2023-05-14

## 2023-04-12 RX ADMIN — ALTEPLASE 2 MG: 2.2 INJECTION, POWDER, LYOPHILIZED, FOR SOLUTION INTRAVENOUS at 08:49

## 2023-04-12 RX ADMIN — AMPICILLIN SODIUM AND SULBACTAM SODIUM 3 G: 2; 1 INJECTION, POWDER, FOR SOLUTION INTRAMUSCULAR; INTRAVENOUS at 08:49

## 2023-04-12 RX ADMIN — POLYETHYLENE GLYCOL 3350 17 G: 17 POWDER, FOR SOLUTION ORAL at 08:49

## 2023-04-12 RX ADMIN — OXYCODONE HYDROCHLORIDE 5 MG: 5 TABLET ORAL at 13:31

## 2023-04-12 RX ADMIN — AMPICILLIN SODIUM AND SULBACTAM SODIUM 3 G: 2; 1 INJECTION, POWDER, FOR SOLUTION INTRAMUSCULAR; INTRAVENOUS at 01:43

## 2023-04-12 RX ADMIN — OXYCODONE HYDROCHLORIDE AND ACETAMINOPHEN 1000 MG: 500 TABLET ORAL at 08:50

## 2023-04-12 RX ADMIN — SENNOSIDES AND DOCUSATE SODIUM 2 TABLET: 50; 8.6 TABLET ORAL at 08:50

## 2023-04-12 RX ADMIN — PRENATAL VITAMINS-IRON FUMARATE 27 MG IRON-FOLIC ACID 0.8 MG TABLET 1 TABLET: at 08:50

## 2023-04-12 RX ADMIN — ACETAMINOPHEN 650 MG: 325 TABLET ORAL at 01:42

## 2023-04-12 RX ADMIN — ACETAMINOPHEN 650 MG: 325 TABLET ORAL at 11:35

## 2023-04-12 RX ADMIN — FERROUS SULFATE TAB 325 MG (65 MG ELEMENTAL FE) 325 MG: 325 (65 FE) TAB at 08:50

## 2023-04-12 RX ADMIN — AMPICILLIN SODIUM AND SULBACTAM SODIUM 3 G: 2; 1 INJECTION, POWDER, FOR SOLUTION INTRAMUSCULAR; INTRAVENOUS at 13:30

## 2023-04-12 RX ADMIN — OXYCODONE HYDROCHLORIDE 5 MG: 5 TABLET ORAL at 07:18

## 2023-04-12 RX ADMIN — Medication 50 MCG: at 08:50

## 2023-04-12 ASSESSMENT — ACTIVITIES OF DAILY LIVING (ADL)
ADLS_ACUITY_SCORE: 20

## 2023-04-12 NOTE — PROGRESS NOTES
[facility]    Infectious Disease Progress Note    Date of Service : 04/12/2023     Assessment:  37 year old female who had a C section on 3/5 who was recently hospitalized with a post operative surgical site infection and pelvic abscess in the cul de sac requiring multiple drain placements, cxs were polymicrobial. She received IV antibiotics during hospital and was discharged on oral Augmentin but has been hospitalized due to persistent infection and is now s/p  laparoscopic abdominal washout with drain placement, and debridement of C section wound on 4/8. 1L of purulent fluid was drained and pelvic adhesions were noted. Cxs polymicrobial-bacteroides, peptinophilus asaccharolyticus.Peptinophilus is a vaginal/gut organism though no vaginal fistula noted on CT sinogram     -Pelvic abscess following recent C section. Failed conservative management and is now s/p laparoscopic abdominal washout. Cx with bacteroides, peptinophilus asaccharolyticus. This is a vaginal organism but no fistula noted on sinogram  -Mild leukocytosis  -Anemia  -LE edema     Recommendations  1. CT sinogram noted, no fistula apparent to vagina   2. Continue Unaysn, oral transition to Augmentin at discharge,   3. IR managing drain 1 area was not surgically drained so having to be drained with percutaneous drainage so I would plan on at least 2 more weeks of oral Augmentin 875 twice daily at discharge and possibly longer if the drain is left in place for a longer period of time  4.  cxs Same although some late broth growth suspect to be the same anaerobes but will follow-up in this culture if she is discharged today and make sure coverage is acceptable    Sandoval Enriquez MD    Interval History   Feels ok, no new complaints, tolerating antibiotics without side effects     Physical Exam   Temp: 97.8  F (36.6  C) Temp src: Oral BP: 136/79 Pulse: 67   Resp: 16 SpO2: 99 % O2 Device: None (Room air)    Vitals:    04/08/23 0851    Weight: 62.1 kg (137 lb)     Vital Signs with Ranges  Temp:  [97.7  F (36.5  C)-98.7  F (37.1  C)] 97.8  F (36.6  C)  Pulse:  [67-86] 67  Resp:  [15-16] 16  BP: (112-136)/(69-80) 136/79  SpO2:  [96 %-100 %] 99 %    GENERAL APPEARANCE:  awake  EYES: Eyes grossly normal to inspection  RESP: non labored breathing  ABDOMEN: soft, wound vac on C section surgical site, SYMONE drain with serous output, gluteal drain with purulent output  MS: extremities normal  SKIN: no suspicious lesions or rashes       Other:    Medications       ampicillin-sulbactam  3 g Intravenous Q6H     ferrous sulfate  325 mg Oral BID     pantoprazole  40 mg Oral QPM     polyethylene glycol  17 g Oral BID     prenatal multivitamin w/iron  1 tablet Oral Daily     senna-docusate  2 tablet Oral Daily     sodium chloride (PF)  3 mL Intracatheter Q8H     vitamin C  1,000 mg Oral Daily     vitamin D3  50 mcg Oral Daily       Data   All microbiology laboratory data reviewed.  Recent Labs   Lab Test 04/11/23  1842 04/09/23  0705 04/04/23  0741 04/03/23  0748 04/02/23  0713   WBC 6.7 11.9*  --   --  9.9   HGB 8.6* 8.2* 8.1*   < > 7.9*   HCT 28.6* 27.0*  --   --  25.3*   MCV 94 95  --   --  90   * 658*  --   --  502*    < > = values in this interval not displayed.     Recent Labs   Lab Test 04/09/23  0705 04/04/23  0741 04/03/23  0748   CR 0.71 0.81 0.68     Microbiology  4/8 abdominal cx     Abdomen; Abscess    0 Result Notes  Culture Culture in progress       Isolated in broth only Gram negative bacilli Abnormal     On day 2 of incubation   Isolated in broth only Gram positive cocci Abnormal     On day 2 of incubation        Abdomen; Abscess    0 Result Notes  Culture Culture in progress       4+ Bacteroides fragilis Abnormal

## 2023-04-12 NOTE — PROGRESS NOTES
"Surgery Note    Pt doing well. CT sinogram results reviewed.     O:/79 (BP Location: Right arm)   Pulse 67   Temp 97.8  F (36.6  C) (Oral)   Resp 16   Ht 1.626 m (5' 4\")   Wt 62.1 kg (137 lb)   SpO2 98%   Breastfeeding No   BMI 23.52 kg/m    Abd: right anterior drain serosanguinous. Left anterior drain removed. Right buttock drain more thin purulent drainage. Wound vac changed - wound measures 5cm x2cm x 2cm.     A/P: 37yof with post op  wound infection and intraabdominal abscesses s/p lap washout, drain placement and wound vac. Improving.   - home today  - will send out on Augmentin per ID recs - appreciate assistance  - follow up with me on Friday for vac change, possible drain removals  - fully instructed  - follow up with GYN as directed    Autumn Arteaga MD  [facility]  [phone]      "

## 2023-04-12 NOTE — PROGRESS NOTES
"GYN Note    Continuing to feel better. Anxious to go home and stay home.      /79 (BP Location: Right arm)   Pulse 67   Temp 97.8  F (36.6  C) (Oral)   Resp 16   Ht 1.626 m (5' 4\")   Wt 62.1 kg (137 lb)   SpO2 99%   Breastfeeding No   BMI 23.52 kg/m      Discussed [facility] follow up as secondary to wound follow, can be done as able. Discussed FMLA, will have office start to work on papers. Discussed BC, planning condoms    Appreciate excellent care from Surgery, Hospitalist, IR and ID teams. Discharge per those teams    " Chief Complaint:  Amanda DAMON Arreoal is here today for   Chief Complaint   Patient presents with   • Physical     Patient has a list        Medications: medications verified and updated  Refills needed today?  NO  denies Latex allergy or sensitivity  Tobacco history: verified  Health Maintenance Due   Topic Date Due   • HPV (Female) Vaccine (1 of 3 - Female 3-dose series) 04/26/2005   • DTaP/Tdap/Td Vaccine (1 - Tdap) 04/26/2013   • Cervical Cancer Screening  04/26/2015     Patient is due for topics as listed above, she wishes to discuss with provider.  Health Maintenance Summary     Topic Due On Due Status Completed On    IMMUNIZATION - HPV Apr 26, 2005 Overdue     PAP SMEAR - CERVICAL CANCER SCREENING Apr 26, 2015 Overdue     Immunization - TDAP Pregnancy  Hidden     IMMUNIZATION - DTaP/Tdap/Td Apr 26, 2013 Overdue     Immunization-Influenza Sep 1, 2018 Not Due Nov 2, 2015          Patient would like communication of their results via:        Cell Phone:   Telephone Information:   Mobile [phone]     Okay to leave a message containing results? Yes

## 2023-04-12 NOTE — PROGRESS NOTES
Care Management Discharge Note    Discharge Date: 04/12/2023       Discharge Disposition: Home    Discharge Services: None    Discharge DME: Wound Vac  Discharge Transportation: family or friend will provide    Private pay costs discussed: Not applicable    PAS Confirmation Code:    Patient/family educated on Medicare website which has current facility and service quality ratings:      Education Provided on the Discharge Plan:    Persons Notified of Discharge Plans: patient, RN, MD  Patient/Family in Agreement with the Plan: yes    Handoff Referral Completed: No    Additional Information:  [facility] wound vac serial number [id number] delivered to patient in their room. Proof of delivery signed and faxed to [facility] at [phone].    Writer discussed wound vac changes for discharge with patient and MD. Patient preference for all changes to be done at MD office since being seen Friday and next Tuesday. MD, Dr. Arteaga called and in agreement with plan for vac changes at MD office. Patient request [facility] referral be cancelled. Writer called [facility] at [phone] and spoke to Gee in intake and cancelled referral.    Alejandra Robles RN   [facility]   Phone [phone]

## 2023-04-12 NOTE — DISCHARGE SUMMARY
[facility] Discharge Summary    Aicha Draper MRN# [id number]   Age: 37 year old YOB: 1985     Date of Admission:  4/8/2023  Date of Discharge:  4/12/2023  Admitting Provider:  Autumn Arteaga MD  Discharge Provider:  Autumn Arteaga  Discharging Service: General Surgery     Primary Provider: Kamila Roland  Primary Care Physician Phone Number: [phone]          Admission Diagnoses:   Principle Diagnosis: Intra-abdominal abscess post-procedure [T81.43XA]  Intra-abdominal infection [B99.9]  Secondary Diagnoses:          Discharge Diagnosis:   Intra-abdominal abscess post-procedure [T81.43XA]          Procedures:   Procedure(s): Laparoscopic abdominal washout with drain placement and wound vac placement            Discharge Medications:     Current Discharge Medication List      START taking these medications    Details   polyethylene glycol (MIRALAX) 17 GM/Dose powder Take 17 g by mouth 2 times daily  Qty: 510 g, Refills: 1    Associated Diagnoses: Intra-abdominal infection         CONTINUE these medications which have CHANGED    Details   amoxicillin-clavulanate (AUGMENTIN) 875-125 MG tablet Take 1 tablet by mouth 2 times daily for 7 days  Qty: 14 tablet, Refills: 0    Associated Diagnoses: Intra-abdominal infection      oxyCODONE (ROXICODONE) 5 MG tablet Take 1 tablet (5 mg) by mouth every 4 hours as needed for moderate pain  Qty: 30 tablet, Refills: 0    Associated Diagnoses: Intra-abdominal infection         CONTINUE these medications which have NOT CHANGED    Details   acetaminophen (TYLENOL) 500 MG tablet Take 2 tablets (1,000 mg) by mouth every 6 hours as needed for mild pain    Associated Diagnoses: Pelvic abscess in female      calcium carbonate (TUMS) 500 MG chewable tablet Take 2 chew tab by mouth 4 times daily as needed for heartburn      ferrous sulfate (FEROSUL) 325 (65 Fe) MG tablet Take 1 tablet (325 mg) by mouth 2 times daily  Qty: 50 tablet, Refills: 0     Associated Diagnoses: Anemia due to blood loss, acute      fish oil-omega-3 fatty acids 1000 MG capsule Take 2 g by mouth every evening      ibuprofen (ADVIL/MOTRIN) 200 MG tablet Take 2 tablets (400 mg) by mouth every 6 hours as needed for pain    Associated Diagnoses: Pelvic abscess in female      omeprazole (PRILOSEC) 20 MG DR capsule Take 20 mg by mouth every evening      Prenatal Vit-Fe Fumarate-FA (PRENATAL MULTIVITAMIN W/IRON) 27-0.8 MG tablet Take 1 tablet by mouth daily      senna-docusate (SENOKOT-S/PERICOLACE) 8.6-50 MG tablet Take 2 tablets by mouth daily  Qty: 60 tablet, Refills: 0    Associated Diagnoses: Pelvic abscess in female      sodium chloride, PF, (SALINE FLUSH) 0.9% PF flush 10 ml NS flush twice a day to front drain and once a day to back drain  Qty: 300 mL, Refills: 1    Comments: Please cancel previous order  of flushes  Associated Diagnoses: Pelvic abscess in female      vitamin C (ASCORBIC ACID) 1000 MG TABS Take 1,000 mg by mouth daily      vitamin D3 (CHOLECALCIFEROL) 50 mcg (2000 units) tablet Take 1 tablet by mouth daily                 Allergies:         Allergies   Allergen Reactions     No Known Drug Allergies              Brief History of Illness:   Aicha Draper had a prolonged labor and underwent . She developed a post op incisional infection and had this opened in clinic. She was admitted to hospital due to lower extremity edema and was found to have intraabdominal abscesses. IR placed several drains and upsized drains over time but pt was not improving. Her OBGYN, Dr. Nuñez called to discuss options and I recommended surgery.     After discussing the risks, benefits, and possible complications, informed consent was obtained and the patient underwent the above procedure.  There were no complications.  Please see the Operative Report for full details.           Hospital Course:   Aicha Draper's hospital course was unremarkable.  She recovered as anticipated and  "experienced no post-operative complications.     On the date of discharge, the patient was discharged to home in stable condition and afebrile.  She verbalized understanding of all discharge instructions and felt comfortable with the discharge plan.  She was asked to call with any further questions or concerns.         Condition on Discharge:      Discharge condition: Stable   Discharge vitals: Blood pressure 136/79, pulse 67, temperature 97.8  F (36.6  C), temperature source Oral, resp. rate 16, height 1.626 m (5' 4\"), weight 62.1 kg (137 lb), SpO2 98 %, not currently breastfeeding.           Discharge Disposition:   Discharged to home          Discharge Instructions and Follow-Up:      Aicha DAVIDSON Draper was asked to follow up with surgical team on Friday.       Autumn Arteaga MD  [facility]          "

## 2023-04-12 NOTE — PROGRESS NOTES
Pt is alert and oriented,tolerates diet, pain controled  With oral pain medications, SYMONE drains in place, buttock drain flushed  Per orders.  Pt will discharge to home. Pt given written and verbal discharge instructions. Pt  Knows that follow up care is needed and who to call with any questions  Or concerns, all medications were reviewed and all questions answered. Pt will leave with wound vac and drains, teaching complete.

## 2023-04-12 NOTE — PROGRESS NOTES
Allyson seen briefly today. She is being discharged home and ready to leave. She has no questions about care of the posterior drain and will flush it 3 times a day per Dr Arteaga's instructions and will follow up with her on Friday 4/14.     IR will follow.     Thanks, Blanka [facility] Interventional Radiology CNP ([phone]) (phone [phone])

## 2023-04-12 NOTE — PROGRESS NOTES
Care Management Follow Up    Length of Stay (days): 4    Expected Discharge Date: 04/12/2023     Concerns to be Addressed:       Patient plan of care discussed at interdisciplinary rounds: Yes    Anticipated Discharge Disposition: Home, Home Care     Anticipated Discharge Services: None  Anticipated Discharge DME: Wound Vac Patient/family educated on Medicare website which has current facility and service quality ratings:    Education Provided on the Discharge Plan:    Patient/Family in Agreement with the Plan: yes    Referrals Placed by CM/SW:  DME  Private pay costs discussed: Not applicable    Additional Information:  Wound vac approved from [facility]. [facility] secured will need to add start of care date as 4/17 with next wound vac change.     Await provider medical clearance for discharge.       Alejandra Robles RN   [facility]   Phone [phone]

## 2023-04-12 NOTE — PLAN OF CARE
Goal Outcome Evaluation:  A&Ox4. VSS on RA. Pain managed w/ Tylenol and Oxy. Wound vac to abd at 75mmHg continuous. Bilat abd Symone x2 with serosanguinous output. R buttock SYMONE w/ purulent/yellow drainage, flushed q8hrs. Alteplase instilled per orders. PIV SL w/ intermittent Unasyn. Transition to Augmentin at discharge.Tolerating regular diet. BS +, passing gas. Voiding adequately. Up ambulating independently. Plan for possible discharge home today w/ wound vac. C planned to start 4/17.

## 2023-04-13 ASSESSMENT — ENCOUNTER SYMPTOMS: FEVER: 1

## 2023-04-14 ENCOUNTER — OFFICE VISIT (OUTPATIENT)
Dept: SURGERY | Facility: CLINIC | Age: 38
End: 2023-04-14
Payer: COMMERCIAL

## 2023-04-14 DIAGNOSIS — Z09 SURGERY FOLLOW-UP EXAMINATION: Primary | ICD-10-CM

## 2023-04-14 PROCEDURE — 99024 POSTOP FOLLOW-UP VISIT: CPT | Performed by: SURGERY

## 2023-04-15 LAB
BACTERIA ABSC ANAEROBE+AEROBE CULT: ABNORMAL
BACTERIA ABSC ANAEROBE+AEROBE CULT: ABNORMAL

## 2023-04-17 NOTE — PROGRESS NOTES
Surgery Postoperative Note    S: Just reports she has been improving.  She has had minimal drain output from both the abdominal drain and the buttock drain.  Wound VAC has not had any issues over the weekend.    Abdomen: Wound VAC removed and wound is looking good.  There is healthy granulation tissue across the base and sides.  The wound now measures 5 cm x 1 cm x 1 cm.     Abdominal SYMONE drain removed uneventfully and right buttock drain removed uneventfully.    A/P  Aicha Draper is recovering from a laparoscopic abdominal wash out and drain placement with wound vac placement after  with post op incisional infection and intraabdominal abscesses.    Plan for wound VAC change in clinic with one of our PAs on Friday.  I will see her back next Tuesday for additional wound VAC change and to determine how long will need to continue the VAC for treatment.      Thank you for the opportunity to help in her care.    Autumn Arteaga MD  [facility]  [phone]    Please route or send letter to:  Primary Care Provider (PCP) and Referring Provider

## 2023-04-18 ENCOUNTER — OFFICE VISIT (OUTPATIENT)
Dept: SURGERY | Facility: CLINIC | Age: 38
End: 2023-04-18
Payer: COMMERCIAL

## 2023-04-18 DIAGNOSIS — Z09 SURGERY FOLLOW-UP EXAMINATION: Primary | ICD-10-CM

## 2023-04-18 PROCEDURE — 99024 POSTOP FOLLOW-UP VISIT: CPT | Performed by: SURGERY

## 2023-04-18 NOTE — LETTER
2023      Kamila Roland MD      RE:   Aicha Draper 1985      Dear Colleague,    Thank you for referring your patient, Aicha Draper, to [facility] at [facility] Please see a copy of my visit note below.    Just reports she has been improving.  She has had minimal drain output from both the abdominal drain and the buttock drain.  Wound VAC has not had any issues over the weekend.     Abdomen: Wound VAC removed and wound is looking good.  There is healthy granulation tissue across the base and sides.  The wound now measures 5 cm x 1 cm x 1 cm.      Abdominal SYMONE drain removed uneventfully and right buttock drain removed uneventfully.     A/P  Aicha Draper is recovering from a laparoscopic abdominal wash out and drain placement with wound vac placement after  with post op incisional infection and intraabdominal abscesses.     Plan for wound VAC change in clinic with one of our PAs on Friday.  I will see her back next Tuesday for additional wound VAC change and to determine how long will need to continue the VAC for treatment    Again, thank you for allowing me to participate in the care of your patient.      Sincerely,      Autumn Arteaga MD

## 2023-04-21 ENCOUNTER — OFFICE VISIT (OUTPATIENT)
Dept: SURGERY | Facility: CLINIC | Age: 38
End: 2023-04-21
Payer: COMMERCIAL

## 2023-04-21 DIAGNOSIS — Z09 SURGERY FOLLOW-UP EXAMINATION: Primary | ICD-10-CM

## 2023-04-21 PROCEDURE — 99024 POSTOP FOLLOW-UP VISIT: CPT

## 2023-04-21 NOTE — PROGRESS NOTES
General Surgery Office Visit Note  Patient Name: Aicha Draper  Today's Date: 2023    SUBJECTIVE  Allyson reports that she is overall doing well. She continues to improve, is glad to have the drains out. Still having some scant drainage from her right-sided abdominal drain site. Wound VAC has been functioning fine, but this morning as she was parking for her appointment here it started to alarm. She is taking tylenol occasionally for mild abdominal pain.        OBJECTIVE  General: well-developed, well-nourished female who is awake, alert, appears comfortable. Oriented x 3  Respiratory: non-labored breathing  Abdomen: soft, nondistended, no significant tenderness to palpation.  Wounds:  - Left-sided drain site is clean and dry, starting to scab over. Right-sided site is slightly moist, I am able to express a very scant amount of slightly cloudy serous fluid. Surrounding skin is intact without erythema or warmth. Wound is cleansed, bacitracin and dry dressing reapplied  - Wound VAC is removed from low abdomen, final sponge removed using topical lidocaine and saline. Wound is making excellent progress. Healthy pink granulation tissue is present, no slough or fluid. Wound measurement 4.5cm x 1cm x 1cm today. Surrounding skin is intact without erythema or breakdown. Skin is prepped, new VAC sponge and dressing applied with bridging to left side. VAC is connected, good suction is obtained without leak.      ASSESSMENT/PLAN  Aicha Draper is recovering from a laparoscopic abdominal washout with drain placement and wound vac placement following  section with postoperative incisional infection and intra-abdominal abscesses  - Plan for repeat wound VAC change in clinic on Tuesday with Dr. Arteaga. We will continue to assess wound at each visit to determine length of VAC therapy. Wound is making good progress.  - For right-sided drain site, patient will continue cleansing it with VASHE at home. I provided  her with some bacitracin to apply topically, change dressing over it daily. She will call if any signs/symptoms of infection or increasing drainage.  - Call as-needed in the meantime          iLng Castrejon PA-C  [facility]  [phone]          Please route note to:  Primary Care Provider (PCP)

## 2023-04-25 ENCOUNTER — OFFICE VISIT (OUTPATIENT)
Dept: SURGERY | Facility: CLINIC | Age: 38
End: 2023-04-25
Payer: COMMERCIAL

## 2023-04-25 DIAGNOSIS — Z09 SURGERY FOLLOW-UP EXAMINATION: Primary | ICD-10-CM

## 2023-04-25 PROCEDURE — 99024 POSTOP FOLLOW-UP VISIT: CPT | Performed by: SURGERY

## 2023-04-25 NOTE — LETTER
2023          Kamila Roland MD  [address] [facility] [address]  [place] [address]      RE:   Aicha Draper 1985      Dear Colleague,    Thank you for referring your patient, Aicha Draper, to [facility] at [facility] Please see a copy of my visit note below.    Just reports she has been doing well.  She has not had any issues with the wound VAC.  She has had a small amount of drainage from the right abdominal SYMONE drain site.     Abdomen: Lower midline wound now measures 4.5 cm x 0.5 cm x 1 cm and there is healthy granulation tissue across the base.  A new wound VAC was placed without issues.     A/P  Aicha Draper is recovering from a laparoscopic abdominal wash out and drain placement with wound vac placement after  with post op incisional infection and intraabdominal abscesses.      Plan to continue the wound VAC for another 1 to 2 weeks pending healing.  I will see her back on Friday to change the VAC.    Again, thank you for allowing me to participate in the care of your patient.      Sincerely,      Autumn Arteaga MD

## 2023-04-27 NOTE — PROGRESS NOTES
Surgery Postoperative Note    S: Just reports she has been doing well.  She has not had any issues with the wound VAC.  She has had a small amount of drainage from the right abdominal SYMONE drain site.    Abdomen: Lower midline wound now measures 4.5 cm x 0.5 cm x 1 cm and there is healthy granulation tissue across the base.  A new wound VAC was placed without issues.    A/P  Aicha Draper is recovering from a laparoscopic abdominal wash out and drain placement with wound vac placement after  with post op incisional infection and intraabdominal abscesses.     Plan to continue the wound VAC for another 1 to 2 weeks pending healing.  I will see her back on Friday to change the VAC.    Thank you for the opportunity to help in her care.    Autumn Arteaga MD  [facility]  [phone]    Please route or send letter to:  Primary Care Provider (PCP) and Referring Provider

## 2023-04-28 ENCOUNTER — OFFICE VISIT (OUTPATIENT)
Dept: SURGERY | Facility: CLINIC | Age: 38
End: 2023-04-28
Payer: COMMERCIAL

## 2023-04-28 DIAGNOSIS — Z09 SURGERY FOLLOW-UP EXAMINATION: Primary | ICD-10-CM

## 2023-04-28 PROCEDURE — 99024 POSTOP FOLLOW-UP VISIT: CPT | Performed by: SURGERY

## 2023-04-28 NOTE — LETTER
May 1, 2023          Kamila Roland MD  [address] [facility] [address]  [place] [address]      RE:   Aicha Draper 1985      Dear Colleague,    Thank you for referring your patient, Aicha Draper, to [facility] at [place] location. Please see a copy of my visit note below.    Surgery Post op Note     Just reports she has been doing fine with the VAC since Tuesday no new concerns.     O: The wound VAC on the lower  scar was removed and the incision now measures 2.5 cm x 0.5 cm x 1 cm.  There is healthy granulation tissue present at the base.  No infection concern.  New wound VAC placed.     A/P: We discussed the option of transitioning to packing versus continuing the wound VAC through next Tuesday and I think this is a good option as it is still 1 cm deep.  I will see her for follow-up next Tuesday to reassess.    Again, thank you for allowing me to participate in the care of your patient.      Sincerely,      Autumn Arteaga MD

## 2023-04-28 NOTE — PROGRESS NOTES
Surgery Post op Note    Just reports she has been doing fine with the VAC since Tuesday no new concerns.    O: The wound VAC on the lower  scar was removed and the incision now measures 2.5 cm x 0.5 cm x 1 cm.  There is healthy granulation tissue present at the base.  No infection concern.  New wound VAC placed.    A/P: We discussed the option of transitioning to packing versus continuing the wound VAC through next Tuesday and I think this is a good option as it is still 1 cm deep.  I will see her for follow-up next Tuesday to reassess.    Autumn Arteaga MD  [facility]  [phone]

## 2023-05-02 ENCOUNTER — HOSPITAL ENCOUNTER (OUTPATIENT)
Dept: CT IMAGING | Facility: CLINIC | Age: 38
Discharge: HOME | End: 2023-05-02
Attending: SURGERY | Admitting: SURGERY
Payer: COMMERCIAL

## 2023-05-02 ENCOUNTER — OFFICE VISIT (OUTPATIENT)
Dept: SURGERY | Facility: CLINIC | Age: 38
End: 2023-05-02
Payer: COMMERCIAL

## 2023-05-02 DIAGNOSIS — R10.31 RLQ ABDOMINAL PAIN: ICD-10-CM

## 2023-05-02 DIAGNOSIS — Z09 SURGERY FOLLOW-UP EXAMINATION: Primary | ICD-10-CM

## 2023-05-02 PROCEDURE — 250N000011 HC RX IP 250 OP 636: Performed by: SURGERY

## 2023-05-02 PROCEDURE — 74177 CT ABD & PELVIS W/CONTRAST: CPT

## 2023-05-02 PROCEDURE — 99024 POSTOP FOLLOW-UP VISIT: CPT | Performed by: SURGERY

## 2023-05-02 PROCEDURE — 250N000009 HC RX 250: Performed by: SURGERY

## 2023-05-02 RX ORDER — IOPAMIDOL 755 MG/ML
500 INJECTION, SOLUTION INTRAVASCULAR ONCE
Status: COMPLETED | OUTPATIENT
Start: 2023-05-02 | End: 2023-05-02

## 2023-05-02 RX ADMIN — IOPAMIDOL 67 ML: 755 INJECTION, SOLUTION INTRAVENOUS at 17:19

## 2023-05-02 RX ADMIN — SODIUM CHLORIDE 59 ML: 9 INJECTION, SOLUTION INTRAVENOUS at 17:19

## 2023-05-03 ENCOUNTER — TELEPHONE (OUTPATIENT)
Dept: SURGERY | Facility: CLINIC | Age: 38
End: 2023-05-03
Payer: COMMERCIAL

## 2023-05-03 NOTE — PROGRESS NOTES
Surgery Note    Allyson reports she is doing ok overall. She is still having some lower abdominal pain, more on the right and is worried about this.     O: There were no vitals taken for this visit.  Abd: wound vac removed. Wound is 1.5cm x 0.5cm x 1cm. Healthy granulation tissue at base.     A/P: 37yof with intra abdominal and wound infection after  s/p laparoscopic washout and vac placement. Wound does not require further vac. Wound packed with 1in packing gauze. I will order a CT abd/pelvis today given her ongoing pain. I will see her next week for follow up.     Autumn Arteaga MD  [facility]  [phone]

## 2023-05-03 NOTE — CONFIDENTIAL NOTE
I called Allyson with her CT results. Things look good. Start miralax for constipation.     Autumn Arteaga MD  [facility]  [phone]

## 2023-05-04 ENCOUNTER — TRANSFERRED RECORDS (OUTPATIENT)
Dept: MULTI SPECIALTY CLINIC | Facility: CLINIC | Age: 38
End: 2023-05-04

## 2023-05-04 LAB — PAP SMEAR - HIM PATIENT REPORTED: NORMAL

## 2023-05-08 ENCOUNTER — TELEPHONE (OUTPATIENT)
Dept: SURGERY | Facility: CLINIC | Age: 38
End: 2023-05-08
Payer: COMMERCIAL

## 2023-05-08 NOTE — TELEPHONE ENCOUNTER
Name of caller: james Mary Grace with [employer] wound vac supplier.    Reason for Call:  Need measurements of the wound for insurance purposed.     Looking for 4/16-22 and 4/30-5/6 measurements .    Mary Grace can be reached at:  [phone]  Extension: [phone]  Wound Vac Rental Order #: [id number]      Surgeon:  Dr. Arteaga    Recent Surgery:  Yes.    If yes, when & what type:  4/8/2023    Diagnostic laparoscopy, laparoscopic lavage with abdominal drain placement x2  wound vac placement

## 2023-05-08 NOTE — TELEPHONE ENCOUNTER
Called [employer] and provided requested information regarding wound measurements    Informed [employer] that wound vac has been discontinued    Ame Lehman RN-BSN

## 2023-05-09 ENCOUNTER — OFFICE VISIT (OUTPATIENT)
Dept: SURGERY | Facility: CLINIC | Age: 38
End: 2023-05-09
Payer: COMMERCIAL

## 2023-05-09 DIAGNOSIS — Z09 SURGERY FOLLOW-UP EXAMINATION: Primary | ICD-10-CM

## 2023-05-09 PROCEDURE — 99024 POSTOP FOLLOW-UP VISIT: CPT | Performed by: SURGERY

## 2023-05-09 NOTE — PROGRESS NOTES
Surgery Note    Allyson returns for postop visit to evaluate her abdominal wound.  She reports she is doing well and changing her dressing daily.  She is having trouble getting packing in the wound.  Reports no new symptoms.    O: There were no vitals taken for this visit.  Abd: Soft, nontender the  wound is now 1 cm x 0.5 cm and no longer requires packing.  Bacitracin placed over the open area and covered with a Band-Aid.    A/P: Allyson is a 37-year-old female who had significant infection following her  a few weeks back who underwent a laparoscopic abdominal washout with drain placement after IR drainage was insufficient and we also placed a wound VAC on the  incision.  This wound is now healing very well and does not require packing going forward.  I recommend she place bacitracin over this until it is healed over and then start massaging the tissue to help soften the fibrosis.  She will follow-up with me as needed going forward.    Autumn Arteaga MD  [facility]  [phone]

## 2023-05-09 NOTE — LETTER
May 9, 2023          Kamila Roland MD      RE:   Aicha Draper 1985      Dear Colleague,    Thank you for referring your patient, Aicha Draper, to [facility] at {[facility] - SITE LOCATIONS:[id number]}. Please see a copy of my visit note below.    Allyson returns for postop visit to evaluate her abdominal wound.  She reports she is doing well and changing her dressing daily.  She is having trouble getting packing in the wound.  Reports no new symptoms.     O: There were no vitals taken for this visit.  Abd: Soft, nontender the  wound is now 1 cm x 0.5 cm and no longer requires packing.  Bacitracin placed over the open area and covered with a Band-Aid.     A/P: Allyson is a 37-year-old female who had significant infection following her  a few weeks back who underwent a laparoscopic abdominal washout with drain placement after IR drainage was insufficient and we also placed a wound VAC on the  incision.  This wound is now healing very well and does not require packing going forward.  I recommend she place bacitracin over this until it is healed over and then start massaging the tissue to help soften the fibrosis.  She will follow-up with me as needed going forward.    Again, thank you for allowing me to participate in the care of your patient.      Sincerely,      Autumn Arteaga MD

## 2023-05-14 ENCOUNTER — ANESTHESIA (OUTPATIENT)
Dept: SURGERY | Facility: CLINIC | Age: 38
End: 2023-05-14
Payer: COMMERCIAL

## 2023-05-14 ENCOUNTER — APPOINTMENT (OUTPATIENT)
Dept: CT IMAGING | Facility: CLINIC | Age: 38
End: 2023-05-14
Attending: EMERGENCY MEDICINE
Payer: COMMERCIAL

## 2023-05-14 ENCOUNTER — ANESTHESIA EVENT (OUTPATIENT)
Dept: SURGERY | Facility: CLINIC | Age: 38
End: 2023-05-14
Payer: COMMERCIAL

## 2023-05-14 ENCOUNTER — TELEPHONE (OUTPATIENT)
Dept: SURGERY | Facility: CLINIC | Age: 38
End: 2023-05-14
Payer: COMMERCIAL

## 2023-05-14 ENCOUNTER — HOSPITAL ENCOUNTER (INPATIENT)
Facility: CLINIC | Age: 38
LOS: 6 days | Discharge: HOME | End: 2023-05-20
Attending: EMERGENCY MEDICINE | Admitting: SURGERY
Payer: COMMERCIAL

## 2023-05-14 DIAGNOSIS — K56.609 SMALL BOWEL OBSTRUCTION (H): Primary | ICD-10-CM

## 2023-05-14 LAB
ALBUMIN SERPL BCG-MCNC: 4.6 G/DL (ref 3.5–5.2)
ALP SERPL-CCNC: 120 U/L (ref 35–104)
ALT SERPL W P-5'-P-CCNC: 18 U/L (ref 10–35)
ANION GAP SERPL CALCULATED.3IONS-SCNC: 17 MMOL/L (ref 7–15)
AST SERPL W P-5'-P-CCNC: 26 U/L (ref 10–35)
BASOPHILS # BLD AUTO: 0.1 10E3/UL (ref 0–0.2)
BASOPHILS NFR BLD AUTO: 1 %
BILIRUB DIRECT SERPL-MCNC: <0.2 MG/DL (ref 0–0.3)
BILIRUB SERPL-MCNC: 0.5 MG/DL
BUN SERPL-MCNC: 10 MG/DL (ref 6–20)
CALCIUM SERPL-MCNC: 10.7 MG/DL (ref 8.6–10)
CHLORIDE SERPL-SCNC: 98 MMOL/L (ref 98–107)
CREAT SERPL-MCNC: 0.72 MG/DL (ref 0.51–0.95)
DEPRECATED HCO3 PLAS-SCNC: 22 MMOL/L (ref 22–29)
EOSINOPHIL # BLD AUTO: 0.1 10E3/UL (ref 0–0.7)
EOSINOPHIL NFR BLD AUTO: 1 %
ERYTHROCYTE [DISTWIDTH] IN BLOOD BY AUTOMATED COUNT: 13.2 % (ref 10–15)
GFR SERPL CREATININE-BSD FRML MDRD: >90 ML/MIN/1.73M2
GLUCOSE BLDC GLUCOMTR-MCNC: 125 MG/DL (ref 70–99)
GLUCOSE SERPL-MCNC: 102 MG/DL (ref 70–99)
HCG SERPL QL: NEGATIVE
HCT VFR BLD AUTO: 49.7 % (ref 35–47)
HGB BLD-MCNC: 16.7 G/DL (ref 11.7–15.7)
IMM GRANULOCYTES # BLD: 0.1 10E3/UL
IMM GRANULOCYTES NFR BLD: 0 %
LIPASE SERPL-CCNC: 32 U/L (ref 13–60)
LYMPHOCYTES # BLD AUTO: 2.2 10E3/UL (ref 0.8–5.3)
LYMPHOCYTES NFR BLD AUTO: 17 %
MCH RBC QN AUTO: 30.5 PG (ref 26.5–33)
MCHC RBC AUTO-ENTMCNC: 33.6 G/DL (ref 31.5–36.5)
MCV RBC AUTO: 91 FL (ref 78–100)
MONOCYTES # BLD AUTO: 0.4 10E3/UL (ref 0–1.3)
MONOCYTES NFR BLD AUTO: 3 %
NEUTROPHILS # BLD AUTO: 9.8 10E3/UL (ref 1.6–8.3)
NEUTROPHILS NFR BLD AUTO: 78 %
NRBC # BLD AUTO: 0 10E3/UL
NRBC BLD AUTO-RTO: 0 /100
PLATELET # BLD AUTO: 383 10E3/UL (ref 150–450)
POTASSIUM SERPL-SCNC: 4.3 MMOL/L (ref 3.4–5.3)
PROT SERPL-MCNC: 7.8 G/DL (ref 6.4–8.3)
RBC # BLD AUTO: 5.47 10E6/UL (ref 3.8–5.2)
SODIUM SERPL-SCNC: 137 MMOL/L (ref 136–145)
WBC # BLD AUTO: 12.6 10E3/UL (ref 4–11)

## 2023-05-14 PROCEDURE — 84703 CHORIONIC GONADOTROPIN ASSAY: CPT | Performed by: EMERGENCY MEDICINE

## 2023-05-14 PROCEDURE — 272N000001 HC OR GENERAL SUPPLY STERILE: Performed by: SURGERY

## 2023-05-14 PROCEDURE — 250N000009 HC RX 250: Performed by: EMERGENCY MEDICINE

## 2023-05-14 PROCEDURE — 710N000009 HC RECOVERY PHASE 1, LEVEL 1, PER MIN: Performed by: SURGERY

## 2023-05-14 PROCEDURE — 250N000009 HC RX 250: Performed by: SURGERY

## 2023-05-14 PROCEDURE — 36415 COLL VENOUS BLD VENIPUNCTURE: CPT | Performed by: EMERGENCY MEDICINE

## 2023-05-14 PROCEDURE — 250N000011 HC RX IP 250 OP 636: Performed by: EMERGENCY MEDICINE

## 2023-05-14 PROCEDURE — 250N000009 HC RX 250: Performed by: ANESTHESIOLOGY

## 2023-05-14 PROCEDURE — 74177 CT ABD & PELVIS W/CONTRAST: CPT

## 2023-05-14 PROCEDURE — 250N000011 HC RX IP 250 OP 636: Performed by: ANESTHESIOLOGY

## 2023-05-14 PROCEDURE — 82947 ASSAY GLUCOSE BLOOD QUANT: CPT | Performed by: EMERGENCY MEDICINE

## 2023-05-14 PROCEDURE — 250N000025 HC SEVOFLURANE, PER MIN: Performed by: SURGERY

## 2023-05-14 PROCEDURE — 80053 COMPREHEN METABOLIC PANEL: CPT | Performed by: EMERGENCY MEDICINE

## 2023-05-14 PROCEDURE — 370N000017 HC ANESTHESIA TECHNICAL FEE, PER MIN: Performed by: SURGERY

## 2023-05-14 PROCEDURE — C1765 ADHESION BARRIER: HCPCS | Performed by: SURGERY

## 2023-05-14 PROCEDURE — 99222 1ST HOSP IP/OBS MODERATE 55: CPT | Mod: 57 | Performed by: SURGERY

## 2023-05-14 PROCEDURE — 999N000141 HC STATISTIC PRE-PROCEDURE NURSING ASSESSMENT: Performed by: SURGERY

## 2023-05-14 PROCEDURE — 258N000003 HC RX IP 258 OP 636: Performed by: EMERGENCY MEDICINE

## 2023-05-14 PROCEDURE — 0DN80ZZ RELEASE SMALL INTESTINE, OPEN APPROACH: ICD-10-PCS | Performed by: SURGERY

## 2023-05-14 PROCEDURE — 96361 HYDRATE IV INFUSION ADD-ON: CPT

## 2023-05-14 PROCEDURE — 83690 ASSAY OF LIPASE: CPT | Performed by: EMERGENCY MEDICINE

## 2023-05-14 PROCEDURE — 120N000001 HC R&B MED SURG/OB

## 2023-05-14 PROCEDURE — 99285 EMERGENCY DEPT VISIT HI MDM: CPT | Mod: 25

## 2023-05-14 PROCEDURE — 258N000003 HC RX IP 258 OP 636: Performed by: STUDENT IN AN ORGANIZED HEALTH CARE EDUCATION/TRAINING PROGRAM

## 2023-05-14 PROCEDURE — 96374 THER/PROPH/DIAG INJ IV PUSH: CPT

## 2023-05-14 PROCEDURE — 44005 FREEING OF BOWEL ADHESION: CPT | Performed by: SURGERY

## 2023-05-14 PROCEDURE — 250N000009 HC RX 250: Performed by: NURSE ANESTHETIST, CERTIFIED REGISTERED

## 2023-05-14 PROCEDURE — 258N000003 HC RX IP 258 OP 636: Performed by: NURSE ANESTHETIST, CERTIFIED REGISTERED

## 2023-05-14 PROCEDURE — 250N000011 HC RX IP 250 OP 636: Performed by: NURSE ANESTHETIST, CERTIFIED REGISTERED

## 2023-05-14 PROCEDURE — 360N000077 HC SURGERY LEVEL 4, PER MIN: Performed by: SURGERY

## 2023-05-14 PROCEDURE — 250N000011 HC RX IP 250 OP 636: Performed by: STUDENT IN AN ORGANIZED HEALTH CARE EDUCATION/TRAINING PROGRAM

## 2023-05-14 PROCEDURE — 96375 TX/PRO/DX INJ NEW DRUG ADDON: CPT

## 2023-05-14 PROCEDURE — 82248 BILIRUBIN DIRECT: CPT | Performed by: EMERGENCY MEDICINE

## 2023-05-14 PROCEDURE — 85025 COMPLETE CBC W/AUTO DIFF WBC: CPT | Performed by: EMERGENCY MEDICINE

## 2023-05-14 RX ORDER — ONDANSETRON 2 MG/ML
4 INJECTION INTRAMUSCULAR; INTRAVENOUS EVERY 30 MIN PRN
Status: DISCONTINUED | OUTPATIENT
Start: 2023-05-14 | End: 2023-05-14 | Stop reason: HOSPADM

## 2023-05-14 RX ORDER — NALOXONE HYDROCHLORIDE 0.4 MG/ML
0.2 INJECTION, SOLUTION INTRAMUSCULAR; INTRAVENOUS; SUBCUTANEOUS
Status: DISCONTINUED | OUTPATIENT
Start: 2023-05-14 | End: 2023-05-20 | Stop reason: HOSPADM

## 2023-05-14 RX ORDER — PROPOFOL 10 MG/ML
INJECTION, EMULSION INTRAVENOUS PRN
Status: DISCONTINUED | OUTPATIENT
Start: 2023-05-14 | End: 2023-05-14

## 2023-05-14 RX ORDER — MAGNESIUM HYDROXIDE 1200 MG/15ML
LIQUID ORAL PRN
Status: DISCONTINUED | OUTPATIENT
Start: 2023-05-14 | End: 2023-05-14 | Stop reason: HOSPADM

## 2023-05-14 RX ORDER — CEFAZOLIN SODIUM 1 G/3ML
INJECTION, POWDER, FOR SOLUTION INTRAMUSCULAR; INTRAVENOUS PRN
Status: DISCONTINUED | OUTPATIENT
Start: 2023-05-14 | End: 2023-05-14

## 2023-05-14 RX ORDER — ONDANSETRON 4 MG/1
4 TABLET, ORALLY DISINTEGRATING ORAL EVERY 6 HOURS PRN
Status: DISCONTINUED | OUTPATIENT
Start: 2023-05-14 | End: 2023-05-20 | Stop reason: HOSPADM

## 2023-05-14 RX ORDER — HYDROMORPHONE HCL IN WATER/PF 6 MG/30 ML
0.2 PATIENT CONTROLLED ANALGESIA SYRINGE INTRAVENOUS EVERY 5 MIN PRN
Status: DISCONTINUED | OUTPATIENT
Start: 2023-05-14 | End: 2023-05-14 | Stop reason: HOSPADM

## 2023-05-14 RX ORDER — SODIUM CHLORIDE 9 MG/ML
INJECTION, SOLUTION INTRAVENOUS ONCE
Status: COMPLETED | OUTPATIENT
Start: 2023-05-14 | End: 2023-05-14

## 2023-05-14 RX ORDER — PROPOFOL 10 MG/ML
INJECTION, EMULSION INTRAVENOUS CONTINUOUS PRN
Status: DISCONTINUED | OUTPATIENT
Start: 2023-05-14 | End: 2023-05-14

## 2023-05-14 RX ORDER — DEXAMETHASONE SODIUM PHOSPHATE 4 MG/ML
INJECTION, SOLUTION INTRA-ARTICULAR; INTRALESIONAL; INTRAMUSCULAR; INTRAVENOUS; SOFT TISSUE PRN
Status: DISCONTINUED | OUTPATIENT
Start: 2023-05-14 | End: 2023-05-14

## 2023-05-14 RX ORDER — FENTANYL CITRATE 50 UG/ML
INJECTION, SOLUTION INTRAMUSCULAR; INTRAVENOUS PRN
Status: DISCONTINUED | OUTPATIENT
Start: 2023-05-14 | End: 2023-05-14

## 2023-05-14 RX ORDER — ONDANSETRON 2 MG/ML
INJECTION INTRAMUSCULAR; INTRAVENOUS PRN
Status: DISCONTINUED | OUTPATIENT
Start: 2023-05-14 | End: 2023-05-14

## 2023-05-14 RX ORDER — FENTANYL CITRATE 50 UG/ML
50 INJECTION, SOLUTION INTRAMUSCULAR; INTRAVENOUS ONCE
Status: COMPLETED | OUTPATIENT
Start: 2023-05-14 | End: 2023-05-14

## 2023-05-14 RX ORDER — ONDANSETRON 4 MG/1
4 TABLET, ORALLY DISINTEGRATING ORAL EVERY 30 MIN PRN
Status: DISCONTINUED | OUTPATIENT
Start: 2023-05-14 | End: 2023-05-14 | Stop reason: HOSPADM

## 2023-05-14 RX ORDER — HYDROMORPHONE HCL IN WATER/PF 6 MG/30 ML
0.4 PATIENT CONTROLLED ANALGESIA SYRINGE INTRAVENOUS EVERY 5 MIN PRN
Status: DISCONTINUED | OUTPATIENT
Start: 2023-05-14 | End: 2023-05-14 | Stop reason: HOSPADM

## 2023-05-14 RX ORDER — PIPERACILLIN SODIUM, TAZOBACTAM SODIUM 4; .5 G/20ML; G/20ML
4.5 INJECTION, POWDER, LYOPHILIZED, FOR SOLUTION INTRAVENOUS ONCE
Status: COMPLETED | OUTPATIENT
Start: 2023-05-14 | End: 2023-05-14

## 2023-05-14 RX ORDER — ONDANSETRON 2 MG/ML
4 INJECTION INTRAMUSCULAR; INTRAVENOUS EVERY 6 HOURS PRN
Status: DISCONTINUED | OUTPATIENT
Start: 2023-05-14 | End: 2023-05-20 | Stop reason: HOSPADM

## 2023-05-14 RX ORDER — NALOXONE HYDROCHLORIDE 0.4 MG/ML
0.4 INJECTION, SOLUTION INTRAMUSCULAR; INTRAVENOUS; SUBCUTANEOUS
Status: DISCONTINUED | OUTPATIENT
Start: 2023-05-14 | End: 2023-05-20 | Stop reason: HOSPADM

## 2023-05-14 RX ORDER — KETOROLAC TROMETHAMINE 15 MG/ML
15 INJECTION, SOLUTION INTRAMUSCULAR; INTRAVENOUS EVERY 6 HOURS PRN
Status: DISCONTINUED | OUTPATIENT
Start: 2023-05-14 | End: 2023-05-15

## 2023-05-14 RX ORDER — IOPAMIDOL 755 MG/ML
125 INJECTION, SOLUTION INTRAVASCULAR ONCE
Status: COMPLETED | OUTPATIENT
Start: 2023-05-14 | End: 2023-05-14

## 2023-05-14 RX ORDER — DEXTROSE MONOHYDRATE, SODIUM CHLORIDE, AND POTASSIUM CHLORIDE 50; 1.49; 4.5 G/1000ML; G/1000ML; G/1000ML
INJECTION, SOLUTION INTRAVENOUS CONTINUOUS
Status: DISCONTINUED | OUTPATIENT
Start: 2023-05-14 | End: 2023-05-20

## 2023-05-14 RX ORDER — LIDOCAINE HYDROCHLORIDE 20 MG/ML
INJECTION, SOLUTION INFILTRATION; PERINEURAL PRN
Status: DISCONTINUED | OUTPATIENT
Start: 2023-05-14 | End: 2023-05-14

## 2023-05-14 RX ORDER — ONDANSETRON 2 MG/ML
4 INJECTION INTRAMUSCULAR; INTRAVENOUS ONCE
Status: COMPLETED | OUTPATIENT
Start: 2023-05-14 | End: 2023-05-14

## 2023-05-14 RX ORDER — SODIUM CHLORIDE, SODIUM LACTATE, POTASSIUM CHLORIDE, CALCIUM CHLORIDE 600; 310; 30; 20 MG/100ML; MG/100ML; MG/100ML; MG/100ML
INJECTION, SOLUTION INTRAVENOUS CONTINUOUS PRN
Status: DISCONTINUED | OUTPATIENT
Start: 2023-05-14 | End: 2023-05-14

## 2023-05-14 RX ORDER — HYDROMORPHONE HYDROCHLORIDE 1 MG/ML
INJECTION, SOLUTION INTRAMUSCULAR; INTRAVENOUS; SUBCUTANEOUS PRN
Status: DISCONTINUED | OUTPATIENT
Start: 2023-05-14 | End: 2023-05-14

## 2023-05-14 RX ORDER — HYDROMORPHONE HYDROCHLORIDE 1 MG/ML
.3-.6 INJECTION, SOLUTION INTRAMUSCULAR; INTRAVENOUS; SUBCUTANEOUS
Status: DISCONTINUED | OUTPATIENT
Start: 2023-05-14 | End: 2023-05-20 | Stop reason: HOSPADM

## 2023-05-14 RX ORDER — SCOLOPAMINE TRANSDERMAL SYSTEM 1 MG/1
1 PATCH, EXTENDED RELEASE TRANSDERMAL ONCE
Status: COMPLETED | OUTPATIENT
Start: 2023-05-14 | End: 2023-05-15

## 2023-05-14 RX ORDER — FENTANYL CITRATE 0.05 MG/ML
25 INJECTION, SOLUTION INTRAMUSCULAR; INTRAVENOUS EVERY 5 MIN PRN
Status: DISCONTINUED | OUTPATIENT
Start: 2023-05-14 | End: 2023-05-14 | Stop reason: HOSPADM

## 2023-05-14 RX ORDER — FENTANYL CITRATE 0.05 MG/ML
50 INJECTION, SOLUTION INTRAMUSCULAR; INTRAVENOUS EVERY 5 MIN PRN
Status: DISCONTINUED | OUTPATIENT
Start: 2023-05-14 | End: 2023-05-14 | Stop reason: HOSPADM

## 2023-05-14 RX ORDER — MORPHINE SULFATE 4 MG/ML
4 INJECTION, SOLUTION INTRAMUSCULAR; INTRAVENOUS ONCE
Status: COMPLETED | OUTPATIENT
Start: 2023-05-14 | End: 2023-05-14

## 2023-05-14 RX ORDER — LIDOCAINE 40 MG/G
CREAM TOPICAL
Status: DISCONTINUED | OUTPATIENT
Start: 2023-05-14 | End: 2023-05-20 | Stop reason: HOSPADM

## 2023-05-14 RX ORDER — SODIUM CHLORIDE, SODIUM LACTATE, POTASSIUM CHLORIDE, CALCIUM CHLORIDE 600; 310; 30; 20 MG/100ML; MG/100ML; MG/100ML; MG/100ML
INJECTION, SOLUTION INTRAVENOUS CONTINUOUS
Status: DISCONTINUED | OUTPATIENT
Start: 2023-05-14 | End: 2023-05-14 | Stop reason: HOSPADM

## 2023-05-14 RX ADMIN — SODIUM CHLORIDE 60 ML: 9 INJECTION, SOLUTION INTRAVENOUS at 16:30

## 2023-05-14 RX ADMIN — ONDANSETRON 4 MG: 2 INJECTION INTRAMUSCULAR; INTRAVENOUS at 18:56

## 2023-05-14 RX ADMIN — HYDROMORPHONE HYDROCHLORIDE 0.2 MG: 0.2 INJECTION, SOLUTION INTRAMUSCULAR; INTRAVENOUS; SUBCUTANEOUS at 21:50

## 2023-05-14 RX ADMIN — SODIUM CHLORIDE 1000 ML: 9 INJECTION, SOLUTION INTRAVENOUS at 16:13

## 2023-05-14 RX ADMIN — SODIUM CHLORIDE, POTASSIUM CHLORIDE, SODIUM LACTATE AND CALCIUM CHLORIDE: 600; 310; 30; 20 INJECTION, SOLUTION INTRAVENOUS at 19:26

## 2023-05-14 RX ADMIN — LIDOCAINE HYDROCHLORIDE 100 MG: 20 INJECTION, SOLUTION INFILTRATION; PERINEURAL at 18:53

## 2023-05-14 RX ADMIN — ONDANSETRON 4 MG: 2 INJECTION INTRAMUSCULAR; INTRAVENOUS at 16:14

## 2023-05-14 RX ADMIN — MIDAZOLAM 2 MG: 1 INJECTION INTRAMUSCULAR; INTRAVENOUS at 18:48

## 2023-05-14 RX ADMIN — FENTANYL CITRATE 50 MCG: 50 INJECTION, SOLUTION INTRAMUSCULAR; INTRAVENOUS at 18:53

## 2023-05-14 RX ADMIN — ROCURONIUM BROMIDE 5 MG: 50 INJECTION, SOLUTION INTRAVENOUS at 18:53

## 2023-05-14 RX ADMIN — MORPHINE SULFATE 4 MG: 4 INJECTION, SOLUTION INTRAMUSCULAR; INTRAVENOUS at 16:14

## 2023-05-14 RX ADMIN — FENTANYL CITRATE 50 MCG: 50 INJECTION, SOLUTION INTRAMUSCULAR; INTRAVENOUS at 21:17

## 2023-05-14 RX ADMIN — SODIUM CHLORIDE: 9 INJECTION, SOLUTION INTRAVENOUS at 17:50

## 2023-05-14 RX ADMIN — Medication 50 ML: at 20:40

## 2023-05-14 RX ADMIN — CEFAZOLIN 2 G: 1 INJECTION, POWDER, FOR SOLUTION INTRAMUSCULAR; INTRAVENOUS at 18:48

## 2023-05-14 RX ADMIN — FENTANYL CITRATE 50 MCG: 50 INJECTION, SOLUTION INTRAMUSCULAR; INTRAVENOUS at 21:03

## 2023-05-14 RX ADMIN — ROCURONIUM BROMIDE 10 MG: 50 INJECTION, SOLUTION INTRAVENOUS at 19:15

## 2023-05-14 RX ADMIN — HYDROMORPHONE HYDROCHLORIDE 0.4 MG: 1 INJECTION, SOLUTION INTRAMUSCULAR; INTRAVENOUS; SUBCUTANEOUS at 22:43

## 2023-05-14 RX ADMIN — POTASSIUM CHLORIDE, DEXTROSE MONOHYDRATE AND SODIUM CHLORIDE: 150; 5; 450 INJECTION, SOLUTION INTRAVENOUS at 23:03

## 2023-05-14 RX ADMIN — ROCURONIUM BROMIDE 25 MG: 50 INJECTION, SOLUTION INTRAVENOUS at 19:04

## 2023-05-14 RX ADMIN — PROPOFOL 30 MCG/KG/MIN: 10 INJECTION, EMULSION INTRAVENOUS at 18:53

## 2023-05-14 RX ADMIN — PIPERACILLIN AND TAZOBACTAM 4.5 G: 4; .5 INJECTION, POWDER, FOR SOLUTION INTRAVENOUS at 17:50

## 2023-05-14 RX ADMIN — FENTANYL CITRATE 50 MCG: 50 INJECTION, SOLUTION INTRAMUSCULAR; INTRAVENOUS at 19:13

## 2023-05-14 RX ADMIN — HYDROMORPHONE HYDROCHLORIDE 0.4 MG: 0.2 INJECTION, SOLUTION INTRAMUSCULAR; INTRAVENOUS; SUBCUTANEOUS at 21:28

## 2023-05-14 RX ADMIN — DEXAMETHASONE SODIUM PHOSPHATE 4 MG: 4 INJECTION, SOLUTION INTRA-ARTICULAR; INTRALESIONAL; INTRAMUSCULAR; INTRAVENOUS; SOFT TISSUE at 18:56

## 2023-05-14 RX ADMIN — SUGAMMADEX 200 MG: 100 INJECTION, SOLUTION INTRAVENOUS at 20:27

## 2023-05-14 RX ADMIN — SCOPALAMINE 1 PATCH: 1 PATCH, EXTENDED RELEASE TRANSDERMAL at 18:45

## 2023-05-14 RX ADMIN — HYDROMORPHONE HYDROCHLORIDE 0.5 MG: 1 INJECTION, SOLUTION INTRAMUSCULAR; INTRAVENOUS; SUBCUTANEOUS at 19:16

## 2023-05-14 RX ADMIN — PROPOFOL 200 MG: 10 INJECTION, EMULSION INTRAVENOUS at 18:53

## 2023-05-14 RX ADMIN — IOPAMIDOL 65 ML: 755 INJECTION, SOLUTION INTRAVENOUS at 16:27

## 2023-05-14 RX ADMIN — SODIUM CHLORIDE, POTASSIUM CHLORIDE, SODIUM LACTATE AND CALCIUM CHLORIDE: 600; 310; 30; 20 INJECTION, SOLUTION INTRAVENOUS at 18:48

## 2023-05-14 RX ADMIN — SUCCINYLCHOLINE CHLORIDE 110 MG: 20 INJECTION, SOLUTION INTRAMUSCULAR; INTRAVENOUS; PARENTERAL at 18:53

## 2023-05-14 ASSESSMENT — ENCOUNTER SYMPTOMS
DIARRHEA: 0
COUGH: 0
ABDOMINAL PAIN: 1
VOMITING: 1
NAUSEA: 1
FEVER: 0

## 2023-05-14 ASSESSMENT — ACTIVITIES OF DAILY LIVING (ADL)
ADLS_ACUITY_SCORE: 35

## 2023-05-14 NOTE — H&P
[facility]  History and Physical   General Surgery  Brandon Bhat MD, MD       Aicha Draper MRN# [id number]   YOB: 1985 Age: 37 year old      Date of Admission:  2023         Assessment and Plan:   Pleasant 37-year-old female presents with a 1 day history of severe left-sided abdominal pain.  Very complicated recent surgical history.  Patient presented to the emergency department where she was found to have a leukocytosis.  CT scan was ordered and this showed an area of possible closed-loop small bowel obstruction in the left abdomen.  There was significant small bowel edema as well as mesenteric edema.  Given these findings, I have recommended emergent laparotomy.  I think this would be the most definitive way to interrogate the bowel and to rule out associated pathology.  Patient understands that there is a risk for small bowel resection.  I will place an NG tube at the time of surgery.           Code Status:   Full Code         Primary Care Physician:   Kamila Roland [phone]         Chief Complaint:   Abdominal pain    History is obtained from the patient         History of Present Illness:   Aicha Draper is a 37 year old female who presents with abdominal pain since earlier today.  Developed intra-abdominal abscess after  in early March.  Developed intra-abdominal abscesses which were drained percutaneously.  Then underwent exploratory laparoscopy for additional abdominal washout in early May.  Had been doing well and had a CT scan approximately 1 week ago that did not show any residual fluid.  Began having worsening abdominal pain earlier today and called me with concerns.  I advised that she go into the emergency department.  She was found to have a leukocytosis and CT scan suggested a high-grade bowel obstruction, possibly closed-loop.  There was edematous bowel and mesenteric edema.             Past Medical History:   Aicha Draper   has a past medical history of Acute congestive heart failure, unspecified heart failure type (H) (2023), Atypical squamous cells of undetermined significance (ASCUS) on Papanicolaou smear of cervix (2015), In vitro fertilization, PCOS (polycystic ovarian syndrome), and PONV (postoperative nausea and vomiting).             Past Surgical History:   Aicha Draper  has a past surgical history that includes surgical history of -  (); APPENDECTOMY (); cholecystectomy, laporoscopic (2010);  section (N/A, 3/5/2023); IR Abscess Tube Change (4/3/2023); and Laparoscopy diagnostic (general) (N/A, 2023).         Home Medications:     Prior to Admission medications    Medication Sig Last Dose Taking? Auth Provider Long Term End Date   acetaminophen (TYLENOL) 500 MG tablet Take 2 tablets (1,000 mg) by mouth every 6 hours as needed for mild pain  Yes Manasa Wild MD No    calcium carbonate (TUMS) 500 MG chewable tablet Take 2 chew tab by mouth 4 times daily as needed for heartburn  Yes Reported, Patient     fish oil-omega-3 fatty acids 1000 MG capsule Take 2 g by mouth every evening  Yes Unknown, Entered By History     ibuprofen (ADVIL/MOTRIN) 200 MG tablet Take 2 tablets (400 mg) by mouth every 6 hours as needed for pain  Yes Manasa Wild MD No    omeprazole (PRILOSEC) 20 MG DR capsule Take 20 mg by mouth every evening  Yes Unknown, Entered By History     Prenatal Vit-Fe Fumarate-FA (PRENATAL MULTIVITAMIN W/IRON) 27-0.8 MG tablet Take 1 tablet by mouth daily  Yes Unknown, Entered By History     senna-docusate (SENOKOT-S/PERICOLACE) 8.6-50 MG tablet Take 2 tablets by mouth daily   Manasa Wild MD              Allergies:     Allergies   Allergen Reactions     No Known Drug Allergy             Social History:   Aicha Draper  reports that she has never smoked. She has never used smokeless tobacco. She reports that she does not currently use alcohol. She reports that she does not use  "drugs.            Family History:   Aicha Draper family history includes Arthritis in her father, paternal grandfather, and paternal grandmother; Breast Cancer in an other family member; C.A.D. in her maternal grandfather, maternal grandmother, and paternal grandfather; Diabetes in her paternal grandmother; Hyperlipidemia in her mother; Hypertension in her mother; Prostate Cancer in her maternal grandfather.           Review of Systems:   The 10 point Review of Systems is negative other than noted in the HPI.           Physical Exam:   Blood pressure 134/84, pulse 104, temperature 97.2  F (36.2  C), temperature source Temporal, resp. rate 18, height 1.626 m (5' 4\"), weight 59 kg (130 lb), SpO2 100 %, not currently breastfeeding.  130 lbs 0 oz    Thin pleasant female in no distress.  Patient has a pleasant affect and communicates well.   Pupils equal round and reactive to light.   No cervical lymphadenopathy or thyromegaly.   Lung fields clear, breathing comfortably.   Heart normal sinus rhythm.  No murmurs rubs or gallops.  Abdomen soft, mildly distended.  No open surgical wounds.  Well-healed laparoscopy scars.  Diffuse tenderness throughout the left abdomen, no peritoneal signs or rebound.  Skin warm, dry.  No obvious rashes or lesions.           Data:   All new lab and imaging data was reviewed.  CT scan shows a sizable segment of small bowel which was thickened and dilated.  Recent Labs   Lab Test 05/14/23  1539 04/11/23  1842   WBC 12.6* 6.7   HGB 16.7* 8.6*   MCV 91 94    654*      Recent Labs   Lab Test 05/14/23  1539 04/09/23  0705    137   POTASSIUM 4.3 4.2   CHLORIDE 98 102   CO2 22 25   BUN 10.0 7.6   CR 0.72 0.71   ANIONGAP 17* 10   DEMETRICE 10.7* 9.2   * 95                      "

## 2023-05-14 NOTE — ED NOTES
"[facility]  ED Nurse Handoff Report    ED Chief complaint: Abdominal Pain      ED Diagnosis:   Final diagnoses:   Small bowel obstruction (H)       Code Status: not addressed at this time    Allergies:   Allergies   Allergen Reactions    No Known Drug Allergy        Patient Story: Patient coming in for RUQ abdominal pain. States pain started yesterday evening. Endorses nausea, denies fever, diarrhea. States she has had her gal bladder and appendix removed. Has had a recent abdominal infection about 5 weeks ago.   Focused Assessment:  patient denies SOB. A/Ox4, c/o abd pain that started today and has been getting increasingly worse over the last day.     Treatments and/or interventions provided: IV labs, morphine, zofran and CT scan.   Patient's response to treatments and/or interventions: decrease in pain     To be done/followed up on inpatient unit:  continue to monitor    Does this patient have any cognitive concerns?:  A/Ox4    Activity level - Baseline/Home:  Independent  Activity Level - Current:   Independent    Patient's Preferred language: English   Needed?: No    Isolation: None  Infection: Not Applicable  Patient tested for COVID 19 prior to admission: NO  Bariatric?: No    Vital Signs:   Vitals:    05/14/23 1532   BP: 134/84   Pulse: 104   Resp: 18   Temp: 97.2  F (36.2  C)   TempSrc: Temporal   SpO2: 100%   Weight: 59 kg (130 lb)   Height: 1.626 m (5' 4\")       Cardiac Rhythm:     Was the PSS-3 completed:   Yes  What interventions are required if any?               Family Comments:  at bedside  OBS brochure/video discussed/provided to patient/family: N/A              Name of person given brochure if not patient: NA              Relationship to patient: NA    For the majority of the shift this patient's behavior was Green.   Behavioral interventions performed were None.    ED NURSE PHONE NUMBER: [phone]         "

## 2023-05-14 NOTE — ED PROVIDER NOTES
History     Chief Complaint:  Abdominal Pain       HPI   Aicha Draper is a 37 year old female with a history of a postoperative infection after a  3 weeks ago.  She underwent laparoscopic abdominal wall washout after an IR drain placement and drainage were insufficient.  She had wound VAC.  She was in the hospital from  to  and discharged on 1 week of Augmentin. Today, she complains of suprapubic abdominal pain that began around 0100 this morning. The pain is not around her incisional site and is not similar to the pain she felt with this. She describes this pain as a 8/10 waxing and waning sharp, cramping pain. Movement will worsen the pain, and she has not been able to find alleviating factors. She has been taking Pepto Bismol and Tums with no relief. She ate a fish sandwich for dinner around 1930 last night, and has not eaten anything today. She had one episode of vomiting on the way to the ED today. She denies abnormalities in her bowel or urinary movements. She denies fever, cough, or diarrhea.      Independent Historian:   None - Patient Only    Review of External Notes:   23 Dr. Arteaga discharge note. Lap abdominal washout with drain placement and wound vac placement.    ROS:  Review of Systems   Constitutional: Negative for fever.   Respiratory: Negative for cough.    Gastrointestinal: Positive for abdominal pain, nausea and vomiting. Negative for diarrhea.   Genitourinary: Negative.    All other systems reviewed and are negative.      Allergies:  No Known Drug Allergies      Medications:    ferrous sulfate      Past Medical History:    Atypical squamous cells of undetermined significance ASCUS  PCOS  Migraine  Anemia due to blood loss     Past Surgical History:    C section  Cholecystectomy  Remove hairball from stomach  Appendectomy      Family History:    Father-Arthritis  Mother-HTN, hyperlipidemia    Social History:   reports that she has never smoked. She has never used  "smokeless tobacco. She reports that she does not currently use alcohol. She reports that she does not use drugs.  PCP: Kamila Roland     Physical Exam     Patient Vitals for the past 24 hrs:   BP Temp Temp src Pulse Resp SpO2 Height Weight   05/14/23 1532 134/84 97.2  F (36.2  C) Temporal 104 18 100 % 1.626 m (5' 4\") 59 kg (130 lb)        Physical Exam    Physical Exam   Constitutional:  Patient is oriented to person, place, and time. They appear well-developed and well-nourished. Mild distress secondary to abdominal pain.   HENT:   Mouth/Throat:   Oropharynx is clear and moist.   Eyes:    Conjunctivae normal and EOM are normal. Pupils are equal, round, and reactive to light.   Neck:    Normal range of motion.   Cardiovascular: Normal rate, regular rhythm and normal heart sounds.  Exam reveals no gallop and no friction rub.  No murmur heard.  Pulmonary/Chest:  Effort normal and breath sounds normal. Patient has no wheezes. Patient has no rales.   Abdominal:   Soft. Bowel sounds are diminished. Patient exhibits no mass. There is epigastric pain to palpation. There is no rebound and no guarding.   Musculoskeletal:  Normal range of motion. Patient exhibits no edema.   Neurological:   Patient is alert and oriented to person, place, and time. Patient has normal strength. No cranial nerve deficit or sensory deficit. GCS 15  Skin:   Incision site looks clean, dry, intact, with no erythema.   Psychiatric:   Patient has a normal mood and affect. Patient's behavior is normal. Judgment and thought content normal.       Emergency Department Course     Imaging:  CT Abdomen Pelvis w Contrast   Final Result   IMPRESSION:    1.  Development of clustered dilated small bowel loops in the left hemiabdomen with focal transition point consistent with high-grade bowel obstruction.   2.  Just proximal to the transition point, short segment of small bowel demonstrates mural thickening with relative hypoenhancement can be seen with " ischemic enteritis. No pneumatosis intestinalis or pneumoperitoneum to suggest transmural ischemia.   3.  Mesenteric congestion and trace ascites.         Report per radiology    Laboratory:  Labs Ordered and Resulted from Time of ED Arrival to Time of ED Departure   COMPREHENSIVE METABOLIC PANEL - Abnormal       Result Value    Sodium 137      Potassium 4.3      Chloride 98      Carbon Dioxide (CO2) 22      Anion Gap 17 (*)     Urea Nitrogen 10.0      Creatinine 0.72      Calcium 10.7 (*)     Glucose 102 (*)     Alkaline Phosphatase 120 (*)     AST 26      ALT 18      Protein Total 7.8      Albumin 4.6      Bilirubin Total 0.5      GFR Estimate >90     CBC WITH PLATELETS AND DIFFERENTIAL - Abnormal    WBC Count 12.6 (*)     RBC Count 5.47 (*)     Hemoglobin 16.7 (*)     Hematocrit 49.7 (*)     MCV 91      MCH 30.5      MCHC 33.6      RDW 13.2      Platelet Count 383      % Neutrophils 78      % Lymphocytes 17      % Monocytes 3      % Eosinophils 1      % Basophils 1      % Immature Granulocytes 0      NRBCs per 100 WBC 0      Absolute Neutrophils 9.8 (*)     Absolute Lymphocytes 2.2      Absolute Monocytes 0.4      Absolute Eosinophils 0.1      Absolute Basophils 0.1      Absolute Immature Granulocytes 0.1      Absolute NRBCs 0.0     LIPASE - Normal    Lipase 32     HCG QUALITATIVE PREGNANCY - Normal    hCG Serum Qualitative Negative     BILIRUBIN DIRECT - Normal    Bilirubin Direct <0.20          Emergency Department Course & Assessments:    Interventions:  Medications   piperacillin-tazobactam (ZOSYN) 4.5 g vial to attach to  mL bag (has no administration in time range)   morphine (PF) injection 4 mg (4 mg Intravenous $Given 5/14/23 1614)   ondansetron (ZOFRAN) injection 4 mg (4 mg Intravenous $Given 5/14/23 1614)   0.9% sodium chloride BOLUS (1,000 mLs Intravenous $New Bag 5/14/23 1613)   iopamidol (ISOVUE-370) solution 125 mL (65 mLs Intravenous $Given 5/14/23 1627)   sodium chloride 0.9 % bag 500mL for  CT scan flush use (60 mLs Intravenous $Given 5/14/23 1630)        Assessments:  1552 I obtained history and examined the patient, as noted above.    Independent Interpretation (X-rays, CTs, rhythm strip):  None    Consultations/Discussion of Management or Tests:  1644 I spoke with Dr. Bhat from general surgery regarding the patient's presentation, findings here in the ED, and plan of care.   1720 I spoke with Dr. Bhat again.    Social Determinants of Health affecting care:   None    Disposition:  The patient was transferred to the OR under the care of Dr. Bhat.    Impression & Plan      Medical Decision Making:  Allyson Draper is a 37 year old presenting to the emergency department with pain across the upper abdomen in the setting of post op infection after a c section. She had significant amount of pain on presentation. Blood work and imaging was performed. She has a WBC that is elevated. Her creatinine is normal. Her lipase is normal. CT of the abdomen is concerning for ischemic bowel. I did discuss the case with Dr. Bhat, general surgery, and he was able to review the images. At this time she was given antibiotics and will be going to the OR for Ex lap and take down of adhesions.      Diagnosis:    ICD-10-CM    1. Small bowel obstruction (H)  K56.609 Case Request: LAPAROTOMY     Case Request: LAPAROTOMY           Scribe Disclosure:  Tim MONTES, am serving as a scribe at 3:52 PM on 5/14/2023 to document services personally performed by Dayana Velazquez MD based on my observations and the provider's statements to me.        Dayana Velazquez MD  05/15/23 1103

## 2023-05-14 NOTE — ED TRIAGE NOTES
Patient coming in for RUQ abdominal pain. States pain started yesterday evening. Endorses nausea, denies fever, diarrhea. States she has had her gal bladder and appendix removed. Has had a recent abdominal infection about 5 weeks ago.      Triage Assessment     Row Name 05/14/23 4172       Triage Assessment (Adult)    Airway WDL WDL       Respiratory WDL    Respiratory WDL WDL       Skin Circulation/Temperature WDL    Skin Circulation/Temperature WDL WDL       Cardiac WDL    Cardiac WDL WDL       Peripheral/Neurovascular WDL    Peripheral Neurovascular WDL WDL       Cognitive/Neuro/Behavioral WDL    Cognitive/Neuro/Behavioral WDL WDL

## 2023-05-14 NOTE — TELEPHONE ENCOUNTER
Patient called regarding new onset severe abdominal pain and nausea.  No fevers or chills.  No drainage from her previous drain sites or incision.  Feels more bloated.  Has a very complicated medical and surgical history primarily consisting of intra-abdominal abscesses requiring surgical and percutaneous drainage.  CT scan performed approximately 1 week ago did not reveal any residual fluid pockets.  Despite this, I think her history warrants further work-up.  I have recommended that she present to the emergency department where I expect CT scan will be ordered.  I suspect this could represent an early bowel obstruction, as her previous abdominal pathology would make her high risk for this.  If this is the case, this would warrant admission and probable NG tube placement.  I would be very hard pressed to proceed with any surgical intervention.

## 2023-05-14 NOTE — PHARMACY-ADMISSION MEDICATION HISTORY
Pharmacist Admission Medication History    Admission medication history is complete. The information provided in this note is only as accurate as the sources available at the time of the update.    Medication reconciliation/reorder completed by provider prior to medication history? No    Information Source(s): Patient via in-person    Pertinent Information:     Changes made to PTA medication list:    Added: None    Deleted: None    Changed: None    Medication Affordability:       Allergies reviewed with patient and updates made in EHR: no    Medication History Completed By: Ruthie Selby RPH 5/14/2023 5:47 PM    Prior to Admission medications    Medication Sig Last Dose Taking? Auth Provider Long Term End Date   acetaminophen (TYLENOL) 500 MG tablet Take 2 tablets (1,000 mg) by mouth every 6 hours as needed for mild pain  Yes Manasa Wild MD No    calcium carbonate (TUMS) 500 MG chewable tablet Take 2 chew tab by mouth 4 times daily as needed for heartburn  Yes Reported, Patient     fish oil-omega-3 fatty acids 1000 MG capsule Take 2 g by mouth every evening  Yes Unknown, Entered By History     ibuprofen (ADVIL/MOTRIN) 200 MG tablet Take 2 tablets (400 mg) by mouth every 6 hours as needed for pain  Yes Manasa Wild MD No    omeprazole (PRILOSEC) 20 MG DR capsule Take 20 mg by mouth every evening  Yes Unknown, Entered By History     Prenatal Vit-Fe Fumarate-FA (PRENATAL MULTIVITAMIN W/IRON) 27-0.8 MG tablet Take 1 tablet by mouth daily  Yes Unknown, Entered By History     senna-docusate (SENOKOT-S/PERICOLACE) 8.6-50 MG tablet Take 2 tablets by mouth daily   Manasa Wild MD Marci Jacobson PharmD

## 2023-05-14 NOTE — PROGRESS NOTES
RECEIVING UNIT ED HANDOFF REVIEW    ED Nurse Handoff Report was reviewed by: Mirta Wilkins RN on May 14, 2023 at 5:50 PM

## 2023-05-14 NOTE — LETTER
[facility] GENERAL SURGERY  [address] [place] [address]  Phone: [phone]  Fax: [phone]    May 20, 2023        Aicha Draper  [address] [address]  [facility] [address]          To whom it may concern:    RE: Aicha Draper    Aicha is under my care for her recent surgery and hospitalization. Due to her multiple surgeries, hospitalizations, and need for extended recovery, she will be unable to return to work for at least 2 more months. We anticipate that she will be able to return to work on Tuesday, September 1st, with no restrictions. We will be seeing her in our office before that time.     Please contact me at our main office number (below) for questions or concerns.       Sincerely,    Ling Castrejon PA-C with Autumn Arteaga MD  [facility]  [phone]

## 2023-05-15 LAB
ALBUMIN SERPL BCG-MCNC: 3.3 G/DL (ref 3.5–5.2)
ALP SERPL-CCNC: 78 U/L (ref 35–104)
ALT SERPL W P-5'-P-CCNC: 13 U/L (ref 10–35)
ANION GAP SERPL CALCULATED.3IONS-SCNC: 11 MMOL/L (ref 7–15)
AST SERPL W P-5'-P-CCNC: 14 U/L (ref 10–35)
BILIRUB SERPL-MCNC: 0.5 MG/DL
BUN SERPL-MCNC: 9.5 MG/DL (ref 6–20)
CALCIUM SERPL-MCNC: 8.8 MG/DL (ref 8.6–10)
CHLORIDE SERPL-SCNC: 107 MMOL/L (ref 98–107)
CREAT SERPL-MCNC: 0.69 MG/DL (ref 0.51–0.95)
DEPRECATED HCO3 PLAS-SCNC: 21 MMOL/L (ref 22–29)
GFR SERPL CREATININE-BSD FRML MDRD: >90 ML/MIN/1.73M2
GLUCOSE BLDC GLUCOMTR-MCNC: 102 MG/DL (ref 70–99)
GLUCOSE SERPL-MCNC: 102 MG/DL (ref 70–99)
HOLD SPECIMEN: NORMAL
POTASSIUM SERPL-SCNC: 4.4 MMOL/L (ref 3.4–5.3)
PROT SERPL-MCNC: 5.4 G/DL (ref 6.4–8.3)
SODIUM SERPL-SCNC: 139 MMOL/L (ref 136–145)

## 2023-05-15 PROCEDURE — 258N000003 HC RX IP 258 OP 636: Performed by: STUDENT IN AN ORGANIZED HEALTH CARE EDUCATION/TRAINING PROGRAM

## 2023-05-15 PROCEDURE — 250N000013 HC RX MED GY IP 250 OP 250 PS 637: Performed by: SURGERY

## 2023-05-15 PROCEDURE — 120N000001 HC R&B MED SURG/OB

## 2023-05-15 PROCEDURE — 80053 COMPREHEN METABOLIC PANEL: CPT | Performed by: STUDENT IN AN ORGANIZED HEALTH CARE EDUCATION/TRAINING PROGRAM

## 2023-05-15 PROCEDURE — 250N000011 HC RX IP 250 OP 636: Performed by: SURGERY

## 2023-05-15 PROCEDURE — 36415 COLL VENOUS BLD VENIPUNCTURE: CPT | Performed by: STUDENT IN AN ORGANIZED HEALTH CARE EDUCATION/TRAINING PROGRAM

## 2023-05-15 PROCEDURE — 250N000011 HC RX IP 250 OP 636: Performed by: STUDENT IN AN ORGANIZED HEALTH CARE EDUCATION/TRAINING PROGRAM

## 2023-05-15 RX ORDER — ENOXAPARIN SODIUM 100 MG/ML
40 INJECTION SUBCUTANEOUS EVERY 24 HOURS
Status: DISCONTINUED | OUTPATIENT
Start: 2023-05-16 | End: 2023-05-20 | Stop reason: HOSPADM

## 2023-05-15 RX ORDER — ACETAMINOPHEN 325 MG/1
650 TABLET ORAL EVERY 6 HOURS
Status: DISCONTINUED | OUTPATIENT
Start: 2023-05-15 | End: 2023-05-20 | Stop reason: HOSPADM

## 2023-05-15 RX ORDER — DIAZEPAM 10 MG/2ML
5 INJECTION, SOLUTION INTRAMUSCULAR; INTRAVENOUS EVERY 4 HOURS PRN
Status: DISCONTINUED | OUTPATIENT
Start: 2023-05-15 | End: 2023-05-20 | Stop reason: HOSPADM

## 2023-05-15 RX ORDER — KETOROLAC TROMETHAMINE 15 MG/ML
15 INJECTION, SOLUTION INTRAMUSCULAR; INTRAVENOUS EVERY 6 HOURS
Status: COMPLETED | OUTPATIENT
Start: 2023-05-15 | End: 2023-05-16

## 2023-05-15 RX ADMIN — Medication 1 LOZENGE: at 07:57

## 2023-05-15 RX ADMIN — KETOROLAC TROMETHAMINE 15 MG: 15 INJECTION, SOLUTION INTRAMUSCULAR; INTRAVENOUS at 09:24

## 2023-05-15 RX ADMIN — HYDROMORPHONE HYDROCHLORIDE 0.6 MG: 1 INJECTION, SOLUTION INTRAMUSCULAR; INTRAVENOUS; SUBCUTANEOUS at 00:51

## 2023-05-15 RX ADMIN — KETOROLAC TROMETHAMINE 15 MG: 15 INJECTION, SOLUTION INTRAMUSCULAR; INTRAVENOUS at 21:18

## 2023-05-15 RX ADMIN — Medication 1 ML: at 07:57

## 2023-05-15 RX ADMIN — HYDROMORPHONE HYDROCHLORIDE 0.5 MG: 1 INJECTION, SOLUTION INTRAMUSCULAR; INTRAVENOUS; SUBCUTANEOUS at 18:02

## 2023-05-15 RX ADMIN — Medication 1 LOZENGE: at 15:40

## 2023-05-15 RX ADMIN — ACETAMINOPHEN 650 MG: 325 TABLET ORAL at 12:07

## 2023-05-15 RX ADMIN — HYDROMORPHONE HYDROCHLORIDE 0.5 MG: 1 INJECTION, SOLUTION INTRAMUSCULAR; INTRAVENOUS; SUBCUTANEOUS at 21:18

## 2023-05-15 RX ADMIN — HYDROMORPHONE HYDROCHLORIDE 0.6 MG: 1 INJECTION, SOLUTION INTRAMUSCULAR; INTRAVENOUS; SUBCUTANEOUS at 13:58

## 2023-05-15 RX ADMIN — Medication 1 LOZENGE: at 12:07

## 2023-05-15 RX ADMIN — KETOROLAC TROMETHAMINE 15 MG: 15 INJECTION, SOLUTION INTRAMUSCULAR; INTRAVENOUS at 02:53

## 2023-05-15 RX ADMIN — Medication 1 LOZENGE: at 18:02

## 2023-05-15 RX ADMIN — POTASSIUM CHLORIDE, DEXTROSE MONOHYDRATE AND SODIUM CHLORIDE: 150; 5; 450 INJECTION, SOLUTION INTRAVENOUS at 20:28

## 2023-05-15 RX ADMIN — HYDROMORPHONE HYDROCHLORIDE 0.6 MG: 1 INJECTION, SOLUTION INTRAMUSCULAR; INTRAVENOUS; SUBCUTANEOUS at 09:24

## 2023-05-15 RX ADMIN — Medication 1 LOZENGE: at 13:59

## 2023-05-15 RX ADMIN — Medication 1 LOZENGE: at 20:28

## 2023-05-15 RX ADMIN — SODIUM CHLORIDE 20 MG: 9 INJECTION, SOLUTION INTRAVENOUS at 07:23

## 2023-05-15 RX ADMIN — HYDROMORPHONE HYDROCHLORIDE 0.6 MG: 1 INJECTION, SOLUTION INTRAMUSCULAR; INTRAVENOUS; SUBCUTANEOUS at 04:26

## 2023-05-15 RX ADMIN — Medication 1 LOZENGE: at 09:49

## 2023-05-15 RX ADMIN — KETOROLAC TROMETHAMINE 15 MG: 15 INJECTION, SOLUTION INTRAMUSCULAR; INTRAVENOUS at 15:40

## 2023-05-15 RX ADMIN — ACETAMINOPHEN 650 MG: 325 TABLET ORAL at 23:35

## 2023-05-15 RX ADMIN — POTASSIUM CHLORIDE, DEXTROSE MONOHYDRATE AND SODIUM CHLORIDE: 150; 5; 450 INJECTION, SOLUTION INTRAVENOUS at 10:30

## 2023-05-15 RX ADMIN — Medication 1 LOZENGE: at 23:24

## 2023-05-15 ASSESSMENT — ACTIVITIES OF DAILY LIVING (ADL)
ADLS_ACUITY_SCORE: 37

## 2023-05-15 NOTE — ANESTHESIA CARE TRANSFER NOTE
Patient: Aicha Draper    Procedure: Procedure(s):  EXPLORATORY LAPAROTOMY; LYSIS OF ADHESIONS       Diagnosis: Small bowel obstruction (H) [K56.609]  Diagnosis Additional Information: No value filed.    Anesthesia Type:   General     Note:    Oropharynx: oropharynx clear of all foreign objects  Level of Consciousness: drowsy  Oxygen Supplementation: face mask    Independent Airway: airway patency satisfactory and stable  Dentition: dentition unchanged  Vital Signs Stable: post-procedure vital signs reviewed and stable  Report to RN Given: handoff report given  Patient transferred to: PACU    Handoff Report: Identifed the Patient, Identified the Reponsible Provider, Reviewed the pertinent medical history, Discussed the surgical course, Reviewed Intra-OP anesthesia mangement and issues during anesthesia, Set expectations for post-procedure period and Allowed opportunity for questions and acknowledgement of understanding      Vitals:  Vitals Value Taken Time   BP     Temp     Pulse 94 05/14/23 2055   Resp 26 05/14/23 2055   SpO2 100 % 05/14/23 2055   Vitals shown include unvalidated device data.    Electronically Signed By: ALLYSON Kaur CRNA  May 14, 2023  8:56 PM

## 2023-05-15 NOTE — ANESTHESIA PROCEDURE NOTES
Airway       Patient location during procedure: OR       Procedure Start/Stop Times: 5/14/2023 6:55 PM  Staff -        Anesthesiologist:  Ross Becker MD       CRNA: Cris Mendoza APRN CRNA       Performed By: CRNAIndications and Patient Condition       Indications for airway management: yenny-procedural       Induction type:RSI       Mask difficulty assessment: 0 - not attempted    Final Airway Details       Final airway type: endotracheal airway       Successful airway: ETT - single  Endotracheal Airway Details        ETT size (mm): 7.0       Cuffed: yes       Successful intubation technique: direct laryngoscopy       DL Blade Type: MAC 3       Grade View of Cords: 1       Adjucts: stylet       Position: Right       Measured from: lips       Secured at (cm): 22       Bite block used: None    Post intubation assessment        Placement verified by: capnometry, equal breath sounds and chest rise        Number of attempts at approach: 1       Number of other approaches attempted: 0       Secured with: pink tape       Ease of procedure: easy       Dentition: Intact and Unchanged    Medication(s) Administered   Medication Administration Time: 5/14/2023 6:55 PM

## 2023-05-15 NOTE — PROGRESS NOTES
"General Surgery Progress Note    Subjective: Allyson reports pain on her abdomen, generally well controlled. No flatus or bm yet. Tolerating NG ok, 100ml out    Objective: /77 (BP Location: Right arm)   Pulse 71   Temp 97.8  F (36.6  C) (Axillary)   Resp 17   Ht 1.626 m (5' 4\")   Wt 59 kg (130 lb)   SpO2 100%   Breastfeeding No   BMI 22.31 kg/m    Gen: alert, no distress  CV: RRR  Pulm: nonlabored breathing  Abd: soft, minimal shadowing on lower bandage  Ext: WWP    Assessment/Plan:   Aicha Draper  is a 37 year old female s/p ex lap with REKHA for closed loop SBO. Stable/improving.   - remove linton  - ok to clamp NGT for meds and ambulation, have to LIS in bed  - pain control  - start lovenox dvt ppx tomorrow  - am labs    Autumn Arteaga MD  [facility]  [phone]              "

## 2023-05-15 NOTE — ANESTHESIA POSTPROCEDURE EVALUATION
Patient: Aicha Draper    Procedure: Procedure(s):  EXPLORATORY LAPAROTOMY; LYSIS OF ADHESIONS       Anesthesia Type:  General    Note:  Disposition: Inpatient   Postop Pain Control: Uneventful            Sign Out: Well controlled pain   PONV: No   Neuro/Psych: Uneventful            Sign Out: Acceptable/Baseline neuro status   Airway/Respiratory: Uneventful            Sign Out: Acceptable/Baseline resp. status   CV/Hemodynamics: Uneventful            Sign Out: Acceptable CV status; No obvious hypovolemia; No obvious fluid overload   Other NRE: NONE   DID A NON-ROUTINE EVENT OCCUR? No           Last vitals:  Vitals Value Taken Time   /92 05/14/23 2150   Temp 36.3  C (97.3  F) 05/14/23 2150   Pulse 88 05/14/23 2200   Resp 13 05/14/23 2200   SpO2 98 % 05/14/23 2200       Electronically Signed By: Ross Becker MD  May 15, 2023  1:16 AM

## 2023-05-15 NOTE — PLAN OF CARE
POD 0. A&Ox4. CMS intat. Bowel sounds hypoactive, - flatus, tolerating NPO diet. VSS. Dressing CDI to midline, and mini transverse. PIV infusing. Due to dangle. C/o abdominal pain, decreased with dilaudid. Pt scoring green on the Aggression Stop Light Tool. Plan : continue to monitor. Discharge pending improvement.

## 2023-05-15 NOTE — ANESTHESIA PROCEDURE NOTES
TAP Procedure Note    Pre-Procedure   Staff -        Anesthesiologist:  Ross Becker MD       Performed By: Anesthesiologist       Location: pre-op       Pre-Anesthestic Checklist: patient identified, IV checked, site marked, risks and benefits discussed, informed consent, monitors and equipment checked, pre-op evaluation, at physician/surgeon's request and post-op pain management  Timeout:       Correct Patient: Yes        Correct Procedure: Yes        Correct Site: Yes        Correct Position: Yes        Correct Laterality: Yes        Site Marked: Yes  Procedure Documentation  Procedure: TAP       Laterality: bilateral       Patient Position: sitting       Patient Prep/Sterile Barriers: sterile gloves, mask       Skin prep: Chloraprep       Local skin infiltrated with 1 mL of 1% lidocaine.        Needle Type: insulated       Needle Gauge: 21.        Needle Length (millimeters): 90        Ultrasound guided       1. Ultrasound was used to identify targeted nerve, plexus, vascular marker, or fascial plane and place a needle adjacent to it in real-time.       2. Ultrasound was used to visualize the spread of anesthetic in close proximity to the above referenced structure.       3. A permanent image is entered into the patient's record.       4. The visualized anatomic structures appeared normal.       5. There were no apparent abnormal pathologic findings.    Assessment/Narrative         The placement was negative for: blood aspirated, painful injection and site bleeding       Paresthesias: No.       Bolus given via needle. no blood aspirated via catheter.        Secured via.        Insertion/Infusion Method: Single Shot       Complications: none       Injection made incrementally with aspirations every 5 mL.    Medication(s) Administered   Bupivacaine 0.25% w/ 1:400K Epi (Injection) - Injection   50 mL - 5/14/2023 8:40:00 PM   Comments:  Bolus via needle, 50 ml of 0.25% bupivacaine with 1:400,000 epinephrine  "(25ml each side) for bilateral TAP blocks.    Ultrasound Interpretation, Peripheral Nerve Block    1. Under ultrasound guidance, the needle was inserted and placed in close proximity to the target nerve(s).  2. Ultrasound was also used to visualize the spread of the anesthetic in close proximity to the nerve(s) being blocked.  Local anesthetic was administered in incremental doses, with intermittent negative aspiration.    3. The nerve(s) appeared anatomically normal.  4. There were no apparent abnormal pathological findings.  5. A permanent ultrasound image was saved in the patient's record.    Patient tolerated well, was mildly sedated but communicative throughout the procedure.    No complications.      The surgeon has given a verbal order transferring care of this patient to me for the performance of a regional analgesia block for post-op pain control. It is requested of me because I am uniquely trained and qualified to perform this block and the surgeon is neither trained nor qualified to perform this procedure.    Ross Becker MD   9:04 PM        FOR [facility] ([facility]) ONLY:   Pain Team Contact information: please page the Pain Team Via [employer]. Search \"Pain\". During daytime hours, please page the attending first. At night please page the resident first.      "

## 2023-05-15 NOTE — PLAN OF CARE
Summary: EXPLORATORY LAPAROTOMY; LYSIS OF ADHESIONS, SBO  Date & Time: 05/15/23 6516-6912  Orientation: AOx4  POD# 1  Surgeon: Dr. Bhat  Activity Level: IND  Fall Risk: N/A  Behavior & Aggression: Green  Pain Management: PRN Dilaudid, Scheduled Toradol & Tylenol  ABNL VS/O2: VSS on RA  ABNL Lab/BG: WNL  Diet: NPO  Bowel/Bladder: Up to bathroom, No BM  Drains/Devices: NG to LIS, PIV infusing d5% 0.45% +20KCL  Tests/Procedures: N/A  Anticipated  DC Date: Pending return of bowl function    Other Important Info: Pt os Aox4, VSS on RA. NPO with ice chips. 800 out of NG, but pt reported eating a lot of ice chips. Estimated 600cc ice chips in. Output green. Pt encouraged to walk in halls. Dilaudid x2 for pain. Throat spray and lozenge for throat pain. Plan to discharge when return of bowl function.

## 2023-05-15 NOTE — OP NOTE
General Surgery Operative Note    PREOPERATIVE DIAGNOSIS:  Small bowel obstruction (H) [K56.609]    POSTOPERATIVE DIAGNOSIS: Adhesive closed-loop small bowel obstruction    PROCEDURE:   Procedure(s):  EXPLORATORY LAPAROTOMY; LYSIS OF ADHESIONS    ANESTHESIA:  General.    PREOPERATIVE MEDICATIONS: Zosyn    SURGEON:  Brandon Bhat MD    ASSISTANT: Waqas Glover MD.  Assistant was required owing to challenging exposure and need for retraction.    INDICATIONS: Patient has a complicated recent surgical history including multiple intra-abdominal abscesses after .  She presented with acute abdominal pain and a CT scan suggested a long segment of jejunum which appeared to be congested with mesenteric edema and concern for closed-loop bowel obstruction    PROCEDURE:  The patient was taken to the operating suite and uneventfully endotracheally intubated.  The abdomen was prepped and draped in a sterile fashion.  Surgeon initiated timeout was acknowledged.  We made an upper midline incision and took this down through skin and subcutaneous tissue.  Fascia was divided along the midline.  We entered the abdomen and began taking filmy omental adhesions down from the anterior abdominal wall.  [place] wound retractor was placed.  We began to elevate the transverse colon and there was one area where it was densely tethered to the anterior abdominal wall.  The area that was  from the abdominal wall was oversewn with interrupted 3-0 Vicryl sutures.  We then began eviscerating the small bowel.  We found some decompressed small bowel in ran this proximally to the ligament of Treitz.  As we did this we lysed innumerable filmy interloop adhesions.  We then began running this distally and eventually encountered the affected segment of distended small bowel.  This was very thickened and dilated but did not appear to be in any way ischemic or threatened.  We continued eviscerating the small bowel until we found the point of  some tethering.  We then set about finding the cecum.  Once this was encountered we found the terminal ileum and began running the decompressed bowel proximally.  We continued this until we eventually encountered the previously identified dilated bowel and felt that the bowel had been run twice from top to bottom and then back to the top.  Abdomen was copiously irrigated with 3 L of warm saline.  We then once again eviscerated the bowel and replaced it in its usual anatomic configuration.  Small bowel was covered with the transverse colon and omentum.  We ensured that the NG tube was in place within the body of the stomach.  A large sheet of Seprafilm was placed over the omentum between the abdominal wall.  Fascia was closed with a pair of running looped 0 Maxon sutures.  Deep tissues were reapproximated with interrupted 3-0 Vicryl.  The skin edges were reapproximated with 4-0 Vicryl and Steri-Strips.  The patient was uneventfully extubated, awakened and taken to the PACU in stable condition.  At the conclusion of the case, all lap and needle counts were correct.      ESTIMATED BLOOD LOSS: 15 mL    INTRAOPERATIVE FINDINGS: Closed-loop bowel obstruction with no evidence of ischemia.  Extensive intra-abdominal adhesiolysis performed.    Brandon Bhat MD, MD

## 2023-05-15 NOTE — PROGRESS NOTES
Today is POD #1 of exploratory laparotomy with lysis of adhesions, mid-abdominal incision CDI. Patient has NG tube in, output greenish but gradually clearing. Bowel sounds hypoactive, denies passing flatus overnight. No nausea overnight, incisional pain treated with 0.6 mg IV dilaudid and 15 mg Toradol. Patient not out of bed this night, calling appropriately. Wants to get rid of Daniels but no orders available. On capnography overnight, 3L oxygen with saturation at 100%.

## 2023-05-16 LAB
ANION GAP SERPL CALCULATED.3IONS-SCNC: 9 MMOL/L (ref 7–15)
BUN SERPL-MCNC: 4.9 MG/DL (ref 6–20)
CALCIUM SERPL-MCNC: 8.7 MG/DL (ref 8.6–10)
CHLORIDE SERPL-SCNC: 108 MMOL/L (ref 98–107)
CREAT SERPL-MCNC: 0.67 MG/DL (ref 0.51–0.95)
DEPRECATED HCO3 PLAS-SCNC: 23 MMOL/L (ref 22–29)
ERYTHROCYTE [DISTWIDTH] IN BLOOD BY AUTOMATED COUNT: 13.4 % (ref 10–15)
GFR SERPL CREATININE-BSD FRML MDRD: >90 ML/MIN/1.73M2
GLUCOSE BLDC GLUCOMTR-MCNC: 101 MG/DL (ref 70–99)
GLUCOSE BLDC GLUCOMTR-MCNC: 97 MG/DL (ref 70–99)
GLUCOSE SERPL-MCNC: 97 MG/DL (ref 70–99)
GLUCOSE SERPL-MCNC: 97 MG/DL (ref 70–99)
HCT VFR BLD AUTO: 33.6 % (ref 35–47)
HGB BLD-MCNC: 10.9 G/DL (ref 11.7–15.7)
MCH RBC QN AUTO: 30.9 PG (ref 26.5–33)
MCHC RBC AUTO-ENTMCNC: 32.4 G/DL (ref 31.5–36.5)
MCV RBC AUTO: 95 FL (ref 78–100)
PLATELET # BLD AUTO: 241 10E3/UL (ref 150–450)
POTASSIUM SERPL-SCNC: 4.1 MMOL/L (ref 3.4–5.3)
RBC # BLD AUTO: 3.53 10E6/UL (ref 3.8–5.2)
SODIUM SERPL-SCNC: 140 MMOL/L (ref 136–145)
WBC # BLD AUTO: 6.6 10E3/UL (ref 4–11)

## 2023-05-16 PROCEDURE — C9113 INJ PANTOPRAZOLE SODIUM, VIA: HCPCS | Performed by: STUDENT IN AN ORGANIZED HEALTH CARE EDUCATION/TRAINING PROGRAM

## 2023-05-16 PROCEDURE — 250N000013 HC RX MED GY IP 250 OP 250 PS 637: Performed by: SURGERY

## 2023-05-16 PROCEDURE — 250N000011 HC RX IP 250 OP 636: Performed by: STUDENT IN AN ORGANIZED HEALTH CARE EDUCATION/TRAINING PROGRAM

## 2023-05-16 PROCEDURE — 258N000003 HC RX IP 258 OP 636: Performed by: STUDENT IN AN ORGANIZED HEALTH CARE EDUCATION/TRAINING PROGRAM

## 2023-05-16 PROCEDURE — 36415 COLL VENOUS BLD VENIPUNCTURE: CPT | Performed by: SURGERY

## 2023-05-16 PROCEDURE — 250N000009 HC RX 250

## 2023-05-16 PROCEDURE — 85014 HEMATOCRIT: CPT | Performed by: SURGERY

## 2023-05-16 PROCEDURE — 82310 ASSAY OF CALCIUM: CPT | Performed by: SURGERY

## 2023-05-16 PROCEDURE — 250N000011 HC RX IP 250 OP 636: Performed by: SURGERY

## 2023-05-16 PROCEDURE — 120N000001 HC R&B MED SURG/OB

## 2023-05-16 RX ORDER — BISACODYL 10 MG
10 SUPPOSITORY, RECTAL RECTAL ONCE
Status: COMPLETED | OUTPATIENT
Start: 2023-05-16 | End: 2023-05-16

## 2023-05-16 RX ADMIN — HYDROMORPHONE HYDROCHLORIDE 0.5 MG: 1 INJECTION, SOLUTION INTRAMUSCULAR; INTRAVENOUS; SUBCUTANEOUS at 14:24

## 2023-05-16 RX ADMIN — Medication 1 LOZENGE: at 04:41

## 2023-05-16 RX ADMIN — HYDROMORPHONE HYDROCHLORIDE 0.5 MG: 1 INJECTION, SOLUTION INTRAMUSCULAR; INTRAVENOUS; SUBCUTANEOUS at 07:39

## 2023-05-16 RX ADMIN — HYDROMORPHONE HYDROCHLORIDE 0.5 MG: 1 INJECTION, SOLUTION INTRAMUSCULAR; INTRAVENOUS; SUBCUTANEOUS at 00:26

## 2023-05-16 RX ADMIN — POTASSIUM CHLORIDE, DEXTROSE MONOHYDRATE AND SODIUM CHLORIDE: 150; 5; 450 INJECTION, SOLUTION INTRAVENOUS at 14:28

## 2023-05-16 RX ADMIN — Medication 1 LOZENGE: at 22:12

## 2023-05-16 RX ADMIN — ACETAMINOPHEN 650 MG: 325 TABLET ORAL at 06:14

## 2023-05-16 RX ADMIN — ENOXAPARIN SODIUM 40 MG: 40 INJECTION SUBCUTANEOUS at 06:14

## 2023-05-16 RX ADMIN — POTASSIUM CHLORIDE, DEXTROSE MONOHYDRATE AND SODIUM CHLORIDE: 150; 5; 450 INJECTION, SOLUTION INTRAVENOUS at 06:13

## 2023-05-16 RX ADMIN — PANTOPRAZOLE SODIUM 20 MG: 40 INJECTION, POWDER, FOR SOLUTION INTRAVENOUS at 06:14

## 2023-05-16 RX ADMIN — KETOROLAC TROMETHAMINE 15 MG: 15 INJECTION, SOLUTION INTRAMUSCULAR; INTRAVENOUS at 09:10

## 2023-05-16 RX ADMIN — KETOROLAC TROMETHAMINE 15 MG: 15 INJECTION, SOLUTION INTRAMUSCULAR; INTRAVENOUS at 04:41

## 2023-05-16 RX ADMIN — BISACODYL 10 MG: 10 SUPPOSITORY RECTAL at 08:54

## 2023-05-16 RX ADMIN — Medication 1 LOZENGE: at 19:58

## 2023-05-16 RX ADMIN — Medication 1 LOZENGE: at 00:25

## 2023-05-16 RX ADMIN — TOPICAL ANESTHETIC 1 ML: 200 SPRAY DENTAL; PERIODONTAL at 10:39

## 2023-05-16 RX ADMIN — HYDROMORPHONE HYDROCHLORIDE 0.5 MG: 1 INJECTION, SOLUTION INTRAMUSCULAR; INTRAVENOUS; SUBCUTANEOUS at 18:16

## 2023-05-16 RX ADMIN — ACETAMINOPHEN 650 MG: 325 TABLET ORAL at 12:32

## 2023-05-16 RX ADMIN — Medication 1 LOZENGE: at 06:27

## 2023-05-16 RX ADMIN — HYDROMORPHONE HYDROCHLORIDE 0.5 MG: 1 INJECTION, SOLUTION INTRAMUSCULAR; INTRAVENOUS; SUBCUTANEOUS at 22:15

## 2023-05-16 RX ADMIN — ACETAMINOPHEN 650 MG: 325 TABLET ORAL at 18:00

## 2023-05-16 RX ADMIN — KETOROLAC TROMETHAMINE 15 MG: 15 INJECTION, SOLUTION INTRAMUSCULAR; INTRAVENOUS at 16:18

## 2023-05-16 ASSESSMENT — ACTIVITIES OF DAILY LIVING (ADL)
ADLS_ACUITY_SCORE: 35

## 2023-05-16 NOTE — PROGRESS NOTES
Surgery  Pt is doing fairly well.  Incision looks good.  Abdomen is soft, nondistended  1300 from NG yesterday  No flatus yet  Plan:  36 yo pod 2 from ex lap, REKHA  Ambulate, minimize narcotics  Consider clamping trial overnight  Remove NG tube when ANGELA Bhat MD  General Surgery, Office [phone]

## 2023-05-16 NOTE — PLAN OF CARE
Date & Time: 05/15/23 3-11pm  Orientation: AOx4  POD# 1  Surgeon: Dr. Bhat  Activity Level: Independent  Fall Risk: no  Behavior & Aggression: Green  Pain Management: PRN IV Dilaudid, Scheduled Toradol & Tylenol  ABNL VS/O2: VSS on RA  ABNL Lab/BG: WNL  Diet: NPO, ice chips ok  Bowel/Bladder: continent, no BM not passing gas  Drains/Devices: NG to LIS, L PIV infusing fluids  Tests/Procedures: N/A  Anticipated  DC Date: Pending return of bowl function

## 2023-05-16 NOTE — PLAN OF CARE
Goal Outcome Evaluation: POD #2 Ex lap/REKHA d/tr SBO. ABD soft; slight tender, very mild distention-improving. Pain managed with IV Dilaudid, scheduled Tylenol, Toradol. Suppository administered. Pt had small soft/formed BM this afternoon. ABD binder in place; midline incision re-dressed; well approximated, scant drainage. Medial transverse  incision wound cleaned, new dressing applied. Pt up IND for ambulation, Voiding adequately. NPO ex ice chips. NG-LIS w/ bilious/green output, Hurricane spray ordered for sore throat. Plan: NG 24-28 hours. Possible clamping trial tonight.

## 2023-05-16 NOTE — PLAN OF CARE
Shift [id number]:    POD 2 from an ex lap/REKHA due to SBO. A&O x4. CMS WDL. Denies nausea, not passing flatus yet. Pt is NPO, ex ice chips. NGT to LIS, bilious/green output. VSS, ex soft BPs. Midline abdominal dressing intact, dried drainage marked unchanged. Up independently, abdominal binder in place. C/o moderate pain, decreased with tylenol, toradol and IV dilaudid. Plan is for ROBF, ambulation and pain control.

## 2023-05-17 LAB
GLUCOSE BLDC GLUCOMTR-MCNC: 110 MG/DL (ref 70–99)
GLUCOSE BLDC GLUCOMTR-MCNC: 89 MG/DL (ref 70–99)
GLUCOSE BLDC GLUCOMTR-MCNC: 97 MG/DL (ref 70–99)
GLUCOSE BLDC GLUCOMTR-MCNC: 99 MG/DL (ref 70–99)

## 2023-05-17 PROCEDURE — 250N000011 HC RX IP 250 OP 636: Performed by: STUDENT IN AN ORGANIZED HEALTH CARE EDUCATION/TRAINING PROGRAM

## 2023-05-17 PROCEDURE — 250N000013 HC RX MED GY IP 250 OP 250 PS 637: Performed by: SURGERY

## 2023-05-17 PROCEDURE — C9113 INJ PANTOPRAZOLE SODIUM, VIA: HCPCS | Performed by: STUDENT IN AN ORGANIZED HEALTH CARE EDUCATION/TRAINING PROGRAM

## 2023-05-17 PROCEDURE — 250N000013 HC RX MED GY IP 250 OP 250 PS 637: Performed by: STUDENT IN AN ORGANIZED HEALTH CARE EDUCATION/TRAINING PROGRAM

## 2023-05-17 PROCEDURE — 120N000001 HC R&B MED SURG/OB

## 2023-05-17 PROCEDURE — 250N000011 HC RX IP 250 OP 636: Performed by: SURGERY

## 2023-05-17 PROCEDURE — 258N000003 HC RX IP 258 OP 636: Performed by: STUDENT IN AN ORGANIZED HEALTH CARE EDUCATION/TRAINING PROGRAM

## 2023-05-17 RX ORDER — CYCLOBENZAPRINE HCL 10 MG
10 TABLET ORAL 3 TIMES DAILY
Status: DISCONTINUED | OUTPATIENT
Start: 2023-05-17 | End: 2023-05-20 | Stop reason: HOSPADM

## 2023-05-17 RX ORDER — POLYETHYLENE GLYCOL 3350 17 G/17G
17 POWDER, FOR SOLUTION ORAL 2 TIMES DAILY
Status: DISCONTINUED | OUTPATIENT
Start: 2023-05-17 | End: 2023-05-20 | Stop reason: HOSPADM

## 2023-05-17 RX ADMIN — PANTOPRAZOLE SODIUM 20 MG: 40 INJECTION, POWDER, FOR SOLUTION INTRAVENOUS at 06:12

## 2023-05-17 RX ADMIN — Medication 1 LOZENGE: at 16:19

## 2023-05-17 RX ADMIN — Medication 1 LOZENGE: at 09:08

## 2023-05-17 RX ADMIN — CYCLOBENZAPRINE 10 MG: 10 TABLET, FILM COATED ORAL at 16:15

## 2023-05-17 RX ADMIN — ACETAMINOPHEN 650 MG: 325 TABLET ORAL at 18:23

## 2023-05-17 RX ADMIN — CYCLOBENZAPRINE 10 MG: 10 TABLET, FILM COATED ORAL at 21:27

## 2023-05-17 RX ADMIN — CYCLOBENZAPRINE 10 MG: 10 TABLET, FILM COATED ORAL at 09:08

## 2023-05-17 RX ADMIN — DIAZEPAM 5 MG: 5 INJECTION INTRAMUSCULAR; INTRAVENOUS at 00:06

## 2023-05-17 RX ADMIN — POTASSIUM CHLORIDE, DEXTROSE MONOHYDRATE AND SODIUM CHLORIDE: 150; 5; 450 INJECTION, SOLUTION INTRAVENOUS at 20:01

## 2023-05-17 RX ADMIN — HYDROMORPHONE HYDROCHLORIDE 0.5 MG: 1 INJECTION, SOLUTION INTRAMUSCULAR; INTRAVENOUS; SUBCUTANEOUS at 21:37

## 2023-05-17 RX ADMIN — ACETAMINOPHEN 650 MG: 325 TABLET ORAL at 06:11

## 2023-05-17 RX ADMIN — HYDROMORPHONE HYDROCHLORIDE 0.5 MG: 1 INJECTION, SOLUTION INTRAMUSCULAR; INTRAVENOUS; SUBCUTANEOUS at 08:53

## 2023-05-17 RX ADMIN — Medication 1 LOZENGE: at 12:12

## 2023-05-17 RX ADMIN — Medication 1 LOZENGE: at 19:22

## 2023-05-17 RX ADMIN — ENOXAPARIN SODIUM 40 MG: 40 INJECTION SUBCUTANEOUS at 06:13

## 2023-05-17 RX ADMIN — POLYETHYLENE GLYCOL 3350 17 G: 17 POWDER, FOR SOLUTION ORAL at 08:53

## 2023-05-17 RX ADMIN — HYDROMORPHONE HYDROCHLORIDE 0.5 MG: 1 INJECTION, SOLUTION INTRAMUSCULAR; INTRAVENOUS; SUBCUTANEOUS at 01:50

## 2023-05-17 RX ADMIN — POLYETHYLENE GLYCOL 3350 17 G: 17 POWDER, FOR SOLUTION ORAL at 21:27

## 2023-05-17 RX ADMIN — HYDROMORPHONE HYDROCHLORIDE 0.5 MG: 1 INJECTION, SOLUTION INTRAMUSCULAR; INTRAVENOUS; SUBCUTANEOUS at 16:15

## 2023-05-17 RX ADMIN — HYDROMORPHONE HYDROCHLORIDE 0.5 MG: 1 INJECTION, SOLUTION INTRAMUSCULAR; INTRAVENOUS; SUBCUTANEOUS at 12:12

## 2023-05-17 RX ADMIN — Medication 1 LOZENGE: at 01:54

## 2023-05-17 RX ADMIN — POTASSIUM CHLORIDE, DEXTROSE MONOHYDRATE AND SODIUM CHLORIDE: 150; 5; 450 INJECTION, SOLUTION INTRAVENOUS at 00:06

## 2023-05-17 RX ADMIN — Medication 1 LOZENGE: at 00:14

## 2023-05-17 RX ADMIN — Medication 1 LOZENGE: at 06:18

## 2023-05-17 ASSESSMENT — ACTIVITIES OF DAILY LIVING (ADL)
ADLS_ACUITY_SCORE: 35

## 2023-05-17 NOTE — PLAN OF CARE
POD 3 from an ex lap/REKHA due to SBO. A&O x4. CMS WDL. Bowel sounds hypoactive, not passing flatus, denies nausea. Pt is NPO, ex ice chips. NGT to LIS, bilious/green output. VSS. Midline abdominal incision dressing intact, drainage marked, no change overnight.  dressing CDI. Up independently, abdominal binder in place. C/o moderate to severe pain, decreased with IV dilaudid and tylenol. Pt c/o throat pain from NGT, lozenges utilized. Plan is for ROBF, ambulation and pain control.

## 2023-05-17 NOTE — PLAN OF CARE
Goal Outcome Evaluation: POD #3 Ex alp/REKHA d/t SBO. A/O x4, VSS, pain to ABD-generalized, managed with IV Dilaudid and new scheduled flexeril. Throat lozenge PRN for sore throat with mild relief. NG clamped 0830, no nausea/vomiting. Pt endorses flatus this AM, nothing since. BS faint, hypoactive. Pt burping. Midline dressing marked-no change.  section site dressing CDI. Pt NPO x ice. Up IND ambulating. Plan: Await ANGELA

## 2023-05-17 NOTE — PROVIDER NOTIFICATION
MD Notification    Notified Person: MD    Notified Person Name: Dr Arteaga    Notification Date/Time: 5/17/23    Notification Interaction: vocera page    Purpose of Notification: Clarification on NG order    Orders Received: Check residual; if <250 over 30 minutes, ok to pull NG tube    Comments:

## 2023-05-17 NOTE — PROGRESS NOTES
"General Surgery Progress Note    S: Increased pain overnight. Denies flatus or BM. Reports she is hungry this morning.     O:  /71 (BP Location: Right arm)   Pulse 70   Temp 97.5  F (36.4  C) (Oral)   Resp 16   Ht 1.626 m (5' 4\")   Wt 59 kg (130 lb)   SpO2 97%   Breastfeeding No   BMI 22.31 kg/m    Gen:  Awake, Alert, NAD  Resp - nonlabored breathing on RA  Cardiac - Regular rate & rhythm  Abdomen - soft, appropriately tender, nondistended. Abdominal binder in place. Incision intact without drainage or erythema.  Extremities - no lower extremity edema.    A: Patient is a 37 year old female who presented with early symptoms of small bowel obstruction on 5/14, underwent exploratory laparotomy on 5/14. Awaiting return of bowel function.    P:  -Clamp NG, ok to remove this afternoon if no nausea or emesis  -Added flexeril for abdominal spasms, continue tylenol and dilaudid for pain  -NPO, IVF  -Ambulate as tolerated  -Incentive spirometer  -VTE ppx    Patient seen and discussed with Dr. Arteaga.    Brandon Glover MD, MS 05/17/23 9:25 AM   General Surgery Resident - [id number]        "

## 2023-05-18 LAB
GLUCOSE BLDC GLUCOMTR-MCNC: 90 MG/DL (ref 70–99)
GLUCOSE BLDC GLUCOMTR-MCNC: 95 MG/DL (ref 70–99)
GLUCOSE BLDC GLUCOMTR-MCNC: 95 MG/DL (ref 70–99)

## 2023-05-18 PROCEDURE — 120N000001 HC R&B MED SURG/OB

## 2023-05-18 PROCEDURE — 250N000013 HC RX MED GY IP 250 OP 250 PS 637: Performed by: SURGERY

## 2023-05-18 PROCEDURE — 258N000003 HC RX IP 258 OP 636: Performed by: STUDENT IN AN ORGANIZED HEALTH CARE EDUCATION/TRAINING PROGRAM

## 2023-05-18 PROCEDURE — 250N000013 HC RX MED GY IP 250 OP 250 PS 637: Performed by: STUDENT IN AN ORGANIZED HEALTH CARE EDUCATION/TRAINING PROGRAM

## 2023-05-18 PROCEDURE — C9113 INJ PANTOPRAZOLE SODIUM, VIA: HCPCS | Performed by: STUDENT IN AN ORGANIZED HEALTH CARE EDUCATION/TRAINING PROGRAM

## 2023-05-18 PROCEDURE — 250N000011 HC RX IP 250 OP 636: Performed by: SURGERY

## 2023-05-18 PROCEDURE — 250N000011 HC RX IP 250 OP 636: Performed by: STUDENT IN AN ORGANIZED HEALTH CARE EDUCATION/TRAINING PROGRAM

## 2023-05-18 RX ORDER — BISACODYL 10 MG
10 SUPPOSITORY, RECTAL RECTAL ONCE
Status: COMPLETED | OUTPATIENT
Start: 2023-05-18 | End: 2023-05-18

## 2023-05-18 RX ORDER — OXYCODONE HYDROCHLORIDE 5 MG/1
5 TABLET ORAL
Status: DISCONTINUED | OUTPATIENT
Start: 2023-05-18 | End: 2023-05-20 | Stop reason: HOSPADM

## 2023-05-18 RX ADMIN — OXYCODONE HYDROCHLORIDE 5 MG: 5 TABLET ORAL at 12:51

## 2023-05-18 RX ADMIN — POTASSIUM CHLORIDE, DEXTROSE MONOHYDRATE AND SODIUM CHLORIDE: 150; 5; 450 INJECTION, SOLUTION INTRAVENOUS at 15:56

## 2023-05-18 RX ADMIN — CYCLOBENZAPRINE 10 MG: 10 TABLET, FILM COATED ORAL at 16:57

## 2023-05-18 RX ADMIN — CYCLOBENZAPRINE 10 MG: 10 TABLET, FILM COATED ORAL at 22:24

## 2023-05-18 RX ADMIN — ACETAMINOPHEN 650 MG: 325 TABLET ORAL at 18:05

## 2023-05-18 RX ADMIN — BISACODYL 10 MG: 10 SUPPOSITORY RECTAL at 08:00

## 2023-05-18 RX ADMIN — POTASSIUM CHLORIDE, DEXTROSE MONOHYDRATE AND SODIUM CHLORIDE: 150; 5; 450 INJECTION, SOLUTION INTRAVENOUS at 06:47

## 2023-05-18 RX ADMIN — POLYETHYLENE GLYCOL 3350 17 G: 17 POWDER, FOR SOLUTION ORAL at 22:23

## 2023-05-18 RX ADMIN — ENOXAPARIN SODIUM 40 MG: 40 INJECTION SUBCUTANEOUS at 06:43

## 2023-05-18 RX ADMIN — PANTOPRAZOLE SODIUM 20 MG: 40 INJECTION, POWDER, FOR SOLUTION INTRAVENOUS at 06:46

## 2023-05-18 RX ADMIN — OXYCODONE HYDROCHLORIDE 5 MG: 5 TABLET ORAL at 08:31

## 2023-05-18 RX ADMIN — CYCLOBENZAPRINE 10 MG: 10 TABLET, FILM COATED ORAL at 08:31

## 2023-05-18 RX ADMIN — POLYETHYLENE GLYCOL 3350 17 G: 17 POWDER, FOR SOLUTION ORAL at 08:33

## 2023-05-18 ASSESSMENT — ACTIVITIES OF DAILY LIVING (ADL)
ADLS_ACUITY_SCORE: 35

## 2023-05-18 NOTE — PLAN OF CARE
Goal Outcome Evaluation: POD #3 from Ex alp/REKHA due SBO. A&OX4. VSS on RA. Much improved. No N/V, pain fairly well managed with Oral Oxycodone, scheduled flexeril. Midline incision well approximated, NAVA. ABD binder in place. Up IND, ambulating. Good UOP, passing some flatus. Administered supp with small mucus stool. NPO ex sips/ice. Plan: await return of bowel function.

## 2023-05-18 NOTE — PLAN OF CARE
Goal Outcome Evaluation:  DATE & TIME: 05/18/2023, 6552-5773   Cognitive Concerns/ Orientation : A & O times four  BEHAVIOR & AGGRESSION TOOL COLOR: calm  ABNL VS/O2: VSS, room air  MOBILITY: Independent with activity   PAIN MANAGMENT: Abdominal discomfort. Took tylenol as scheduled   DIET: clear  BOWEL/BLADDER: voids per BRP, able to pass gas   ABNL LAB/BG:   DRAIN/DEVICES: PIV infusing  SKIN: abdominal incision   TESTS/PROCEDURES:   D/C DATE: Pending  OTHER IMPORTANT INFO:   POD 4 S/P exploratory laparotomy of adhesions.   She has been ambulating frequently this shift   Continue to monitor.

## 2023-05-18 NOTE — PROGRESS NOTES
Surgery  Pt feeling well, no pain or nausea  Will start clear liquids slowly overnight  Waqas Bhat MD  General Surgery, Office [phone]

## 2023-05-18 NOTE — PROGRESS NOTES
Surgery  Pt looks well, happy NG is out  Minimal flatus, no ROBF  Abdomen slightly distended but soft, nontender  Plan:  Continue to advance bowel regimen  Await ROBF before starting clear liquids  Waqas Bhat MD  General Surgery, Office [phone]

## 2023-05-18 NOTE — PLAN OF CARE
Goal Outcome Evaluation:    POD #3 from Ex alp/REKHA due to SBO. A&OX4. VSS on RA. Abdominal pain managed with IV Dilaudid, scheduled flexeril and tylenol. Active BS, passing flatus. Removed NG - tube at 1920. Denies N/V. On NPO except Ice chips and med. PIV D5%/0.45% NaCL + KCL 20 infusion at 100ml/hr. Midline dressing CDI, Abd binder in place.  dressing CDI. Void adequately. Up independently. Plans for waiting return of bowel function.

## 2023-05-19 LAB
GLUCOSE BLDC GLUCOMTR-MCNC: 100 MG/DL (ref 70–99)
GLUCOSE BLDC GLUCOMTR-MCNC: 100 MG/DL (ref 70–99)
GLUCOSE BLDC GLUCOMTR-MCNC: 101 MG/DL (ref 70–99)
GLUCOSE BLDC GLUCOMTR-MCNC: 105 MG/DL (ref 70–99)
GLUCOSE BLDC GLUCOMTR-MCNC: 98 MG/DL (ref 70–99)

## 2023-05-19 PROCEDURE — 120N000001 HC R&B MED SURG/OB

## 2023-05-19 PROCEDURE — 250N000013 HC RX MED GY IP 250 OP 250 PS 637: Performed by: SURGERY

## 2023-05-19 PROCEDURE — C9113 INJ PANTOPRAZOLE SODIUM, VIA: HCPCS | Performed by: STUDENT IN AN ORGANIZED HEALTH CARE EDUCATION/TRAINING PROGRAM

## 2023-05-19 PROCEDURE — 250N000013 HC RX MED GY IP 250 OP 250 PS 637: Performed by: STUDENT IN AN ORGANIZED HEALTH CARE EDUCATION/TRAINING PROGRAM

## 2023-05-19 PROCEDURE — 250N000011 HC RX IP 250 OP 636: Performed by: SURGERY

## 2023-05-19 PROCEDURE — 250N000011 HC RX IP 250 OP 636: Performed by: STUDENT IN AN ORGANIZED HEALTH CARE EDUCATION/TRAINING PROGRAM

## 2023-05-19 PROCEDURE — 258N000003 HC RX IP 258 OP 636: Performed by: STUDENT IN AN ORGANIZED HEALTH CARE EDUCATION/TRAINING PROGRAM

## 2023-05-19 RX ADMIN — POTASSIUM CHLORIDE, DEXTROSE MONOHYDRATE AND SODIUM CHLORIDE: 150; 5; 450 INJECTION, SOLUTION INTRAVENOUS at 14:13

## 2023-05-19 RX ADMIN — POLYETHYLENE GLYCOL 3350 17 G: 17 POWDER, FOR SOLUTION ORAL at 08:09

## 2023-05-19 RX ADMIN — CYCLOBENZAPRINE 10 MG: 10 TABLET, FILM COATED ORAL at 16:23

## 2023-05-19 RX ADMIN — ACETAMINOPHEN 650 MG: 325 TABLET ORAL at 13:05

## 2023-05-19 RX ADMIN — PANTOPRAZOLE SODIUM 20 MG: 40 INJECTION, POWDER, FOR SOLUTION INTRAVENOUS at 06:14

## 2023-05-19 RX ADMIN — CYCLOBENZAPRINE 10 MG: 10 TABLET, FILM COATED ORAL at 08:09

## 2023-05-19 RX ADMIN — ACETAMINOPHEN 650 MG: 325 TABLET ORAL at 00:22

## 2023-05-19 RX ADMIN — ACETAMINOPHEN 650 MG: 325 TABLET ORAL at 19:10

## 2023-05-19 RX ADMIN — ENOXAPARIN SODIUM 40 MG: 40 INJECTION SUBCUTANEOUS at 06:21

## 2023-05-19 RX ADMIN — POTASSIUM CHLORIDE, DEXTROSE MONOHYDRATE AND SODIUM CHLORIDE: 150; 5; 450 INJECTION, SOLUTION INTRAVENOUS at 02:41

## 2023-05-19 RX ADMIN — POLYETHYLENE GLYCOL 3350 17 G: 17 POWDER, FOR SOLUTION ORAL at 21:14

## 2023-05-19 RX ADMIN — ACETAMINOPHEN 650 MG: 325 TABLET ORAL at 06:21

## 2023-05-19 RX ADMIN — POTASSIUM CHLORIDE, DEXTROSE MONOHYDRATE AND SODIUM CHLORIDE: 150; 5; 450 INJECTION, SOLUTION INTRAVENOUS at 23:47

## 2023-05-19 ASSESSMENT — ACTIVITIES OF DAILY LIVING (ADL)
ADLS_ACUITY_SCORE: 35

## 2023-05-19 NOTE — PLAN OF CARE
A&OX4, RA, denies pain scheduled tylenol given. Incision clean, up to the bathroom independent. Passing gas, clear liquid diet. IVF infusing, .

## 2023-05-19 NOTE — PLAN OF CARE
Summary: EXPLORATORY LAPAROTOMY; LYSIS OF ADHESIONS, SBO  Date & Time: 05/19/23 9269-3855  Orientation: AOx4  POD# 5  Surgeon: Dr. Bhat  Activity Level: IND  Fall Risk: N/A  Behavior & Aggression: Green  Pain Management: Scheduled Tylenol  ABNL VS/O2: VSS on RA  ABNL Lab/BG: WNL  Diet: Full Liquid  Bowel/Bladder: Up to bathroom, No BM, Lots of Gas  Drains/Devices:PIV infusing d5% 0.45% +20KCL  Tests/Procedures: N/A  Anticipated  DC Date: Pending return of bowl function and Diet advancement     Other Important Info: Pt os Aox4, VSS on RA. Tolerating Full Liquid Diet. Pt encouraged to walk in halls. Plan is to advance to Low fiber and discharge tomorrow 5/20.

## 2023-05-19 NOTE — PROGRESS NOTES
Surgery note  Patient had a good night.  Started clears last night and had no increase in abdominal pain or nausea.  Passing much more flatus.  Abdomen is completely soft flat and nontender.  Plan to advance to full liquids at this point, see how she does.  Possibly home tonight but more likely to be tomorrow.  Waqas Bhat MD  General Surgery, Office [phone]    Addendum:  Patient doing well with full liquids.  Likely advance to low fiber tomorrow, possibly home if desired.

## 2023-05-20 VITALS
HEIGHT: 64 IN | BODY MASS INDEX: 22.2 KG/M2 | OXYGEN SATURATION: 100 % | SYSTOLIC BLOOD PRESSURE: 134 MMHG | TEMPERATURE: 98.5 F | WEIGHT: 130 LBS | RESPIRATION RATE: 16 BRPM | DIASTOLIC BLOOD PRESSURE: 82 MMHG | HEART RATE: 70 BPM

## 2023-05-20 LAB
GLUCOSE BLDC GLUCOMTR-MCNC: 105 MG/DL (ref 70–99)
GLUCOSE BLDC GLUCOMTR-MCNC: 106 MG/DL (ref 70–99)

## 2023-05-20 PROCEDURE — 250N000011 HC RX IP 250 OP 636: Performed by: STUDENT IN AN ORGANIZED HEALTH CARE EDUCATION/TRAINING PROGRAM

## 2023-05-20 PROCEDURE — C9113 INJ PANTOPRAZOLE SODIUM, VIA: HCPCS | Performed by: STUDENT IN AN ORGANIZED HEALTH CARE EDUCATION/TRAINING PROGRAM

## 2023-05-20 PROCEDURE — 250N000013 HC RX MED GY IP 250 OP 250 PS 637: Performed by: SURGERY

## 2023-05-20 PROCEDURE — 250N000013 HC RX MED GY IP 250 OP 250 PS 637: Performed by: PHYSICIAN ASSISTANT

## 2023-05-20 PROCEDURE — 250N000011 HC RX IP 250 OP 636: Performed by: SURGERY

## 2023-05-20 RX ORDER — AMOXICILLIN 250 MG
1-2 CAPSULE ORAL 2 TIMES DAILY
Status: DISCONTINUED | OUTPATIENT
Start: 2023-05-20 | End: 2023-05-20 | Stop reason: HOSPADM

## 2023-05-20 RX ORDER — POLYETHYLENE GLYCOL 3350 17 G/17G
17 POWDER, FOR SOLUTION ORAL 2 TIMES DAILY PRN
Qty: 510 G | Refills: 0 | COMMUNITY
Start: 2023-05-20

## 2023-05-20 RX ADMIN — POLYETHYLENE GLYCOL 3350 17 G: 17 POWDER, FOR SOLUTION ORAL at 08:23

## 2023-05-20 RX ADMIN — PANTOPRAZOLE SODIUM 20 MG: 40 INJECTION, POWDER, FOR SOLUTION INTRAVENOUS at 06:34

## 2023-05-20 RX ADMIN — ENOXAPARIN SODIUM 40 MG: 40 INJECTION SUBCUTANEOUS at 06:39

## 2023-05-20 RX ADMIN — ACETAMINOPHEN 650 MG: 325 TABLET ORAL at 06:33

## 2023-05-20 RX ADMIN — MAGNESIUM HYDROXIDE 30 ML: 400 SUSPENSION ORAL at 11:29

## 2023-05-20 RX ADMIN — ACETAMINOPHEN 650 MG: 325 TABLET ORAL at 11:29

## 2023-05-20 RX ADMIN — SENNOSIDES AND DOCUSATE SODIUM 1 TABLET: 50; 8.6 TABLET ORAL at 11:29

## 2023-05-20 ASSESSMENT — ACTIVITIES OF DAILY LIVING (ADL)
ADLS_ACUITY_SCORE: 35

## 2023-05-20 NOTE — PLAN OF CARE
Goal Outcome Evaluation:       Summary: EXPLORATORY LAPAROTOMY; LYSIS OF ADHESIONS, SBO  Date & Time: 05/19/23 6167-6166  Orientation: AOx4  POD# 6  Surgeon: Dr. Bhat  Activity Level: IND  Fall Risk: N/A  Behavior & Aggression: Green  Pain Management: Scheduled Tylenol  ABNL VS/O2: VSS on RA  ABNL Lab/BG: WNL  Diet: Full Liquid  Bowel/Bladder: Up to bathroom, No BM, but reports she had BM on previous shift with PRN, Lots of Gas  Drains/Devices:PIV infusing d5% 0.45% +20KCL  Tests/Procedures: N/A  Anticipated  DC Date: Discharge likely today.     Other Important Info: Pt os Aox4, VSS on RA. Tolerating Full Liquid Diet. Pt encouraged to walk in halls. Plan is to advance to Low fiber and discharge today 5/20.

## 2023-05-20 NOTE — DISCHARGE SUMMARY
[facility] Discharge Summary    Aicha Draper MRN# [id number]   Age: 37 year old YOB: 1985     Date of Admission:  5/14/2023  Date of Discharge:  5/20/2023 11:44 AM  Admitting Provider:  Brandon Bhat MD  Discharge Provider:  Ling Castrejon PA-C with Dr. Bhat  Discharging Service: General Surgery     Primary Provider: Kamila Roland  Primary Care Physician Phone Number: [phone]          Admission Diagnoses:   Principle Diagnosis: Small bowel obstruction (H) [K56.609]          Discharge Diagnosis:   Same as above          Procedures:   Procedure(s): Exploratory laparotomy, lysis of adhesions            Discharge Medications:     Discharge Medication List as of 5/20/2023 10:15 AM      START taking these medications    Details   polyethylene glycol (MIRALAX) 17 GM/Dose powder Take 17 g by mouth 2 times daily as needed for constipation, Disp-510 g, R-0, OTC         CONTINUE these medications which have NOT CHANGED    Details   acetaminophen (TYLENOL) 500 MG tablet Take 2 tablets (1,000 mg) by mouth every 6 hours as needed for mild pain, No Print Out      calcium carbonate (TUMS) 500 MG chewable tablet Take 2 chew tab by mouth 4 times daily as needed for heartburn, Historical      fish oil-omega-3 fatty acids 1000 MG capsule Take 2 g by mouth every evening, Historical      ibuprofen (ADVIL/MOTRIN) 200 MG tablet Take 2 tablets (400 mg) by mouth every 6 hours as needed for pain, No Print Out      omeprazole (PRILOSEC) 20 MG DR capsule Take 20 mg by mouth every evening, Historical      Prenatal Vit-Fe Fumarate-FA (PRENATAL MULTIVITAMIN W/IRON) 27-0.8 MG tablet Take 1 tablet by mouth daily, Historical      senna-docusate (SENOKOT-S/PERICOLACE) 8.6-50 MG tablet Take 2 tablets by mouth daily, Disp-60 tablet, R-0, E-Prescribe                 Allergies:         Allergies   Allergen Reactions     No Known Drug Allergy              Brief History of Illness:   Aicha Draper has a  "complicated recent surgical history including multiple intra-abdominal abscesses after .  She presented to the emergency department with acute abdominal pain and a CT scan suggested a long segment of jejunum which appeared to be congested with mesenteric edema and concern for closed-loop bowel obstruction After discussing the risks, benefits, and possible complications, informed consent was obtained and the patient underwent the above procedure.  There were no complications.  Please see the Operative Report for full details.           Hospital Course:   Aicha Draper's hospital course was unremarkable. Her NG was removed on POD 3. She recovered as anticipated and experienced no post-operative complications. She was passing gas and her diet was advanced in a stepwise fashion. On the date of discharge, the patient was discharged to home in stable condition and afebrile. She was tolerating low fiber diet without nausea, had good pain control on oral medications, was ambulating independently, was afebrile and appropriate for discharge. She verbalized understanding of all discharge instructions and felt comfortable with the discharge plan.  She will follow-up in our clinic in 2-3 weeks and was asked to call with any further questions or concerns.         Condition on Discharge:      Discharge condition: Stable   Discharge vitals: Blood pressure 134/82, pulse 70, temperature 98.5  F (36.9  C), temperature source Oral, resp. rate 16, height 1.626 m (5' 4\"), weight 59 kg (130 lb), SpO2 100 %, not currently breastfeeding.           Discharge Disposition:   Discharged to home          Discharge Instructions and Follow-Up:      Aicha Draper was asked to follow up with surgical team in 2-3 weeks. See AVS for complete discharge instructions.      Ling Castrejon PA-C  [facility]  -[phone]      "

## 2023-05-20 NOTE — PLAN OF CARE
Pt here POD #5 for hernia repair. A&O x4. Neuros intact. VSS. Full liquid diet, thin liquids. Takes pills whole with water. Up independently. Tylenol given for abdominal pain. Pt scoring green on the Aggression Stop Light Tool. Plan to potentially discharge tomorrow pending medical readiness.

## 2023-05-20 NOTE — PROGRESS NOTES
"General Surgery Progress Note    Admission Date: 5/14/2023  Today's Date: 5/20/2023         Assessment:      Aicha Draper is a 37 year old female who underwent exploratory laparotomy with extensive adhesiolysis on 5/14 for closed loop small bowel obstruction         Plan:   - Continue low fiber diet until good return of bowel function  - Give milk of mag this morning to stimulate BM. Allyson is passing gas. Will continue BID miralax and add senna-docusate  - Pain meds available prn, using only tylenol. Has some oxycodone at home  - Lovenox daily while in hospital, PCDs while resting. Continue with frequent out of bed, ambulation  - Saline lock IV fluids    Dispo: home today  - Discharge instructions reviewed, questions answered  - Follow-up in general surgery clinic in approximately 2 weeks for recheck  - Extended work release note provided        Interval History:   Afebrile, vitals stable on room air. Allyson is feeling well today. She is passing gas, tolerating good intake of low fiber diet. She is up moving well, voiding fine. No BM yet since surgery, but she has had very little oral intake. Pain is controlled with tylenol. Very eager to discharge home today.          Physical Exam:   /82 (BP Location: Right arm)   Pulse 70   Temp 98.5  F (36.9  C) (Oral)   Resp 16   Ht 1.626 m (5' 4\")   Wt 59 kg (130 lb)   SpO2 100%   Breastfeeding No   BMI 22.31 kg/m    I/O last 3 completed shifts:  In: 2929 [I.V.:2929]  Out: -   General: NAD, pleasant, alert and oriented x3  Cardiovascular: regular rate and rhythm; S1 and S2 distinct without murmur   Respiratory: lungs clear to auscultation bilaterally without wheezes, rales or rhonchi   Abdomen: soft, appropriately tender around incision, non distended  Incisions: midline laparotomy incision is clean, dry, and intact - open to air. No erythema or drainage. Pfannenstiel incision/wound has healed well with only a very small dry area superficially. Lap sites " well-healed.   Extremities: no lower extremity edema, no calf tenderness    -------------------------------    Ling Castrejon PA-C  [facility]  [phone]

## 2023-05-20 NOTE — PLAN OF CARE
Patient discharging home with family. AVS gone over with patient in room and prescriptions given to patient. All Questions answered.     Maria Antonia Pagan RN

## 2023-05-26 ENCOUNTER — OFFICE VISIT (OUTPATIENT)
Dept: FAMILY MEDICINE | Facility: CLINIC | Age: 38
End: 2023-05-26
Payer: COMMERCIAL

## 2023-05-26 ENCOUNTER — TELEPHONE (OUTPATIENT)
Dept: SURGERY | Facility: CLINIC | Age: 38
End: 2023-05-26

## 2023-05-26 VITALS
HEART RATE: 113 BPM | OXYGEN SATURATION: 97 % | DIASTOLIC BLOOD PRESSURE: 69 MMHG | TEMPERATURE: 98.2 F | BODY MASS INDEX: 21.48 KG/M2 | HEIGHT: 64 IN | RESPIRATION RATE: 16 BRPM | WEIGHT: 125.8 LBS | SYSTOLIC BLOOD PRESSURE: 111 MMHG

## 2023-05-26 DIAGNOSIS — Z98.891 S/P CESAREAN SECTION: ICD-10-CM

## 2023-05-26 DIAGNOSIS — N73.9 PELVIC ABSCESS IN FEMALE: ICD-10-CM

## 2023-05-26 DIAGNOSIS — B99.9 INTRA-ABDOMINAL INFECTION: ICD-10-CM

## 2023-05-26 DIAGNOSIS — Z12.4 CERVICAL CANCER SCREENING: ICD-10-CM

## 2023-05-26 DIAGNOSIS — D62 ANEMIA DUE TO BLOOD LOSS, ACUTE: ICD-10-CM

## 2023-05-26 DIAGNOSIS — Z87.19 S/P SMALL BOWEL OBSTRUCTION: ICD-10-CM

## 2023-05-26 DIAGNOSIS — Z00.00 ROUTINE GENERAL MEDICAL EXAMINATION AT A HEALTH CARE FACILITY: Primary | ICD-10-CM

## 2023-05-26 PROBLEM — E87.6 HYPOKALEMIA: Status: RESOLVED | Noted: 2023-03-29 | Resolved: 2023-05-26

## 2023-05-26 PROBLEM — I50.9 ACUTE CONGESTIVE HEART FAILURE, UNSPECIFIED HEART FAILURE TYPE (H): Status: RESOLVED | Noted: 2023-03-29 | Resolved: 2023-05-26

## 2023-05-26 PROCEDURE — 99385 PREV VISIT NEW AGE 18-39: CPT | Performed by: PHYSICIAN ASSISTANT

## 2023-05-26 ASSESSMENT — ENCOUNTER SYMPTOMS
NERVOUS/ANXIOUS: 0
MYALGIAS: 0
HEMATURIA: 0
ABDOMINAL PAIN: 0
HEADACHES: 0
HEARTBURN: 1
ARTHRALGIAS: 0
FEVER: 0
HEMATOCHEZIA: 0
PALPITATIONS: 0
CHILLS: 0
WEAKNESS: 0
FREQUENCY: 0
PARESTHESIAS: 0
COUGH: 0
DIARRHEA: 0
SHORTNESS OF BREATH: 0
NAUSEA: 0
JOINT SWELLING: 0
SORE THROAT: 0
DIZZINESS: 0
EYE PAIN: 0
DYSURIA: 0
CONSTIPATION: 0

## 2023-05-26 ASSESSMENT — PAIN SCALES - GENERAL: PAINLEVEL: NO PAIN (0)

## 2023-05-26 NOTE — PROGRESS NOTES
SUBJECTIVE:   CC: Allyson is an 37 year old who presents for preventive health visit.       2023     8:55 AM   Additional Questions   Roomed by Arielle CAMPBELL   Patient has been advised of split billing requirements and indicates understanding: Yes  Healthy Habits:     Getting at least 3 servings of Calcium per day:  Yes    Bi-annual eye exam:  NO    Dental care twice a year:  Yes    Sleep apnea or symptoms of sleep apnea:  None    Diet:  Regular (no restrictions)    Frequency of exercise:  4-5 days/week    Duration of exercise:  45-60 minutes    Taking medications regularly:  Yes    Medication side effects:  None    PHQ-2 Total Score: 0    Additional concerns today:  No    3/5/2023 - son born via . At 2 week post-partum reports had no improvement in LE swelling and abdominal pain. Then had rupture/leakage of  wound that was packed. Unfortunately still worsened and ultimately went to ER after recheck and found to have multiple intra-abdominal abscesses requiring abdominal wash out with drain, wound-vac x3 weeks. Acute blood loss anemia requiring PRBC transfusion, wide complex tachycardia felt to be secondary to anemia, infection, severe hypokalemia. Normal echo and fortunately resolved with electrolyte replacement.  Hx complicated further by SBO sustained on Mother's Day requiring additional laparoscopy for lysis of adhesions. Planning to schedule 2-3 week follow-up with gen surg. Overall feeling better than she was. Denies fever, vomiting. Occasional abdominal discomfort if walking too much or holding her baby for too long. Otherwise significantly better then previous. Utilizing abdominal binder which helps.   Ibuprofen prn, but hasn't been taking very often.  Was induced and had 30-hour labor   No current breast-feeding due to all that occurred noted above.    Reports required IVF for pregnancy and had pap through them - reports was normal - [facility]. Will obtain records. Had  been temporarily on levothyroxine during IVF.     No hx of DM     Lipid panel  - normal. Has done annually through work program - will send via [employer] for records.    Sochx:         Today's PHQ-2 Score:       2023     8:09 AM   PHQ-2 (  [employer])   Q1: Little interest or pleasure in doing things 0   Q2: Feeling down, depressed or hopeless 0   PHQ-2 Score 0   Q1: Little interest or pleasure in doing things Not at all   Q2: Feeling down, depressed or hopeless Not at all   PHQ-2 Score 0       Have you ever done Advance Care Planning? (For example, a Health Directive, POLST, or a discussion with a medical provider or your loved ones about your wishes): No, advance care planning information given to patient to review.  Patient plans to discuss their wishes with loved ones or provider.      Social History     Tobacco Use     Smoking status: Never     Smokeless tobacco: Never   Vaping Use     Vaping status: Never Used     Passive vaping exposure: Yes   Substance Use Topics     Alcohol use: Not Currently     Comment: rarely             2023     8:09 AM   Alcohol Use   Prescreen: >3 drinks/day or >7 drinks/week? No     Reviewed orders with patient.  Reviewed health maintenance and updated orders accordingly - Yes  Patient Active Problem List   Diagnosis     CARDIOVASCULAR SCREENING; LDL GOAL LESS THAN 160     Insomnia     Migraine headache     Atypical squamous cells of undetermined significance (ASCUS) on Papanicolaou smear of cervix     Ganglion cyst of wrist, left     Weakness of both hips     S/P  section     Anemia due to blood loss, acute     Wound infection     S/P      Anemia, unspecified type     Pelvic abscess in female     Intra-abdominal infection     Small bowel obstruction (H)     Past Surgical History:   Procedure Laterality Date      SECTION N/A 3/5/2023    Procedure:  section;  Surgeon: Guerda Hamm MD;  [facility]: [facility]      CHOLECYSTECTOMY, LAPOROSCOPIC  Feb 4 2010    Cholecystectomy, Laparoscopic     IR ABSCESS TUBE CHANGE  4/3/2023     LAPAROSCOPY DIAGNOSTIC (GENERAL) N/A 4/8/2023    Procedure: Diagnostic laparoscopy, laparoscopic lavage with abdominal drain placement x2  wound vac placement  ;  Surgeon: Autumn Arteaga MD;  Location: [facility] OR     LAPAROTOMY EXPLORATORY N/A 5/14/2023    Procedure: EXPLORATORY LAPAROTOMY; LYSIS OF ADHESIONS;  Surgeon: Brandon Bhat MD;  Location: [facility] OR     SURGICAL HISTORY OF -   1997    STOMACH SURGERY REMOVE HAIRBALL     ZZC APPENDECTOMY  1997    incidental with above       Social History     Tobacco Use     Smoking status: Never     Smokeless tobacco: Never   Vaping Use     Vaping status: Never Used     Passive vaping exposure: Yes   Substance Use Topics     Alcohol use: Not Currently     Comment: rarely     Family History   Problem Relation Age of Onset     Hypertension Mother      Hyperlipidemia Mother      Arthritis Father         onset 23- 24 yrs     C.A.D. Maternal Grandmother      C.A.D. Maternal Grandfather      Prostate Cancer Maternal Grandfather      Diabetes Paternal Grandmother      Arthritis Paternal Grandmother      C.A.D. Paternal Grandfather      Arthritis Paternal Grandfather      Breast Cancer Other      Colon Cancer No family hx of          Current Outpatient Medications   Medication Sig Dispense Refill     acetaminophen (TYLENOL) 500 MG tablet Take 2 tablets (1,000 mg) by mouth every 6 hours as needed for mild pain       calcium carbonate (TUMS) 500 MG chewable tablet Take 2 chew tab by mouth 4 times daily as needed for heartburn       fish oil-omega-3 fatty acids 1000 MG capsule Take 2 g by mouth every evening       ibuprofen (ADVIL/MOTRIN) 200 MG tablet Take 2 tablets (400 mg) by mouth every 6 hours as needed for pain       omeprazole (PRILOSEC) 20 MG DR capsule Take 20 mg by mouth every evening       polyethylene glycol (MIRALAX) 17 GM/Dose powder Take 17 g by mouth 2  "times daily as needed for constipation 510 g 0     senna-docusate (SENOKOT-S/PERICOLACE) 8.6-50 MG tablet Take 2 tablets by mouth daily 60 tablet 0     Allergies   Allergen Reactions     No Known Drug Allergy        Breast Cancer Screening:    FHS-7:        View : No data to display.                Patient under 40 years of age: Routine Mammogram Screening not recommended.   Pertinent mammograms are reviewed under the imaging tab.    History of abnormal Pap smear: YES - updated in Problem List and Health Maintenance accordingly      12/15/2016    12:00 AM 12/3/2015    12:00 AM 11/21/2012     1:38 PM   PAP / HPV   PAP (Historical) NIL   ASC-US   NIL       Reviewed and updated as needed this visit by clinical staff   Tobacco  Allergies  Meds              Reviewed and updated as needed this visit by Provider                     Review of Systems   Constitutional: Negative for chills and fever.   HENT: Negative for congestion, ear pain, hearing loss and sore throat.    Eyes: Negative for pain and visual disturbance.   Respiratory: Negative for cough and shortness of breath.    Cardiovascular: Negative for chest pain, palpitations and peripheral edema.   Gastrointestinal: Positive for heartburn. Negative for abdominal pain, constipation, diarrhea, hematochezia and nausea.   Genitourinary: Negative for dysuria, frequency, genital sores, hematuria and urgency.   Musculoskeletal: Negative for arthralgias, joint swelling and myalgias.   Skin: Negative for rash.   Neurological: Negative for dizziness, weakness, headaches and paresthesias.   Psychiatric/Behavioral: Negative for mood changes. The patient is not nervous/anxious.         OBJECTIVE:   /69 (BP Location: Right arm, Patient Position: Sitting, Cuff Size: Adult Regular)   Pulse 113   Temp 98.2  F (36.8  C) (Oral)   Resp 16   Ht 1.626 m (5' 4\")   Wt 57.1 kg (125 lb 12.8 oz)   LMP  (LMP Unknown)   SpO2 97%   Breastfeeding No   BMI 21.59 kg/m    Physical " Exam  GENERAL: healthy, alert and no distress  EYES: Eyes grossly normal to inspection, PERRL and conjunctivae and sclerae normal  HENT: ear canals and TM's normal, nose and mouth without ulcers or lesions  NECK: no adenopathy, no asymmetry, masses, or scars and thyroid normal to palpation  RESP: lungs clear to auscultation - no rales, rhonchi or wheezes  BREAST: deferred  CV: regular rate and rhythm, normal S1 S2, no S3 or S4, no murmur, click or rub, no peripheral edema and peripheral pulses strong  ABDOMEN: soft, well-appearing incisional scars per pictures below without purulent discharge or secondary cellulitis - mild lower abdominal discomfort bilaterally, but pt reports much better than pervious. no hepatosplenomegaly, no masses and bowel sounds normal              MS: no gross musculoskeletal defects noted, no edema  SKIN: no suspicious lesions or rashes  NEURO: Normal strength and tone, mentation intact and speech normal  PSYCH: mentation appears normal, affect normal/bright    Diagnostic Test Results:  Labs reviewed in Epic    ASSESSMENT/PLAN:   Aicha was seen today for physical.    Diagnoses and all orders for this visit:    Routine general medical examination at a health care facility  -     Reviewed preventative health recommendations for age. Up to date on lipid and glucose screening.  - REVIEW OF HEALTH MAINTENANCE PROTOCOL ORDERS    S/P  section  Pelvic abscess in female  Intra-abdominal infection  Anemia due to blood loss, acute  S/p small bowel obstruction   -3/5/2023 - son born via  complicated by secondary pelvic abscess requiring abdominal wash out, acute blood loss anemia with transfusion and small bowel obstruction with additional laparoscopy due to adhesions. Fortunately, she now finally doing better with healing of previous incisional scars noted on exam without secondary infection or drainage. Planning to see gen surg for her 2-3 week follow-up and encouraged to follow-up  as planned.    Cervical cancer screening   - Reports done during her evaluation for IVF with [facility]. Complete record release today.            COUNSELING:  Reviewed preventive health counseling, as reflected in patient instructions       Regular exercise       Healthy diet/nutrition       Family planning        She reports that she has never smoked. She has never used smokeless tobacco.      Jennie Peters PA-C  [facility] [place]

## 2023-05-26 NOTE — TELEPHONE ENCOUNTER
Patient had a diagnostic lap, lap lavage with abdominal drain placed 4/8/23 MELVI, needs to f/u in June and theres are no appointments available.  Wants to see MELVI, not PA    Please call    Phone: [phone]  Message ok

## 2023-06-01 ENCOUNTER — HEALTH MAINTENANCE LETTER (OUTPATIENT)
Age: 38
End: 2023-06-01

## 2023-06-11 ENCOUNTER — APPOINTMENT (OUTPATIENT)
Dept: CT IMAGING | Facility: CLINIC | Age: 38
End: 2023-06-11
Attending: EMERGENCY MEDICINE
Payer: COMMERCIAL

## 2023-06-11 ENCOUNTER — HOSPITAL ENCOUNTER (EMERGENCY)
Facility: CLINIC | Age: 38
Discharge: HOME | End: 2023-06-12
Attending: EMERGENCY MEDICINE | Admitting: EMERGENCY MEDICINE
Payer: COMMERCIAL

## 2023-06-11 DIAGNOSIS — R10.84 ABDOMINAL PAIN, GENERALIZED: ICD-10-CM

## 2023-06-11 LAB
ALBUMIN SERPL BCG-MCNC: 4.7 G/DL (ref 3.5–5.2)
ALP SERPL-CCNC: 101 U/L (ref 35–104)
ALT SERPL W P-5'-P-CCNC: 14 U/L (ref 10–35)
ANION GAP SERPL CALCULATED.3IONS-SCNC: 13 MMOL/L (ref 7–15)
AST SERPL W P-5'-P-CCNC: 10 U/L (ref 10–35)
BASOPHILS # BLD AUTO: 0.1 10E3/UL (ref 0–0.2)
BASOPHILS NFR BLD AUTO: 1 %
BILIRUB SERPL-MCNC: 0.3 MG/DL
BUN SERPL-MCNC: 9.9 MG/DL (ref 6–20)
CALCIUM SERPL-MCNC: 10.1 MG/DL (ref 8.6–10)
CHLORIDE SERPL-SCNC: 100 MMOL/L (ref 98–107)
CREAT SERPL-MCNC: 0.69 MG/DL (ref 0.51–0.95)
DEPRECATED HCO3 PLAS-SCNC: 24 MMOL/L (ref 22–29)
EOSINOPHIL # BLD AUTO: 0.5 10E3/UL (ref 0–0.7)
EOSINOPHIL NFR BLD AUTO: 6 %
ERYTHROCYTE [DISTWIDTH] IN BLOOD BY AUTOMATED COUNT: 11.5 % (ref 10–15)
GFR SERPL CREATININE-BSD FRML MDRD: >90 ML/MIN/1.73M2
GLUCOSE SERPL-MCNC: 97 MG/DL (ref 70–99)
HCG SERPL QL: NEGATIVE
HCT VFR BLD AUTO: 42.9 % (ref 35–47)
HGB BLD-MCNC: 14.8 G/DL (ref 11.7–15.7)
IMM GRANULOCYTES # BLD: 0 10E3/UL
IMM GRANULOCYTES NFR BLD: 0 %
LYMPHOCYTES # BLD AUTO: 3.5 10E3/UL (ref 0.8–5.3)
LYMPHOCYTES NFR BLD AUTO: 40 %
MCH RBC QN AUTO: 31.1 PG (ref 26.5–33)
MCHC RBC AUTO-ENTMCNC: 34.5 G/DL (ref 31.5–36.5)
MCV RBC AUTO: 90 FL (ref 78–100)
MONOCYTES # BLD AUTO: 0.6 10E3/UL (ref 0–1.3)
MONOCYTES NFR BLD AUTO: 6 %
NEUTROPHILS # BLD AUTO: 4.3 10E3/UL (ref 1.6–8.3)
NEUTROPHILS NFR BLD AUTO: 47 %
NRBC # BLD AUTO: 0 10E3/UL
NRBC BLD AUTO-RTO: 0 /100
PLATELET # BLD AUTO: 265 10E3/UL (ref 150–450)
POTASSIUM SERPL-SCNC: 4.2 MMOL/L (ref 3.4–5.3)
PROT SERPL-MCNC: 7.5 G/DL (ref 6.4–8.3)
RBC # BLD AUTO: 4.76 10E6/UL (ref 3.8–5.2)
SODIUM SERPL-SCNC: 137 MMOL/L (ref 136–145)
WBC # BLD AUTO: 9 10E3/UL (ref 4–11)

## 2023-06-11 PROCEDURE — 74177 CT ABD & PELVIS W/CONTRAST: CPT

## 2023-06-11 PROCEDURE — 250N000009 HC RX 250: Performed by: EMERGENCY MEDICINE

## 2023-06-11 PROCEDURE — 250N000011 HC RX IP 250 OP 636: Performed by: EMERGENCY MEDICINE

## 2023-06-11 PROCEDURE — 84703 CHORIONIC GONADOTROPIN ASSAY: CPT | Performed by: EMERGENCY MEDICINE

## 2023-06-11 PROCEDURE — 36415 COLL VENOUS BLD VENIPUNCTURE: CPT | Performed by: EMERGENCY MEDICINE

## 2023-06-11 PROCEDURE — 80053 COMPREHEN METABOLIC PANEL: CPT | Performed by: EMERGENCY MEDICINE

## 2023-06-11 PROCEDURE — 85025 COMPLETE CBC W/AUTO DIFF WBC: CPT | Performed by: EMERGENCY MEDICINE

## 2023-06-11 PROCEDURE — 96374 THER/PROPH/DIAG INJ IV PUSH: CPT | Mod: 59

## 2023-06-11 PROCEDURE — 99285 EMERGENCY DEPT VISIT HI MDM: CPT | Mod: 25

## 2023-06-11 RX ORDER — KETOROLAC TROMETHAMINE 15 MG/ML
15 INJECTION, SOLUTION INTRAMUSCULAR; INTRAVENOUS ONCE
Status: COMPLETED | OUTPATIENT
Start: 2023-06-11 | End: 2023-06-11

## 2023-06-11 RX ORDER — IOPAMIDOL 755 MG/ML
63 INJECTION, SOLUTION INTRAVASCULAR ONCE
Status: COMPLETED | OUTPATIENT
Start: 2023-06-11 | End: 2023-06-11

## 2023-06-11 RX ADMIN — IOPAMIDOL 63 ML: 755 INJECTION, SOLUTION INTRAVENOUS at 23:13

## 2023-06-11 RX ADMIN — SODIUM CHLORIDE 60 ML: 9 INJECTION, SOLUTION INTRAVENOUS at 23:13

## 2023-06-11 RX ADMIN — KETOROLAC TROMETHAMINE 15 MG: 15 INJECTION, SOLUTION INTRAMUSCULAR; INTRAVENOUS at 22:49

## 2023-06-11 ASSESSMENT — ACTIVITIES OF DAILY LIVING (ADL): ADLS_ACUITY_SCORE: 35

## 2023-06-12 VITALS
OXYGEN SATURATION: 98 % | HEART RATE: 68 BPM | RESPIRATION RATE: 18 BRPM | DIASTOLIC BLOOD PRESSURE: 67 MMHG | SYSTOLIC BLOOD PRESSURE: 106 MMHG | TEMPERATURE: 98.1 F

## 2023-06-12 LAB
ALBUMIN UR-MCNC: NEGATIVE MG/DL
APPEARANCE UR: CLEAR
BILIRUB UR QL STRIP: NEGATIVE
COLOR UR AUTO: NORMAL
GLUCOSE UR STRIP-MCNC: NEGATIVE MG/DL
HGB UR QL STRIP: NEGATIVE
KETONES UR STRIP-MCNC: NEGATIVE MG/DL
LEUKOCYTE ESTERASE UR QL STRIP: NEGATIVE
NITRATE UR QL: NEGATIVE
PH UR STRIP: 6 [PH] (ref 5–7)
RBC URINE: 1 /HPF
SP GR UR STRIP: 1 (ref 1–1.03)
SQUAMOUS EPITHELIAL: <1 /HPF
UROBILINOGEN UR STRIP-MCNC: NORMAL MG/DL
WBC URINE: 1 /HPF

## 2023-06-12 PROCEDURE — 81001 URINALYSIS AUTO W/SCOPE: CPT | Performed by: EMERGENCY MEDICINE

## 2023-06-12 PROCEDURE — 250N000013 HC RX MED GY IP 250 OP 250 PS 637: Performed by: EMERGENCY MEDICINE

## 2023-06-12 RX ORDER — MAGNESIUM HYDROXIDE/ALUMINUM HYDROXICE/SIMETHICONE 120; 1200; 1200 MG/30ML; MG/30ML; MG/30ML
15 SUSPENSION ORAL ONCE
Status: COMPLETED | OUTPATIENT
Start: 2023-06-12 | End: 2023-06-12

## 2023-06-12 RX ADMIN — ALUMINUM HYDROXIDE, MAGNESIUM HYDROXIDE, AND SIMETHICONE 15 ML: 200; 200; 20 SUSPENSION ORAL at 00:39

## 2023-06-12 ASSESSMENT — ACTIVITIES OF DAILY LIVING (ADL): ADLS_ACUITY_SCORE: 35

## 2023-06-12 NOTE — ED TRIAGE NOTES
Pt comes in with c/o upper abd pain. Has had multiple recent abdominal surgeries, most recently for a SBO. Pt states this pain is similar but not as severe as that was. Appears uncomfortable. Denies any N/V/D. VSS

## 2023-06-12 NOTE — ED PROVIDER NOTES
History     Chief Complaint:  Abdominal Pain     HPI   Aicha Draper is a 37 year old female who presents to the emergency department for abdominal pain.  Patient has a complicated recent abdominal surgical history.  She delivered by  section in March and in April had pelvic abscesses.  These were drained and she continues to have an open area of her  wound.  In May she developed a bowel obstruction secondary to adhesions and had open laparotomy for her bowel obstruction.  She presents today with pain similar how her bowel obstruction felt earlier in the course but does not have nausea or vomiting yet.  While her pain is mostly epigastric she notes that her lower abdomen is tender to the touch, which it has not been since her surgery.  She has not had fevers.  Has no other areas of pain.  No other complaints.    Review of External Notes:   Reviewed operative note by Dr. Bhat and recent admission notes.    Medications:    Prilosec  metformin      Past Medical History:    Acute congestive heart failure  Atypical squamous cells of undetermined significance   Failed vacuum extraction delivery   PCOS (polycystic ovarian syndrome)     Past Surgical History:     section  Cholecystectomy  Abscess tube  Appendectomy     Physical Exam     Patient Vitals for the past 24 hrs:   BP Temp Temp src Pulse Resp SpO2   23 2344 106/67 -- -- 68 -- 100 %   23 2156 125/83 98.1  F (36.7  C) Oral 89 16 98 %        Physical Exam  General: Resting on the gurney, appears somewhat uncomfortable  Head:  The scalp, face, and head appear normal  Mouth/Throat: Mucus membranes are moist  CV:  Regular rate    Normal S1 and S2  No pathological murmur   Resp:  Breath sounds clear and equal bilaterally    Non-labored, no retractions or accessory muscle use    No coarseness    No wheezing   GI:  Midline abdominal incision.  Minimal upper abdominal tenderness palpation.  Diffuse lower abdominal tenderness  palpation.   scar is well-healed laterally and bandaged medially over the open portion.  MS:  Normal motor assessment of all extremities.    Good capillary refill noted.  Skin:  No rash or lesions noted.  Neuro: Speech is normal and fluent. No apparent deficit.  Psych: Awake. Alert.  Normal affect.      Appropriate interactions.    Emergency Department Course   Imaging:  CT Abdomen Pelvis w Contrast   Final Result   IMPRESSION:    1.  Resolved mechanical small bowel obstruction seen previously. Gas and stool material within normal caliber bowel. No free gas or free fluid. Small hiatal hernia.      2.  Postoperative changes of prior  section. Cholecystectomy. No urinary tract calculi or obstruction.         Report per radiology    Laboratory:  Labs Ordered and Resulted from Time of ED Arrival to Time of ED Departure   COMPREHENSIVE METABOLIC PANEL - Abnormal       Result Value    Sodium 137      Potassium 4.2      Chloride 100      Carbon Dioxide (CO2) 24      Anion Gap 13      Urea Nitrogen 9.9      Creatinine 0.69      Calcium 10.1 (*)     Glucose 97      Alkaline Phosphatase 101      AST 10      ALT 14      Protein Total 7.5      Albumin 4.7      Bilirubin Total 0.3      GFR Estimate >90     ROUTINE UA WITH MICROSCOPIC REFLEX TO CULTURE - Normal    Color Urine Light Yellow      Appearance Urine Clear      Glucose Urine Negative      Bilirubin Urine Negative      Ketones Urine Negative      Specific Gravity Urine 1.005      Blood Urine Negative      pH Urine 6.0      Protein Albumin Urine Negative      Urobilinogen Urine Normal      Nitrite Urine Negative      Leukocyte Esterase Urine Negative      RBC Urine 1      WBC Urine 1      Squamous Epithelials Urine <1     HCG QUALITATIVE PREGNANCY - Normal    hCG Serum Qualitative Negative     CBC WITH PLATELETS AND DIFFERENTIAL    WBC Count 9.0      RBC Count 4.76      Hemoglobin 14.8      Hematocrit 42.9      MCV 90      MCH 31.1      MCHC 34.5      RDW  11.5      Platelet Count 265      % Neutrophils 47      % Lymphocytes 40      % Monocytes 6      % Eosinophils 6      % Basophils 1      % Immature Granulocytes 0      NRBCs per 100 WBC 0      Absolute Neutrophils 4.3      Absolute Lymphocytes 3.5      Absolute Monocytes 0.6      Absolute Eosinophils 0.5      Absolute Basophils 0.1      Absolute Immature Granulocytes 0.0      Absolute NRBCs 0.0        Emergency Department Course & Assessments:  Interventions:  Medications   ketorolac (TORADOL) injection 15 mg (15 mg Intravenous $Given 6/11/23 9989)   iopamidol (ISOVUE-370) solution 63 mL (63 mLs Intravenous $Given 6/11/23 2313)   Saline (60 mLs Intravenous $Given 6/11/23 2313)   alum & mag hydroxide-simethicone (MAALOX) suspension 15 mL (15 mLs Oral $Given 6/12/23 0039)     Assessments:  2218 I obtained history and examined the patient as noted above.     Disposition:  The patient was discharged to home.     Impression & Plan    Medical Decision Making:  Aicha Draper is a 37 year old female who presents with abdominal pain.  The patient does have a complicated surgical history and has had a recent bowel obstruction.  She has epigastric pain similar to her obstruction but no nausea or vomiting.  Given her history a CT scan was obtained and thankfully was unremarkable.  I was concerned for gastritis versus ulcer causing her symptoms and attempted treatment the GI cocktail.  This did not change her pain.  Her upper abdomen is not particularly tender to palpation therefore I did not obtain a right upper quadrant ultrasound.  Her lower abdomen was somewhat tender, however, there was no evidence of recurrent abscess or inflammatory changes in the pelvis on her CT.  Given her history I do believe prompt follow-up within 24 hours is appropriate.  She is well connected with both her surgical and gynecologic team and have recommended she have a repeat abdominal exam within 24 hours.  She will return for fevers, vomiting,  worsened pain, or any other worrisome symptoms.      Diagnosis:    ICD-10-CM    1. Abdominal pain, generalized  R10.84              6/11/2023   Amina Del Rio MD Debroux, Karah M, MD  06/12/23 0227

## 2023-06-12 NOTE — DISCHARGE INSTRUCTIONS
Your imaging today was reassuring, however, given your history I would like you to have a repeat abdominal exam within the next 36 hours.  Return immediately for fevers, worsening pain, vomiting.    If you feel your condition has changed or worsened, please call your doctor or return to the emergency department right away.

## 2023-06-20 ENCOUNTER — OFFICE VISIT (OUTPATIENT)
Dept: SURGERY | Facility: CLINIC | Age: 38
End: 2023-06-20
Payer: COMMERCIAL

## 2023-06-20 DIAGNOSIS — K59.09 OTHER CONSTIPATION: ICD-10-CM

## 2023-06-20 DIAGNOSIS — Z09 SURGERY FOLLOW-UP EXAMINATION: Primary | ICD-10-CM

## 2023-06-20 PROCEDURE — 99024 POSTOP FOLLOW-UP VISIT: CPT | Performed by: SURGERY

## 2023-06-20 NOTE — LETTER
June 20, 2023      Aicha Draper  [id number] [address] [address]        To Whom It May Concern:    Aicha Draper is under my care and underwent emergent surgeries on 4/8/2023 and then again on 5/14/2023. These surgery require significant healing time post operatively. She will be able to return to work without restrictions on 7/17/2023. Please call with any questions or concerns.       Sincerely,        Autumn Arteaga MD

## 2023-06-20 NOTE — PROGRESS NOTES
Surgery Postoperative Note    S: Allyson was seen in the ER on 2023 due to abdominal pain and concern possible bowel obstruction.  CT was unremarkable and she was discharged to home. She reports ongoing lower abdominal tenderness. No real nausea. She is having soft, formed bowel movements.     Abdomen: incisions healing well. No hernias. Tender to palpation bilateral lower quadrants     A/P  Aicha Draper is recovering from an an exploratory laparotomy with lysis of adhesions for closed-loop small bowel obstruction on 2023 with Dr. Bhat.  She is also status post laparoscopic abdominal washout with drain placement with myself on 2023 which was following  and complication of significant intra-abdominal abscess which was not controlled with IR drain placement requiring laparoscopic washout.  She also had significant infection of her  incision which required washout debridement and wound VAC placement.  She is here for follow-up after her surgery with Dr. Bhat and is healing well.  There are no areas of concern on her abdomen.      We reviewed her CT from  together. There is a significant amount of formed stool in her colon. We discussed options and I would recommend we start with a bowel prep to see if clearing the colon improves her symptoms. golytely sent to her pharmacy as mag citrate still off the market.     She will let us know how she is doing. I did give her a letter to return to work in mid July.     Thank you for the opportunity to help in her care.    Autumn Arteaga MD  [facility]  [phone]    Please route or send letter to:  Primary Care Provider (PCP) and Referring Provider

## 2023-06-27 ENCOUNTER — MYC MEDICAL ADVICE (OUTPATIENT)
Dept: SURGERY | Facility: CLINIC | Age: 38
End: 2023-06-27
Payer: COMMERCIAL

## 2023-06-27 DIAGNOSIS — R10.84 ABDOMINAL PAIN, GENERALIZED: Primary | ICD-10-CM

## 2023-07-11 ENCOUNTER — HOSPITAL ENCOUNTER (OUTPATIENT)
Dept: CT IMAGING | Facility: CLINIC | Age: 38
Discharge: HOME | End: 2023-07-11
Attending: SURGERY | Admitting: SURGERY
Payer: COMMERCIAL

## 2023-07-11 DIAGNOSIS — R10.84 ABDOMINAL PAIN, GENERALIZED: ICD-10-CM

## 2023-07-11 PROCEDURE — 250N000011 HC RX IP 250 OP 636: Performed by: SURGERY

## 2023-07-11 PROCEDURE — 250N000009 HC RX 250: Performed by: SURGERY

## 2023-07-11 PROCEDURE — 74177 CT ABD & PELVIS W/CONTRAST: CPT

## 2023-07-11 RX ORDER — IOPAMIDOL 755 MG/ML
62 INJECTION, SOLUTION INTRAVASCULAR ONCE
Status: COMPLETED | OUTPATIENT
Start: 2023-07-11 | End: 2023-07-11

## 2023-07-11 RX ADMIN — IOPAMIDOL 62 ML: 755 INJECTION, SOLUTION INTRAVENOUS at 10:00

## 2023-07-11 RX ADMIN — SODIUM CHLORIDE 61 ML: 9 INJECTION, SOLUTION INTRAVENOUS at 10:00

## 2023-07-18 ENCOUNTER — OFFICE VISIT (OUTPATIENT)
Dept: SURGERY | Facility: CLINIC | Age: 38
End: 2023-07-18
Payer: COMMERCIAL

## 2023-07-18 VITALS
SYSTOLIC BLOOD PRESSURE: 118 MMHG | HEART RATE: 78 BPM | OXYGEN SATURATION: 99 % | RESPIRATION RATE: 18 BRPM | BODY MASS INDEX: 21.61 KG/M2 | WEIGHT: 125.9 LBS | DIASTOLIC BLOOD PRESSURE: 64 MMHG

## 2023-07-18 DIAGNOSIS — R10.84 ABDOMINAL PAIN, GENERALIZED: Primary | ICD-10-CM

## 2023-07-18 PROCEDURE — 99024 POSTOP FOLLOW-UP VISIT: CPT | Performed by: SURGERY

## 2023-07-18 NOTE — LETTER
2023    RE:   Aicha Draper 1985    Dear Colleague,    Thank you for referring your patient, Aicha Draper, to [facility] Please see a copy of my visit note below.    Surgery Postoperative Note    S: Allyson reports she has ongoing abdominal pain.  Overall it is mild but she is frustrated that it persists.  She has some intermittent nausea associated with pain.  She is having soft formed daily bowel movements.  She does not feel constipated.  She feels bloated.    Abdomen: Midline abdominal laparotomy scar is well-healed.  Laparoscopic incision sites are well-healed.   incision is healed.  There are no palpable hernias at any sites.  She is tender to palpation across the lower abdomen more so near her  incision.    A/P  Aicha Draper is recovering from an an exploratory laparotomy with lysis of adhesions for closed-loop small bowel obstruction on 2023 with Dr. Bhat.  She is also status post laparoscopic abdominal washout with drain placement with myself on 2023 which was following  and complication of significant intra-abdominal abscess which was not controlled with IR drain placement requiring laparoscopic washout.  She also had significant infection of her  incision which required washout debridement and wound VAC placement.     She had a CT enterography due to ongoing abdominal pain and intermittent obstructive type symptoms which fortunately was benign.     We discussed that at this point I do not see any role for surgical intervention to improve her symptoms.  I would like to have her follow-up both with OB/GYN as well as with GI and we will help her in scheduling these appointments.  She would like to see a new OB/GYN going forward and we can help her with scheduling that.    Thank you for the opportunity to help in her care.    Sincerely,      uAtumn Arteaga MD    [facility] [place]

## 2023-07-18 NOTE — PROGRESS NOTES
Surgery Postoperative Note    S: Allyson reports she has ongoing abdominal pain.  Overall it is mild but she is frustrated that it persists.  She has some intermittent nausea associated with pain.  She is having soft formed daily bowel movements.  She does not feel constipated.  She feels bloated.    Abdomen: Midline abdominal laparotomy scar is well-healed.  Laparoscopic incision sites are well-healed.   incision is healed.  There are no palpable hernias at any sites.  She is tender to palpation across the lower abdomen more so near her  incision.    A/P  Aicha Draper is recovering from an an exploratory laparotomy with lysis of adhesions for closed-loop small bowel obstruction on 2023 with Dr. Bhat.  She is also status post laparoscopic abdominal washout with drain placement with myself on 2023 which was following  and complication of significant intra-abdominal abscess which was not controlled with IR drain placement requiring laparoscopic washout.  She also had significant infection of her  incision which required washout debridement and wound VAC placement.     She had a CT enterography due to ongoing abdominal pain and intermittent obstructive type symptoms which fortunately was benign.     We discussed that at this point I do not see any role for surgical intervention to improve her symptoms.  I would like to have her follow-up both with OB/GYN as well as with GI and we will help her in scheduling these appointments.  She would like to see a new OB/GYN going forward and we can help her with scheduling that.    Thank you for the opportunity to help in her care.    Autumn Arteaga MD  [facility]  [phone]    Please route or send letter to:  Primary Care Provider (PCP) and Referring Provider

## 2023-07-18 NOTE — PATIENT INSTRUCTIONS
Referrals sent to Dr. Za Bingham and Dr. Rachele Manzo.    [facility] and [facility] will reach out to patient directly to schedule appointments.

## 2023-08-02 ENCOUNTER — TRANSFERRED RECORDS (OUTPATIENT)
Dept: HEALTH INFORMATION MANAGEMENT | Facility: CLINIC | Age: 38
End: 2023-08-02
Payer: COMMERCIAL

## 2023-08-24 ENCOUNTER — TRANSFERRED RECORDS (OUTPATIENT)
Dept: HEALTH INFORMATION MANAGEMENT | Facility: CLINIC | Age: 38
End: 2023-08-24
Payer: COMMERCIAL

## 2023-09-20 NOTE — ANESTHESIA PREPROCEDURE EVALUATION
Anesthesia Pre-Procedure Evaluation    Patient: Aicha Draper   MRN: [id number] : 1985        Procedure : Procedure(s):  LAPAROTOMY  possible bowel resection          Past Medical History:   Diagnosis Date     Acute congestive heart failure, unspecified heart failure type (H) 2023     Atypical squamous cells of undetermined significance (ASCUS) on Papanicolaou smear of cervix 2015    ASCUS cyto, repeat pap 1 yr     In vitro fertilization      PCOS (polycystic ovarian syndrome)      PONV (postoperative nausea and vomiting)       Past Surgical History:   Procedure Laterality Date      SECTION N/A 3/5/2023    Procedure:  section;  Surgeon: Guerda Hamm MD;  Location:  L+D     CHOLECYSTECTOMY, LAPOROSCOPIC  2010    Cholecystectomy, Laparoscopic     IR ABSCESS TUBE CHANGE  4/3/2023     LAPAROSCOPY DIAGNOSTIC (GENERAL) N/A 2023    Procedure: Diagnostic laparoscopy, laparoscopic lavage with abdominal drain placement x2  wound vac placement  ;  Surgeon: Autumn Arteaga MD;  Location:  OR     SURGICAL HISTORY OF -       STOMACH SURGERY REMOVE HAIRBALL     ZZC APPENDECTOMY      incidental with above      Allergies   Allergen Reactions     No Known Drug Allergy       Social History     Tobacco Use     Smoking status: Never     Smokeless tobacco: Never   Vaping Use     Vaping status: Not on file   Substance Use Topics     Alcohol use: Not Currently     Comment: rarely      Wt Readings from Last 1 Encounters:   23 59 kg (130 lb)        Anesthesia Evaluation            ROS/MED HX  ENT/Pulmonary:    (-) asthma   Neurologic:  - neg neurologic ROS     Cardiovascular:     (+) -----Previous cardiac testing   Echo: Date: 3/30/23 Results:  Interpretation Summary     1. The left ventricle is normal in structure, function and size. The visual  ejection fraction is estimated at 60%.  2. The right ventricle is normal in structure, function and size.  3. No valve  disease.  4. Normal IVC size, reduced collapse.     No previous echo for comparison.  ______________________________________________________________________________  Stress Test: Date: Results:    ECG Reviewed: Date: Results:    Cath: Date: Results:   (-) PIH   METS/Exercise Tolerance: >4 METS    Hematologic:       Musculoskeletal:       GI/Hepatic:     (+) GERD,     Renal/Genitourinary: Comment: S/p C/S 3/5/23 now with intraabdominal abscess      Endo:       Psychiatric/Substance Use:       Infectious Disease:       Malignancy:       Other:            Physical Exam    Airway        Mallampati: II   TM distance: > 3 FB   Neck ROM: full   Mouth opening: > 3 cm    Respiratory Devices and Support         Dental       (+) Minor Abnormalities - some fillings, tiny chips      Cardiovascular   cardiovascular exam normal          Pulmonary   pulmonary exam normal                OUTSIDE LABS:  CBC:   Lab Results   Component Value Date    WBC 12.6 (H) 05/14/2023    WBC 6.7 04/11/2023    HGB 16.7 (H) 05/14/2023    HGB 8.6 (L) 04/11/2023    HCT 49.7 (H) 05/14/2023    HCT 28.6 (L) 04/11/2023     05/14/2023     (H) 04/11/2023     BMP:   Lab Results   Component Value Date     05/14/2023     04/09/2023    POTASSIUM 4.3 05/14/2023    POTASSIUM 4.2 04/09/2023    CHLORIDE 98 05/14/2023    CHLORIDE 102 04/09/2023    CO2 22 05/14/2023    CO2 25 04/09/2023    BUN 10.0 05/14/2023    BUN 7.6 04/09/2023    CR 0.72 05/14/2023    CR 0.71 04/09/2023     (H) 05/14/2023    GLC 95 04/09/2023     COAGS: No results found for: PTT, INR, FIBR  POC:   Lab Results   Component Value Date    HCG  01/27/2010     Negative   This test provides a presumptive diagnosis of pregnancy or non-pregnancy. A   confirmed pregnancy diagnosis should only be made by a physician after all   clinical and laboratory findings have been evaluated.    HCGS Negative 05/14/2023     HEPATIC:   Lab Results   Component Value Date    ALBUMIN 4.6  05/14/2023    PROTTOTAL 7.8 05/14/2023    ALT 18 05/14/2023    AST 26 05/14/2023    ALKPHOS 120 (H) 05/14/2023    BILITOTAL 0.5 05/14/2023     OTHER:   Lab Results   Component Value Date    DEMETRICE 10.7 (H) 05/14/2023    MAG 2.1 04/09/2023    LIPASE 32 05/14/2023    TSH 3.05 09/19/2011    T4 0.98 10/19/2009    SED 6 09/19/2011       Anesthesia Plan    ASA Status:  2   NPO Status:  NPO Appropriate    Anesthesia Type: General.     - Airway: ETT   Induction: Intravenous, Propofol.   Maintenance: Balanced.        Consents    Anesthesia Plan(s) and associated risks, benefits, and realistic alternatives discussed. Questions answered and patient/representative(s) expressed understanding.    - Discussed:     - Discussed with:  Patient         Postoperative Care    Pain management: IV analgesics.   PONV prophylaxis: Ondansetron (or other 5HT-3), Dexamethasone or Solumedrol, Background Propofol Infusion     Comments:                Ross Becker MD   99

## 2023-09-27 ENCOUNTER — TRANSFERRED RECORDS (OUTPATIENT)
Dept: HEALTH INFORMATION MANAGEMENT | Facility: CLINIC | Age: 38
End: 2023-09-27
Payer: COMMERCIAL

## 2023-10-03 ENCOUNTER — ANCILLARY PROCEDURE (OUTPATIENT)
Dept: GENERAL RADIOLOGY | Facility: CLINIC | Age: 38
End: 2023-10-03
Attending: OBSTETRICS & GYNECOLOGY
Payer: COMMERCIAL

## 2023-10-03 DIAGNOSIS — Z31.41 FERTILITY TESTING: ICD-10-CM

## 2023-10-03 PROCEDURE — 74740 X-RAY FEMALE GENITAL TRACT: CPT

## 2023-12-12 NOTE — PROGRESS NOTES
Assessment and Plan:     1. Type 2 diabetes mellitus with diabetic neuropathy, without long-term current use of insulin ([address])  2. Hyperlipidemia associated with type 2 diabetes mellitus     Most recent A1c was 7.8 % on 6/13/2023. Need to check updated lab work. Watch carbohydrate intake. Continue statin and last LDL was excellent at 36. Routine foot and eye care recommended. Follow-up with vascular due to underlying PAD. - Hemoglobin A1C; Future  - Comprehensive metabolic panel; Future  - Lipid Panel with Direct LDL reflex; Future    3. Essential hypertension    BP stable in the office. Continue anti-HTN regimen. Has no thoughts of quitting smoking. 4. Lumbar radiculopathy    MRI lumbar spine reviewed. Has upcoming spine consult with Dr. Keeley Low. Patient states he would not want surgery most likely. 5. Medicare annual wellness visit, subsequent    6. Encounter for immunization  - influenza vaccine, high-dose, PF 0.7 mL (FLUZONE HIGH-DOSE)       Depression Screening and Follow-up Plan: Patient was screened for depression during today's encounter. They screened negative with a PHQ-2 score of 1. Preventive health issues were discussed with patient, and age appropriate screening tests were ordered as noted in patient's After Visit Summary. Personalized health advice and appropriate referrals for health education or preventive services given if needed, as noted in patient's After Visit Summary. History of Present Illness:     Patient presents for a Medicare Wellness Visit    Tapan Needy presents for follow-up. Chronic issues with back, PAD, neuropathy. Recently saw vascular surgery and neurology for follow-up. Stable for the most part on medications, but will be seeing Dr. Keeley Low for back consult. Had an abnormal MRI lumbar spine back in May 2023.     Patient Care Team:  Subha Plascencia DO as PCP - General (Internal Medicine)  Jess Acevedo MD as Consulting Physician (Neurology)  Diann Ceja MD Surgery Postoperative Note    S: Allyson is doing well. Drain outputs are improving but still around 50ml/day. Buttock drain less purulent. Wound vac doing fine.     Abdomen: right SYMONE drain is serosanguinous, mostly serous. Vac removed. Healthy granulation tissue at base. Wound now measures 6cm x 1.5cm x1cm. New vac placed.       A/P  Aicha Draper is recovering laparoscopic abdominal wash out and drain placement with wound vac placement after  with post op incisional infection and intraabdominal abscesses. She is improving. Will keep drains and have her see me next Tuesday. Wound vac change again next Tuesday.     Thank you for the opportunity to help in her care.    Autumn Arteaga MD  [facility]  [phone]    Please route or send letter to:  Primary Care Provider (PCP) and Referring Provider     (Urology)     Review of Systems:     The pertinent positive and negative findings are as noted in the HPI. Medicare Screening Tests and Risk Assessments:     Cherelle Murphy is here for his Subsequent Wellness visit. Last Medicare Wellness visit information reviewed, patient interviewed and updates made to the record today. Health Risk Assessment:   Patient rates overall health as fair. Patient feels that their physical health rating is same. Patient is satisfied with their life. Eyesight was rated as slightly worse. Hearing was rated as same. Patient feels that their emotional and mental health rating is same. Patients states they are never, rarely angry. Patient states they are always unusually tired/fatigued. Pain experienced in the last 7 days has been none. Patient states that he has experienced no weight loss or gain in last 6 months. Depression Screening:   PHQ-2 Score: 1      Fall Risk Screening: In the past year, patient has experienced: no history of falling in past year      Home Safety:  Patient does not have trouble with stairs inside or outside of their home. Patient has working smoke alarms and has working carbon monoxide detector. Home safety hazards include: none. Nutrition:   Current diet is Regular. Medications:   Patient is currently taking over-the-counter supplements. OTC medications include: see medication list. Patient is able to manage medications. Activities of Daily Living (ADLs)/Instrumental Activities of Daily Living (IADLs):   Walk and transfer into and out of bed and chair?: Yes  Dress and groom yourself?: Yes    Bathe or shower yourself?: Yes    Feed yourself?  Yes  Do your laundry/housekeeping?: No  Manage your money, pay your bills and track your expenses?: Yes  Make your own meals?: No    Do your own shopping?: Yes    ADL comments: Patient has a nurse aid who helps him out with cooking, shopping and cleaning    Previous Hospitalizations:   Any hospitalizations or ED visits within the last 12 months?: No      Advance Care Planning:   Living will: No    Durable POA for healthcare: No    Advanced directive: No    ACP document given: Yes      Cognitive Screening:   Provider or family/friend/caregiver concerned regarding cognition?: No    PREVENTIVE SCREENINGS      Cardiovascular Screening:    General: Screening Not Indicated and History Lipid Disorder      Diabetes Screening:     General: Screening Not Indicated and History Diabetes      Colorectal Cancer Screening:     General: Screening Not Indicated      Prostate Cancer Screening:    General: History Prostate Cancer and Screening Not Indicated      Abdominal Aortic Aneurysm (AAA) Screening:    Risk factors include: tobacco use        General: Screening Not Indicated      Lung Cancer Screening:     General: Screening Not Indicated and History Lung Cancer      Hepatitis C Screening:    General: Screening Current    Screening, Brief Intervention, and Referral to Treatment (SBIRT)    Screening  Typical number of drinks in a day: 0  Typical number of drinks in a week: 0  Interpretation: Low risk drinking behavior. AUDIT-C Screenin) How often did you have a drink containing alcohol in the past year? never  2) How many drinks did you have on a typical day when you were drinking in the past year? 0  3) How often did you have 6 or more drinks on one occasion in the past year? never    AUDIT-C Score: 0  Interpretation: Score 0-3 (male): Negative screen for alcohol misuse    Single Item Drug Screening:  How often have you used an illegal drug (including marijuana) or a prescription medication for non-medical reasons in the past year? never    Single Item Drug Screen Score: 0  Interpretation: Negative screen for possible drug use disorder    Brief Intervention  Alcohol & drug use screenings were reviewed. No concerns regarding substance use disorder identified.      Other Counseling Topics:   Car/seat belt/driving safety, skin self-exam, sunscreen and regular weightbearing exercise. Social Determinants of Health     Tobacco Use: High Risk (12/12/2023)    Patient History     Smoking Tobacco Use: Every Day     Smokeless Tobacco Use: Never     Passive Exposure: Past   Alcohol Use: Not At Risk (12/12/2023)    AUDIT-C     Frequency of Alcohol Consumption: Never     Average Number of Drinks: Patient does not drink     Frequency of Binge Drinking: Never   Financial Resource Strain: Low Risk  (12/12/2023)    Overall Financial Resource Strain (CARDIA)     Difficulty of Paying Living Expenses: Not very hard   Food Insecurity: Not on file   Transportation Needs: No Transportation Needs (12/12/2023)    PRAPARE - Transportation     Lack of Transportation (Medical): No     Lack of Transportation (Non-Medical): No   Physical Activity: Inactive (2/25/2021)    Exercise Vital Sign     Days of Exercise per Week: 0 days     Minutes of Exercise per Session: 0 min   Stress: Stress Concern Present (2/25/2021)    [facility]     Feeling of Stress : Rather much   Social Connections: Not on file   Intimate Partner Violence: Not on file   Depression: Not at risk (12/12/2023)    PHQ-2     PHQ-2 Score: 1   Housing Stability: Not on file   Utilities: Not on file      Physical Exam:     /80 (BP Location: Left arm, Patient Position: Sitting, Cuff Size: Standard)   Pulse 88   Temp (!) 96.8 °F (36 °C) (Tympanic)   Resp 18   Ht 5' 9" (1.753 m)   Wt 68.2 kg (150 lb 6.4 oz)   SpO2 98%   BMI 22.21 kg/m²     Physical Exam  Constitutional:       General: He is not in acute distress. Appearance: He is not ill-appearing. Cardiovascular:      Rate and Rhythm: Normal rate and regular rhythm. Heart sounds: No murmur heard. Pulmonary:      Effort: Pulmonary effort is normal. No respiratory distress. Breath sounds: No wheezing.    Abdominal:      General: Bowel sounds are normal. There is no distension. Tenderness: There is no abdominal tenderness. Musculoskeletal:      Right lower leg: No edema. Left lower leg: No edema. Neurological:      Mental Status: He is alert.           Tim Crabtree DO

## 2024-08-17 ENCOUNTER — HEALTH MAINTENANCE LETTER (OUTPATIENT)
Age: 39
End: 2024-08-17

## 2025-03-18 ENCOUNTER — OFFICE VISIT (OUTPATIENT)
Dept: FAMILY MEDICINE | Facility: CLINIC | Age: 40
End: 2025-03-18
Payer: COMMERCIAL

## 2025-03-18 VITALS
TEMPERATURE: 98.7 F | HEIGHT: 64 IN | RESPIRATION RATE: 16 BRPM | HEART RATE: 89 BPM | OXYGEN SATURATION: 99 % | DIASTOLIC BLOOD PRESSURE: 71 MMHG | BODY MASS INDEX: 22.88 KG/M2 | SYSTOLIC BLOOD PRESSURE: 107 MMHG | WEIGHT: 134 LBS

## 2025-03-18 DIAGNOSIS — Z00.00 ROUTINE GENERAL MEDICAL EXAMINATION AT A HEALTH CARE FACILITY: Primary | ICD-10-CM

## 2025-03-18 DIAGNOSIS — Z12.4 CERVICAL CANCER SCREENING: ICD-10-CM

## 2025-03-18 DIAGNOSIS — R87.610 ATYPICAL SQUAMOUS CELLS OF UNDETERMINED SIGNIFICANCE (ASCUS) ON PAPANICOLAOU SMEAR OF CERVIX: ICD-10-CM

## 2025-03-18 DIAGNOSIS — D64.9 ANEMIA, UNSPECIFIED TYPE: ICD-10-CM

## 2025-03-18 DIAGNOSIS — Z13.6 CARDIOVASCULAR SCREENING; LDL GOAL LESS THAN 160: ICD-10-CM

## 2025-03-18 DIAGNOSIS — K44.9 HIATAL HERNIA: ICD-10-CM

## 2025-03-18 DIAGNOSIS — G89.4 CHRONIC PAIN SYNDROME: ICD-10-CM

## 2025-03-18 DIAGNOSIS — B02.9 HERPES ZOSTER WITHOUT COMPLICATION: ICD-10-CM

## 2025-03-18 DIAGNOSIS — Z01.419 ENCOUNTER FOR GYNECOLOGICAL EXAMINATION WITHOUT ABNORMAL FINDING: ICD-10-CM

## 2025-03-18 DIAGNOSIS — N97.9 FEMALE INFERTILITY: ICD-10-CM

## 2025-03-18 DIAGNOSIS — L71.9 ROSACEA: ICD-10-CM

## 2025-03-18 PROBLEM — L08.9 WOUND INFECTION: Status: RESOLVED | Noted: 2023-03-29 | Resolved: 2025-03-18

## 2025-03-18 PROBLEM — B99.9 INTRA-ABDOMINAL INFECTION: Status: RESOLVED | Noted: 2023-04-08 | Resolved: 2025-03-18

## 2025-03-18 PROBLEM — K56.609 SMALL BOWEL OBSTRUCTION (H): Status: ACTIVE | Noted: 2023-05-14

## 2025-03-18 PROBLEM — T14.8XXA WOUND INFECTION: Status: RESOLVED | Noted: 2023-03-29 | Resolved: 2025-03-18

## 2025-03-18 LAB
ERYTHROCYTE [DISTWIDTH] IN BLOOD BY AUTOMATED COUNT: 12.8 % (ref 10–15)
HCT VFR BLD AUTO: 42.5 % (ref 35–47)
HGB BLD-MCNC: 15 G/DL (ref 11.7–15.7)
MCH RBC QN AUTO: 30.2 PG (ref 26.5–33)
MCHC RBC AUTO-ENTMCNC: 35.3 G/DL (ref 31.5–36.5)
MCV RBC AUTO: 86 FL (ref 78–100)
PLATELET # BLD AUTO: 157 10E3/UL (ref 150–450)
RBC # BLD AUTO: 4.96 10E6/UL (ref 3.8–5.2)
WBC # BLD AUTO: 6.7 10E3/UL (ref 4–11)

## 2025-03-18 PROCEDURE — 1126F AMNT PAIN NOTED NONE PRSNT: CPT | Performed by: FAMILY MEDICINE

## 2025-03-18 PROCEDURE — 3078F DIAST BP <80 MM HG: CPT | Performed by: FAMILY MEDICINE

## 2025-03-18 PROCEDURE — 3074F SYST BP LT 130 MM HG: CPT | Performed by: FAMILY MEDICINE

## 2025-03-18 PROCEDURE — 36415 COLL VENOUS BLD VENIPUNCTURE: CPT | Performed by: FAMILY MEDICINE

## 2025-03-18 PROCEDURE — 80061 LIPID PANEL: CPT | Performed by: FAMILY MEDICINE

## 2025-03-18 PROCEDURE — 85027 COMPLETE CBC AUTOMATED: CPT | Performed by: FAMILY MEDICINE

## 2025-03-18 PROCEDURE — 99395 PREV VISIT EST AGE 18-39: CPT | Performed by: FAMILY MEDICINE

## 2025-03-18 SDOH — HEALTH STABILITY: PHYSICAL HEALTH: ON AVERAGE, HOW MANY DAYS PER WEEK DO YOU ENGAGE IN MODERATE TO STRENUOUS EXERCISE (LIKE A BRISK WALK)?: 3 DAYS

## 2025-03-18 SDOH — HEALTH STABILITY: PHYSICAL HEALTH: ON AVERAGE, HOW MANY MINUTES DO YOU ENGAGE IN EXERCISE AT THIS LEVEL?: 20 MIN

## 2025-03-18 ASSESSMENT — SOCIAL DETERMINANTS OF HEALTH (SDOH): HOW OFTEN DO YOU GET TOGETHER WITH FRIENDS OR RELATIVES?: MORE THAN THREE TIMES A WEEK

## 2025-03-18 ASSESSMENT — PAIN SCALES - GENERAL: PAINLEVEL_OUTOF10: NO PAIN (0)

## 2025-03-18 NOTE — PATIENT INSTRUCTIONS
[facility]  [address] [place] [id number]  Office: [phone]   Fax:    [phone]     Look at the [employer] line of skin care products - created by Dr. Rina Bowling- fragrance-free - for people with sensitive skin.  Some of her products are available at [employer] as well.  [employer].             Patient Education   Preventive Care Advice   This is general advice given by our system to help you stay healthy. However, your care team may have specific advice just for you. Please talk to your care team about your preventive care needs.  Nutrition  Eat 5 or more servings of fruits and vegetables each day.  Try wheat bread, brown rice and whole grain pasta (instead of white bread, rice, and pasta).  Get enough calcium and vitamin D. Check the label on foods and aim for 100% of the RDA (recommended daily allowance).  Lifestyle  Exercise at least 150 minutes each week  (30 minutes a day, 5 days a week).  Do muscle strengthening activities 2 days a week. These help control your weight and prevent disease.  No smoking.  Wear sunscreen to prevent skin cancer.  Have a dental exam and cleaning every 6 months.  Yearly exams  See your health care team every year to talk about:  Any changes in your health.  Any medicines your care team has prescribed.  Preventive care, family planning, and ways to prevent chronic diseases.  Shots (vaccines)   HPV shots (up to age 26), if you've never had them before.  Hepatitis B shots (up to age 59), if you've never had them before.  COVID-19 shot: Get this shot when it's due.  Flu shot: Get a flu shot every year.  Tetanus shot: Get a tetanus shot every 10 years.  Pneumococcal, hepatitis A, and RSV shots: Ask your care team if you need these based on your risk.  Shingles shot (for age 50 and up)  General health tests  Diabetes screening:  Starting at age 35, Get screened for diabetes at least every 3 years.  If you are younger than age 35, ask your care  team if you should be screened for diabetes.  Cholesterol test: At age 39, start having a cholesterol test every 5 years, or more often if advised.  Bone density scan (DEXA): At age 50, ask your care team if you should have this scan for osteoporosis (brittle bones).  Hepatitis C: Get tested at least once in your life.  STIs (sexually transmitted infections)  Before age 24: Ask your care team if you should be screened for STIs.  After age 24: Get screened for STIs if you're at risk. You are at risk for STIs (including HIV) if:  You are sexually active with more than one person.  You don't use condoms every time.  You or a partner was diagnosed with a sexually transmitted infection.  If you are at risk for HIV, ask about PrEP medicine to prevent HIV.  Get tested for HIV at least once in your life, whether you are at risk for HIV or not.  Cancer screening tests  Cervical cancer screening: If you have a cervix, begin getting regular cervical cancer screening tests starting at age 21.  Breast cancer scan (mammogram): If you've ever had breasts, begin having regular mammograms starting at age 40. This is a scan to check for breast cancer.  Colon cancer screening: It is important to start screening for colon cancer at age 45.  Have a colonoscopy test every 10 years (or more often if you're at risk) Or, ask your provider about stool tests like a FIT test every year or Cologuard test every 3 years.  To learn more about your testing options, visit:   .  For help making a decision, visit:   https://bit.ly/ck09841.  Prostate cancer screening test: If you have a prostate, ask your care team if a prostate cancer screening test (PSA) at age 55 is right for you.  Lung cancer screening: If you are a current or former smoker ages 50 to 80, ask your care team if ongoing lung cancer screenings are right for you.  For informational purposes only. Not to replace the advice of your health care provider. Copyright   2023 [facility]  "Services. All rights reserved. Clinically reviewed by the [facility]. [employer] 928802 - REV 01/24.  Learning About Being Physically Active  What is physical activity?     Being physically active means doing any kind of activity that gets your body moving.  The types of physical activity that can help you get fit and stay healthy include:  Aerobic or \"cardio\" activities. These make your heart beat faster and make you breathe harder, such as brisk walking, riding a bike, or running. They strengthen your heart and lungs and build up your endurance.  Strength training activities. These make your muscles work against, or \"resist,\" something. Examples include lifting weights or doing push-ups. These activities help tone and strengthen your muscles and bones.  Stretches. These let you move your joints and muscles through their full range of motion. Stretching helps you be more flexible.  Reaching a balance between these three types of physical activity is important because each one contributes to your overall fitness.  What are the benefits of being active?  Being active is one of the best things you can do for your health. It helps you to:  Feel stronger and have more energy to do all the things you like to do.  Focus better at school or work.  Feel, think, and sleep better.  Reach and stay at a healthy weight.  Lose fat and build lean muscle.  Lower your risk for serious health problems, including diabetes, heart attack, high blood pressure, and some cancers.  Keep your heart, lungs, bones, muscles, and joints strong and healthy.  How can you make being active part of your life?  Start slowly. Make it your long-term goal to get at least 30 minutes of exercise on most days of the week. Walking is a good choice. You also may want to do other activities, such as running, swimming, cycling, or playing tennis or team sports.  Pick activities that you like--ones that make your heart beat faster, " "your muscles stronger, and your muscles and joints more flexible. If you find more than one thing you like doing, do them all. You don't have to do the same thing every day.  Get your heart pumping every day. Any activity that makes your heart beat faster and keeps it at that rate for a while counts.  Here are some great ways to get your heart beating faster:  Go for a brisk walk, run, or hike.  Go for a swim or bike ride.  Take an online exercise class or dance.  Play a game of touch football, basketball, or soccer.  Play tennis, pickleball, or racquetball.  Climb stairs.  Even some household chores can be aerobic. Just do them at a faster pace. Raking or mowing the lawn, sweeping the garage, and vacuuming and cleaning your home all can help get your heart rate up.  Strengthen your muscles during the week. You don't have to lift heavy weights or grow big, bulky muscles to get stronger. Doing a few simple activities that make your muscles work against, or \"resist,\" something can help you get stronger. Aim for at least twice a week.  For example, you can:  Do push-ups or sit-ups, which use your own body weight as resistance.  Lift weights or dumbbells or use stretch bands at home or in a gym or community center.  Stretch your muscles often. Stretching will help you as you become more active. It can help you stay flexible and loosen tight muscles. It can also help improve your balance and posture and can be a great way to relax.  Be sure to stretch the muscles you'll be using when you work out. It's best to warm your muscles slightly before you stretch them. Walk or do some other light aerobic activity for a few minutes. Then start stretching.  When you stretch your muscles:  Do it slowly. Stretching is not about going fast or making sudden movements.  Don't push or bounce during a stretch.  Hold each stretch for at least 15 to 30 seconds, if you can. You should feel a stretch in the muscle, but not pain.  Breathe out " "as you do the stretch. Then breathe in as you hold the stretch. Don't hold your breath.  If you're worried about how more activity might affect your health, have a checkup before you start. Follow any special advice your doctor gives you for getting a smart start.  Where can you learn more?  Go to https://www.[employer].net/patiented  Enter W332 in the search box to learn more about \"Learning About Being Physically Active.\"  Current as of: July 31, 2024  Content Version: 14.4    4440-7571 [employer].   Care instructions adapted under license by your healthcare professional. If you have questions about a medical condition or this instruction, always ask your healthcare professional. [employer] disclaims any warranty or liability for your use of this information.    Eating Healthy Foods: Care Instructions  With every meal, you can make healthy food choices. Try to eat a variety of fruits, vegetables, whole grains, lean proteins, and low-fat dairy products. This can help you get the right balance of nutrients, including vitamins and minerals. Small changes add up over time. You can start by adding one healthy food to your meals each day.    Try to make half your plate fruits and vegetables, one-fourth whole grains, and one-fourth lean proteins. Try including dairy with your meals.   Eat more fruits and vegetables. Try to have them with most meals and snacks.   Foods for healthy eating        Fruits   These can be fresh, frozen, canned, or dried.  Try to choose whole fruit rather than fruit juice.  Eat a variety of colors.        Vegetables   These can be fresh, frozen, canned, or dried.  Beans, peas, and lentils count too.        Whole grains   Choose whole-grain breads, cereals, and noodles.  Try brown rice.        Lean proteins   These can include lean meat, poultry, fish, and eggs.  You can also have tofu, beans, peas, lentils, nuts, and seeds.        Dairy   Try milk, yogurt, and " "cheese.  Choose low-fat or fat-free when you can.  If you need to, use lactose-free milk or fortified plant-based milk products, such as soy milk.        Water   Drink water when you're thirsty.  Limit sugar-sweetened drinks, including soda, fruit drinks, and sports drinks.  Where can you learn more?  Go to https://www.[employer].net/patiented  Enter T756 in the search box to learn more about \"Eating Healthy Foods: Care Instructions.\"  Current as of: October 7, 2024  Content Version: 14.4    9235-4161 [employer].   Care instructions adapted under license by your healthcare professional. If you have questions about a medical condition or this instruction, always ask your healthcare professional. [employer] disclaims any warranty or liability for your use of this information.    Thank you so much or choosing [facility]  for your Health Care. It was a pleasure seeing you at your visit today! Please contact us with any questions or concerns you may have.                   Aicha Kingston MD                              To reach your [facility] care team after hours call:   [phone] press #2 \"to speak with your care team\".  This will get you to our clinic instead of routing to central [facility]  scheduling.     PLEASE NOTE OUR HOURS HAVE CHANGED secondary to COVID-19 coronavirus pandemic, as we are trying to minimize patient exposure to the virus,  which is now widespread in the [facility].  These hours may change with very little notice.  We apologize for any inconvenience.       Our current clinic hours are:          Monday- Thursday   7:00am - 6:00pm  in person.      Friday  7:00am- 5:00pm                       Saturday and Sunday : Closed to in person and virtual visits        We have telephone and virtual visit times available between    7:00am - 6pm on Monday-Friday as well.                                                " Phone:  [phone]      Our pharmacy hours: Monday through Friday 8:00am to 5:00pm                        Saturday - 9:00 am to 12 noon       Sunday : Closed.              Phone:  [phone]              ###  Please note: at this time we are not accepting any walk-in visits. ###      There is also information available at our web site:  [email or web address]    If your provider ordered any lab tests and you do not receive the results within 10 business days, please call the clinic.    If you need a medication refill please contact your pharmacy.  Please allow 3 business days for your refill to be completed.    Our clinic offers telephone visits and e visits.  Please ask one of your team members to explain more.      Use [employer] (secure email communication and access to your chart) to send your primary care provider a message or make an appointment. Ask someone on your Team how to sign up for [employer].

## 2025-03-18 NOTE — Clinical Note
Please abstract the following data from this visit with this patient into the appropriate field in Epic:  Tests that can be patient reported without a hard copy:  Pap smear done on this date: 12/2022 (approximately), by this group: [facility] , results were normal .   Other Tests found in the patient's chart through Chart Review/Care Everywhere:    Note to [employer]: If this section is blank, no results were found via Chart Review/Care Everywhere.

## 2025-03-18 NOTE — PROGRESS NOTES
Preventive Care Visit  [facility] [place]  Aichaaiyana Kingston MD, Family Medicine  Mar 18, 2025      Assessment & Plan       ICD-10-CM    1. Routine general medical examination at a health care facility  Z00.00       2. Cervical cancer screening- to be done at ob/gyn's - [facility] [place]  Z12.4       3. Anemia, unspecified type- s/p  and subsequent infections - s/p 1 RBC transfusion and then 4 iron infusions  D64.9       4. Female infertility- did IVF for first pregnancy - now signif. tubal scarring due to intrapelvic infection & abscess  s/p  3/2024  N97.9       5. Herpes zoster without complication- 2/15/2025 -  left lateral abdomen and left low back - treated with valcyclovir & tylenol  B02.9       6. Encounter for gynecological examination without abnormal finding - sees Dr. Za Bingham - [facility] [place]- next appt 2025  Z01.419       7. Hiatal hernia  K44.9       8. Chronic pain syndrome- Sees Cierra Donnelly for acupuncture - Integrated Medicine - has her doctorate  G89.4       9. CARDIOVASCULAR SCREENING; LDL GOAL LESS THAN 160  Z13.6       10. Rosacea  L71.9             {Patient advised of split billing (Optional):539711}        Counseling  Appropriate preventive services were addressed with this patient via screening, questionnaire, or discussion as appropriate for fall prevention, nutrition, physical activity, Tobacco-use cessation, social engagement, weight loss and cognition.  Checklist reviewing preventive services available has been given to the patient.  Reviewed patient's diet, addressing concerns and/or questions.   She is at risk for lack of exercise and has been provided with information to increase physical activity for the benefit of her well-being.       {FOLLOW UP PLANS (Optional) Includes COVID19 Treatment Plan:047097}    Marie Valadez is a 39 year old, presenting for the following:  Physical (Here today for her Routine Medical Exam. Requesting to  have a Biometric form completed for her work. )        3/18/2025    10:24 AM   Additional Questions   Roomed by Adriana Hodge CMA   Accompanied by Self        Via the Health Maintenance questionnaire, the patient has reported the following services have been completed -Cervical Cancer Screening: ogi 2023-05-04, this information has been sent to the abstraction team.    HPI     {SUPERLIST (Optional):993516}  {additonal problems for provider to add (Optional):864216}  Advance Care Planning  Patient does not have a Health Care Directive: Discussed advance care planning with patient; however, patient declined at this time.      3/18/2025   General Health   How would you rate your overall physical health? Good   Feel stress (tense, anxious, or unable to sleep) Not at all         3/18/2025   Nutrition   Three or more servings of calcium each day? Yes   Diet: Regular (no restrictions)   How many servings of fruit and vegetables per day? (!) 2-3   How many sweetened beverages each day? 0-1         3/18/2025   Exercise   Days per week of moderate/strenous exercise 3 days   Average minutes spent exercising at this level 20 min         3/18/2025   Social Factors   Frequency of gathering with friends or relatives More than three times a week   Worry food won't last until get money to buy more No   Food not last or not have enough money for food? No   Do you have housing? (Housing is defined as stable permanent housing and does not include staying ouside in a car, in a tent, in an abandoned building, in an overnight shelter, or couch-surfing.) Yes   Are you worried about losing your housing? No   Lack of transportation? No   Unable to get utilities (heat,electricity)? No         3/18/2025   Dental   Dentist two times every year? Yes           Today's PHQ-2 Score:       3/18/2025    10:25 AM   PHQ-2 ( 1999 [employer])   Q1: Little interest or pleasure in doing things 0   Q2: Feeling down, depressed or hopeless 0   PHQ-2 Score 0    Q1:  "Little interest or pleasure in doing things Not at all   Q2: Feeling down, depressed or hopeless Not at all   PHQ-2 Score 0       Patient-reported           3/18/2025   Substance Use   Alcohol more than 3/day or more than 7/wk No   Do you use any other substances recreationally? No     Social History     Tobacco Use    Smoking status: Never    Smokeless tobacco: Never   Vaping Use    Vaping status: Never Used   Substance Use Topics    Alcohol use: Not Currently     Comment: rarely    Drug use: No     {Provider  If there are gaps in the social history shown above, please follow the link to update and then refresh the note Link to Social and Substance History :321181}     {Mammogram Decision Support (Optional):717809}        3/18/2025   STI Screening   New sexual partner(s) since last STI/HIV test? No     History of abnormal Pap smear: { :804952}        12/15/2016    12:00 AM 12/3/2015    12:00 AM 11/21/2012     1:38 PM   PAP / HPV   PAP (Historical) NIL  ASC-US  NIL            3/18/2025   Contraception/Family Planning   Questions about contraception or family planning No     {Provider  REQUIRED FOR AWV Use the storyboard to review patient history, after sections have been marked as reviewed, refresh note to capture documentation:697511}   Reviewed and updated as needed this visit by Provider                    {HISTORY OPTIONS (Optional):579192}    {ROS Picklists (Optional):632493}     Objective    Exam  /71 (BP Location: Right arm, Patient Position: Sitting, Cuff Size: Adult Regular)   Pulse 89   Temp 98.7  F (37.1  C) (Oral)   Resp 16   Ht 1.62 m (5' 3.78\")   Wt 60.8 kg (134 lb)   LMP 02/17/2025   SpO2 99%   BMI 23.16 kg/m     Estimated body mass index is 23.16 kg/m  as calculated from the following:    Height as of this encounter: 1.62 m (5' 3.78\").    Weight as of this encounter: 60.8 kg (134 lb).    Physical Exam  {Exam Choices (Optional):698468}        Signed Electronically by: Aicha GUERRA" MD Thee  {Email feedback regarding this note to [email or web address]   :065569}   underwent IVF for the first pregnancy and due to post-infection complications, natural conception is unlikely. She does not currently use birth control.    Family History  Her great-grandmother had breast cancer, though details are limited. There is no known family history of colon or ovarian cancer.    Lifestyle  She works from home as a . She exercises regularly using a home Pilates machine and a rowing machine. She enjoys outdoor walks, especially with her child. She does not smoke, use tobacco, or consume alcohol or recreational drugs. She engages in acupuncture for chronic pain and migraine management.      Advance Care Planning  Patient does not have a Health Care Directive: Discussed advance care planning with patient; however, patient declined at this time.      3/18/2025   General Health   How would you rate your overall physical health? Good   Feel stress (tense, anxious, or unable to sleep) Not at all         3/18/2025   Nutrition   Three or more servings of calcium each day? Yes   Diet: Regular (no restrictions)   How many servings of fruit and vegetables per day? (!) 2-3   How many sweetened beverages each day? 0-1         3/18/2025   Exercise   Days per week of moderate/strenous exercise 3 days   Average minutes spent exercising at this level 20 min         3/18/2025   Social Factors   Frequency of gathering with friends or relatives More than three times a week   Worry food won't last until get money to buy more No   Food not last or not have enough money for food? No   Do you have housing? (Housing is defined as stable permanent housing and does not include staying ouside in a car, in a tent, in an abandoned building, in an overnight shelter, or couch-surfing.) Yes   Are you worried about losing your housing? No   Lack of transportation? No   Unable to get utilities (heat,electricity)? No         3/18/2025   Dental   Dentist two times every year? Yes           Today's PHQ-2  Score:       3/18/2025    10:25 AM   PHQ-2 (  [employer])   Q1: Little interest or pleasure in doing things 0   Q2: Feeling down, depressed or hopeless 0   PHQ-2 Score 0    Q1: Little interest or pleasure in doing things Not at all   Q2: Feeling down, depressed or hopeless Not at all   PHQ-2 Score 0       Patient-reported           3/18/2025   Substance Use   Alcohol more than 3/day or more than 7/wk No   Do you use any other substances recreationally? No     Social History     Tobacco Use    Smoking status: Never    Smokeless tobacco: Never   Vaping Use    Vaping status: Never Used   Substance Use Topics    Alcohol use: Not Currently     Comment: rarely    Drug use: No          Mammogram Screening - Patient under 40 years of age: Routine Mammogram Screening not recommended.         3/18/2025   STI Screening   New sexual partner(s) since last STI/HIV test? No     History of abnormal Pap smear: YES - reflected in Problem List and Health Maintenance accordingly        12/15/2016    12:00 AM 12/3/2015    12:00 AM 2012     1:38 PM   PAP / HPV   PAP (Historical) NIL  ASC-US  NIL            3/18/2025   Contraception/Family Planning   Questions about contraception or family planning No        Reviewed and updated as needed this visit by Provider                    Past Medical History:   Diagnosis Date    Acute congestive heart failure, unspecified heart failure type (H) 2023    Atypical squamous cells of undetermined significance (ASCUS) on Papanicolaou smear of cervix 2015    ASCUS cyto, repeat pap 1 yr    Failed vacuum extraction delivery 2023    Hypokalemia 2023    In vitro fertilization     Intra-abdominal infection 2023    Labor and delivery indication for care or intervention 2023    PCOS (polycystic ovarian syndrome)     PONV (postoperative nausea and vomiting)     Wound infection 2023     Past Surgical History:   Procedure Laterality Date     SECTION N/A  2023    Procedure:  section;  Surgeon: Guerda Hamm MD;  Location:  L+D    CHOLECYSTECTOMY, LAPOROSCOPIC  2010    Cholecystectomy, Laparoscopic    IR ABSCESS TUBE CHANGE  2023    LAPAROSCOPY DIAGNOSTIC (GENERAL) N/A 2023    Procedure: Diagnostic laparoscopy, laparoscopic lavage with abdominal drain placement x2  wound vac placement  ;  Surgeon: Autumn Arteaga MD;  Location:  OR    LAPAROTOMY EXPLORATORY N/A 2023    Procedure: EXPLORATORY LAPAROTOMY; LYSIS OF ADHESIONS;  Surgeon: Brandon Bhat MD;  Location:  OR    SEPTOPLASTY N/A     for deviated septum - age 21    SURGICAL HISTORY OF -       STOMACH SURGERY REMOVE HAIRBALL    ZZC APPENDECTOMY      incidental with above     OB History    Para Term  AB Living   1 1 1 0 0 1   SAB IAB Ectopic Multiple Live Births   0 0 0 0 1      # Outcome Date GA Lbr Raul/2nd Weight Sex Type Anes PTL Lv   1 Term 25 40w3d 16:10 / 02:22 3.657 kg (8 lb 1 oz) M -SEC EPI N MARK      Birth Comments: CS for failure of descent      Complications: Cephalopelvic Disproportion      Name: Ezequiel Draper      Apgar1: 8  Apgar5: 9     Lab work is in process  Labs reviewed in EPIC  BP Readings from Last 3 Encounters:   25 107/71   23 118/64   23 106/67    Wt Readings from Last 3 Encounters:   25 60.8 kg (134 lb)   23 57.1 kg (125 lb 14.4 oz)   23 57.1 kg (125 lb 12.8 oz)                  Patient Active Problem List   Diagnosis    CARDIOVASCULAR SCREENING; LDL GOAL LESS THAN 160    Insomnia- acupuncture and herbal meds from Acupunturist    Intractable menstrual migraine without status migrainosus- with occasional aura - acupuncture helps a lot    Atypical squamous cells of undetermined significance (ASCUS) on Papanicolaou smear of cervix - in  - normal paps since then    Ganglion cyst of wrist, left    Weakness of both hips    S/P  section    Anemia due  to blood loss, acute    S/P     Anemia, unspecified type    Pelvic abscess in female    Small bowel obstruction (H)- resolved 2023 with surgery - lysis of adhesions    Female infertility- did IVF for first pregnancy - now signif. tubal scarring due to intrapelvic infection & abscess  s/p  3/2024    Chronic pain syndrome- Sees Cierra Donnelly for acupuncture - Integrated Medicine - has her doctorate    Hiatal hernia    Encounter for gynecological examination without abnormal finding - sees Dr. Za Bingham - [id number] in [place]- next appt 2025    Herpes zoster without complication- 2/15/2025 -  left lateral abdomen and left low back - treated with valcyclovir & tylenol     Past Surgical History:   Procedure Laterality Date     SECTION N/A 2023    Procedure:  section;  Surgeon: Guerda Hamm MD;  Location:  L[facility]D    CHOLECYSTECTOMY, LAPOROSCOPIC  2010    Cholecystectomy, Laparoscopic    IR ABSCESS TUBE CHANGE  2023    LAPAROSCOPY DIAGNOSTIC (GENERAL) N/A 2023    Procedure: Diagnostic laparoscopy, laparoscopic lavage with abdominal drain placement x2  wound vac placement  ;  Surgeon: Autumn Arteaga MD;  Location:  OR    LAPAROTOMY EXPLORATORY N/A 2023    Procedure: EXPLORATORY LAPAROTOMY; LYSIS OF ADHESIONS;  Surgeon: Brandon Bhat MD;  Location:  OR    SEPTOPLASTY N/A     for deviated septum - age 21    SURGICAL HISTORY OF -       STOMACH SURGERY REMOVE HAIRBALL    ZZC APPENDECTOMY      incidental with above       Social History     Tobacco Use    Smoking status: Never    Smokeless tobacco: Never   Substance Use Topics    Alcohol use: Not Currently     Comment: rarely     Family History   Problem Relation Age of Onset    Hypertension Mother     Hyperlipidemia Mother     Arthritis Father         onset 23- 24 yrs    C.A.D. Maternal Grandmother     C.A.D. Maternal Grandfather     Prostate Cancer Maternal Grandfather      "Diabetes Paternal Grandmother     Arthritis Paternal Grandmother     EDELMIRA. Paternal Grandfather     Arthritis Paternal Grandfather     Breast Cancer Other     Colon Cancer No family hx of          Current Outpatient Medications   Medication Sig Dispense Refill    calcium carbonate (TUMS) 500 MG chewable tablet Take 2 chew tab by mouth 4 times daily as needed for heartburn      fish oil-omega-3 fatty acids 1000 MG capsule Take 2 g by mouth every evening      ibuprofen (ADVIL/MOTRIN) 200 MG tablet Take 2 tablets (400 mg) by mouth every 6 hours as needed for pain      omeprazole (PRILOSEC) 20 MG DR capsule Take 20 mg by mouth every evening       Allergies   Allergen Reactions    No Known Drug Allergy      Recent Labs   Lab Test 03/18/25  1159 06/11/23  2203 05/16/23  0741 05/15/23  0800 05/14/23  1539 03/04/23  2106 02/26/20  0934   *  --   --   --   --   --  96   HDL 92  --   --   --   --   --  64   TRIG 73  --   --   --   --   --  48   ALT  --  14  --  13 18   < > 15   CR  --  0.69 0.67 0.69 0.72   < > 0.79   GFRESTIMATED  --  >90 >90 >90 >90   < > >90   GFRESTBLACK  --   --   --   --   --   --  >90   POTASSIUM  --  4.2 4.1 4.4 4.3   < > 4.3    < > = values in this interval not displayed.      Results  - Echocardiogram (March 29, 2023): Normal left ventricle size, structure, function; ejection fraction 60%; normal right ventricle size, structure, function; no valvular disease.  - Hemoglobin: 6.6.  - Iron infusions: Four.        Review of Systems  Constitutional, HEENT, cardiovascular, pulmonary, GI, , musculoskeletal, neuro, skin, endocrine and psych systems are negative, except as otherwise noted.     Objective    Exam  /71 (BP Location: Right arm, Patient Position: Sitting, Cuff Size: Adult Regular)   Pulse 89   Temp 98.7  F (37.1  C) (Oral)   Resp 16   Ht 1.62 m (5' 3.78\")   Wt 60.8 kg (134 lb)   LMP 02/17/2025   SpO2 99%   BMI 23.16 kg/m     Estimated body mass index is 23.16 kg/m  as " "calculated from the following:    Height as of this encounter: 1.62 m (5' 3.78\").    Weight as of this encounter: 60.8 kg (134 lb).    Physical Exam:   GENERAL: alert and no distress  EYES: Eyes grossly normal to inspection, PERRL and conjunctivae and sclerae normal  HENT: ear canals and TM's normal, nose and mouth without ulcers or lesions  NECK: no adenopathy, no asymmetry, masses, or scars  RESP: lungs clear to auscultation - no rales, rhonchi or wheezes  BREAST: normal without masses, tenderness or nipple discharge and no palpable axillary masses or adenopathy  CV: regular rate and rhythm, normal S1 S2, no S3 or S4, no murmur, click or rub, no peripheral edema  ABDOMEN: soft, nontender, no hepatosplenomegaly, no masses and bowel sounds normal   (female) w/bimanual: normal female external genitalia, normal urethral meatus, normal vaginal mucosa, and normal cervix/adnexa/uterus without masses or discharge  MS: no gross musculoskeletal defects noted, no edema  SKIN: no suspicious lesions or rashes  NEURO: Normal strength and tone, mentation intact and speech normal  PSYCH: mentation appears normal, affect normal/bright      Results  - Echocardiogram (March 29, 2023): Normal left ventricle size, structure, function; ejection fraction 60%; normal right ventricle size, structure, function; no valvular disease.  - Hemoglobin: 6.6.  - Iron infusions: Four.  Signed Electronically by: Aicha Kingston MD  "

## 2025-03-18 NOTE — LETTER
My Migraine Action Plan      Date: 3/18/2025     My Name: Aicha Draper   YOB: 1985  My Pharmacy: [id number] - [place], [place] - [address]       My (Preventative) Control Medicine: none currently.     Try coenzyme Q-10 - 100-200mg daily,  Magnesium oxide 400mg daily , fish oil 2000mg twice daily ( if you get fishy burps - keep your fish oil capsules in the freezer),  Feverfew supplement 120-180mg daily to help prevent chronic daily headaches and/or migraine headaches.            My Rescue Medicine: For start of headaches - try 1 excedrin tension headache ( tylenol (acetaminophen) with caffeine with 3-4 Ibuprofen with 1 can of CocaCola.     My Doctor: Aicha Kingston     My Clinic: [address] [address] [phone]        GREEN ZONE = Good Control    My headache plan is working.   I can do what I need to do.           I WILL:     ? Keep managing my triggers.  ? Write in my migraine diary each time I have a headache.  ? Keep taking my preventive (controller) medicine daily.  ? Take my relief and rescue medicine as needed.             YELLOW ZONE = Not Enough Control    My headache plan isn t always working.   My headaches keep me from doing   some of the things I need to do.       I WILL:     ? Set goals to control my triggers and act on them.  ? Write in my migraine diary each time I have a headache and review it for                      patterns or new triggers.  ? Keep taking my preventive (controller) medicine daily.  ? Take my relief and rescue medicine as needed.  ? Call my doctor or clinic at if I stay in the Yellow Zone.             RED ZONE = Poor or No Control    My headache plan has  failed. I can t do anything  when I have one. My  medicines aren t working.           I WILL:   ? Set goals to control my triggers and act on them.  ? Write in my migraine diary each time I have a headache and  review it for                      patterns or new triggers.  ? Keep taking my preventive (controller) medicine daily.  ? Take my relief and rescue medicine as needed.  ? Call my doctor or clinic or go to urgent care or an ER if I m having the worst                  headache of my life.  ? Call my doctor or clinic or go to urgent care or an ER if my medicine doesn t work.  ? Let my doctor or clinic know within 2 weeks if I have gone to an urgent care or             emergency department.          Provider specific instructions:  none.

## 2025-03-19 LAB
CHOLEST SERPL-MCNC: 233 MG/DL
FASTING STATUS PATIENT QL REPORTED: NO
HDLC SERPL-MCNC: 92 MG/DL
LDLC SERPL CALC-MCNC: 126 MG/DL
NONHDLC SERPL-MCNC: 141 MG/DL
TRIGL SERPL-MCNC: 73 MG/DL

## 2025-06-26 NOTE — PROVIDER NOTIFICATION
03/04/23 2041   Provider Notification   Provider Name/Title dr dawson   Method of Notification Electronic Page   Request Evaluate - Remote   Notification Reason SVE;Maternal Vital Sign Change   Dr Hamm updated on SVE 5/90/0 and BPs 140-150s/70-80s.  PreE labs ordered and augmentation pitocin ordered.     
   03/05/23 0455   Provider Notification   Provider Name/Title Dr Hamm   Method of Notification Phone   Notification Reason Decels;Fetal Baseline Change;Uterine Activity;SVE;Status Update     Dr Hamm updated on pt being an anterior lip for 2 hours; pitocin at 16mu/min; late decels for about 45 min; contraction pattern q2-3; MVUs.  
   03/06/23 0930   Provider Notification   Provider Name/Title Dr. Cordova   Method of Notification Electronic Page   Request Evaluate-Remote   Notification Reason Lab Results     Dr. Cordova notified of HGB of 8.1. Order received for oral Iron daily.   
Continue Regimen: adapalene 0.3% gel qhs, as tolerated. Pt was given refills today
Render In Strict Bullet Format?: No
Detail Level: Generalized
Continue Regimen: spironolactone 25 mg tablet  Take one tablet daily. Pt given refills today

## (undated) DEVICE — ESU GROUND PAD UNIVERSAL W/O CORD

## (undated) DEVICE — SU SILK 2-0 FSL 18" 677G

## (undated) DEVICE — SUCTION CANISTER MEDIVAC LINER 3000ML W/LID 65651-530

## (undated) DEVICE — SUCTION IRR STRYKERFLOW II W/TIP 250-070-520

## (undated) DEVICE — PREP CHLORAPREP 26ML TINTED ORANGE  260815

## (undated) DEVICE — SU VICRYL 2-0 TIE 12X18" J905T

## (undated) DEVICE — GLOVE BIOGEL PI MICRO SZ 7.5 48575

## (undated) DEVICE — BLADE CLIPPER 4406

## (undated) DEVICE — GLOVE PROTEXIS W/NEU-THERA 7.0  2D73TE70

## (undated) DEVICE — SOL NACL 0.9% IRRIG 1000ML BOTTLE 07138-09

## (undated) DEVICE — LINEN TOWEL PACK X5 5464

## (undated) DEVICE — ESU LIGASURE MARYLAND LAPAROSCOPIC SLR/DVDR 5MMX37CM LF1937

## (undated) DEVICE — ESU ELEC BLADE 6" COATED E1450-6

## (undated) DEVICE — PACK MAJOR SBA15MAFSI

## (undated) DEVICE — DRAIN JACKSON PRATT 19FR ROUND SU130-1325

## (undated) DEVICE — SU VICRYL 0 UR-6 27" J603H

## (undated) DEVICE — Device

## (undated) DEVICE — DRAPE LAP W/ARMBOARD 29410

## (undated) DEVICE — DEVICE SUTURE PASSER 14GA WECK EFX EFXSP2

## (undated) DEVICE — PREP CHLORAPREP 26ML TINTED HI-LITE ORANGE 930815

## (undated) DEVICE — LINEN C-SECTION 5415

## (undated) DEVICE — CANISTER WOUND VAC W/GEL 1000ML M8275093/5

## (undated) DEVICE — SU PROLENE 2-0 SHDA 48" 8533H

## (undated) DEVICE — SU MONOCRYL 4-0 PS-2 18" UND Y496G

## (undated) DEVICE — PACK C-SECTION LF PL15OTA83B

## (undated) DEVICE — ENDO TROCAR SLEEVE KII Z-THREADED 05X100MM CTS02

## (undated) DEVICE — DRSG WOUND VAC WHITEFOAM M6275034/10

## (undated) DEVICE — SOL NACL 0.9% INJ 1000ML BAG 2B1324X

## (undated) DEVICE — SU VICRYL 3-0 SH 27" J316H

## (undated) DEVICE — SURGICEL POWDER ABSORBABLE HEMOSTAT 3GM 3013SP

## (undated) DEVICE — DRAIN JACKSON PRATT RESERVOIR 100ML SU130-1305

## (undated) DEVICE — DRAIN JACKSON PRATT 15FR ROUND SU130-1323

## (undated) DEVICE — SOL WATER IRRIG 1000ML BOTTLE 2F7114

## (undated) DEVICE — SU PDS II 2-0 CT-2 27"  Z333H

## (undated) DEVICE — SU VICRYL 0 CT 36" J358H

## (undated) DEVICE — DRAPE IOBAN INCISE 23X17" 6650EZ

## (undated) DEVICE — SU VICRYL 3-0 TIE 12X18" J904T

## (undated) DEVICE — GLOVE BIOGEL PI MICRO INDICATOR UNDERGLOVE SZ 7.5 48975

## (undated) DEVICE — ESU HOLDER LAP INST DISP PURPLE LONG 330MM H-PRO-330

## (undated) DEVICE — SUCTION TIP POOLE K770

## (undated) DEVICE — ENDO TROCAR FIRST ENTRY KII FIOS Z-THRD 05X100MM CTF03

## (undated) DEVICE — GLOVE BIOGEL PI MICRO SZ 6.0 48560

## (undated) DEVICE — SU VICRYL 0 CT-1 27" J340H

## (undated) DEVICE — PACK LAP CHOLE SLC15LCFSD

## (undated) DEVICE — GLOVE PROTEXIS BLUE W/NEU-THERA 6.5  2D73EB65

## (undated) DEVICE — STPL SKIN PROXIMATE 35 WIDE PMW35

## (undated) DEVICE — DRSG GAUZE 4X4" 3033

## (undated) DEVICE — BLADE KNIFE SURG 15 371115

## (undated) DEVICE — GLOVE BIOGEL PI MICRO INDICATOR UNDERGLOVE SZ 6.5 48965

## (undated) DEVICE — COVER CLAMP FABRIC RADIOPAQUE 9.5X150MM 072002PBX

## (undated) DEVICE — ENDO SCOPE WARMER LF TM500

## (undated) DEVICE — NDL 22GA 1.5"

## (undated) DEVICE — BARRIER SEPRAFILM 5X6" SINGLE SHEET 4301-02

## (undated) DEVICE — CATH TRAY FOLEY 16FR BARDEX W/DRAIN BAG STATLOCK 300316A

## (undated) RX ORDER — FENTANYL CITRATE 0.05 MG/ML
INJECTION, SOLUTION INTRAMUSCULAR; INTRAVENOUS
Status: DISPENSED
Start: 2023-04-08

## (undated) RX ORDER — PROPOFOL 10 MG/ML
INJECTION, EMULSION INTRAVENOUS
Status: DISPENSED
Start: 2023-05-14

## (undated) RX ORDER — OXYTOCIN/0.9 % SODIUM CHLORIDE 30/500 ML
PLASTIC BAG, INJECTION (ML) INTRAVENOUS
Status: DISPENSED
Start: 2023-03-05

## (undated) RX ORDER — FENTANYL CITRATE 50 UG/ML
INJECTION, SOLUTION INTRAMUSCULAR; INTRAVENOUS
Status: DISPENSED
Start: 2023-05-14

## (undated) RX ORDER — SCOLOPAMINE TRANSDERMAL SYSTEM 1 MG/1
PATCH, EXTENDED RELEASE TRANSDERMAL
Status: DISPENSED
Start: 2023-05-14

## (undated) RX ORDER — KETOROLAC TROMETHAMINE 30 MG/ML
INJECTION, SOLUTION INTRAMUSCULAR; INTRAVENOUS
Status: DISPENSED
Start: 2023-04-08

## (undated) RX ORDER — LIDOCAINE HYDROCHLORIDE 10 MG/ML
INJECTION, SOLUTION INFILTRATION; PERINEURAL
Status: DISPENSED
Start: 2023-04-03

## (undated) RX ORDER — FLUMAZENIL 0.1 MG/ML
INJECTION, SOLUTION INTRAVENOUS
Status: DISPENSED
Start: 2023-03-30

## (undated) RX ORDER — CEFAZOLIN SODIUM/WATER 2 G/20 ML
SYRINGE (ML) INTRAVENOUS
Status: DISPENSED
Start: 2023-04-08

## (undated) RX ORDER — FENTANYL CITRATE 50 UG/ML
INJECTION, SOLUTION INTRAMUSCULAR; INTRAVENOUS
Status: DISPENSED
Start: 2023-03-30

## (undated) RX ORDER — PROPOFOL 10 MG/ML
INJECTION, EMULSION INTRAVENOUS
Status: DISPENSED
Start: 2023-04-08

## (undated) RX ORDER — HYDROMORPHONE HCL IN WATER/PF 6 MG/30 ML
PATIENT CONTROLLED ANALGESIA SYRINGE INTRAVENOUS
Status: DISPENSED
Start: 2023-05-14

## (undated) RX ORDER — HYDROMORPHONE HYDROCHLORIDE 1 MG/ML
INJECTION, SOLUTION INTRAMUSCULAR; INTRAVENOUS; SUBCUTANEOUS
Status: DISPENSED
Start: 2023-05-14

## (undated) RX ORDER — FENTANYL CITRATE 50 UG/ML
INJECTION, SOLUTION INTRAMUSCULAR; INTRAVENOUS
Status: DISPENSED
Start: 2023-04-08

## (undated) RX ORDER — NALOXONE HYDROCHLORIDE 0.4 MG/ML
INJECTION, SOLUTION INTRAMUSCULAR; INTRAVENOUS; SUBCUTANEOUS
Status: DISPENSED
Start: 2023-03-30

## (undated) RX ORDER — LIDOCAINE HCL/EPINEPHRINE/PF 2%-1:200K
VIAL (ML) INJECTION
Status: DISPENSED
Start: 2023-03-05

## (undated) RX ORDER — CEFAZOLIN SODIUM/WATER 2 G/20 ML
SYRINGE (ML) INTRAVENOUS
Status: DISPENSED
Start: 2023-05-14

## (undated) RX ORDER — HYDROMORPHONE HYDROCHLORIDE 1 MG/ML
INJECTION, SOLUTION INTRAMUSCULAR; INTRAVENOUS; SUBCUTANEOUS
Status: DISPENSED
Start: 2023-04-08

## (undated) RX ORDER — FENTANYL CITRATE 0.05 MG/ML
INJECTION, SOLUTION INTRAMUSCULAR; INTRAVENOUS
Status: DISPENSED
Start: 2023-05-14

## (undated) RX ORDER — LIDOCAINE HYDROCHLORIDE 10 MG/ML
INJECTION, SOLUTION INFILTRATION; PERINEURAL
Status: DISPENSED
Start: 2023-04-06

## (undated) RX ORDER — FENTANYL CITRATE 50 UG/ML
INJECTION, SOLUTION INTRAMUSCULAR; INTRAVENOUS
Status: DISPENSED
Start: 2023-04-03

## (undated) RX ORDER — GLYCOPYRROLATE 0.2 MG/ML
INJECTION, SOLUTION INTRAMUSCULAR; INTRAVENOUS
Status: DISPENSED
Start: 2023-04-08

## (undated) RX ORDER — HYDROMORPHONE HCL IN WATER/PF 6 MG/30 ML
PATIENT CONTROLLED ANALGESIA SYRINGE INTRAVENOUS
Status: DISPENSED
Start: 2023-04-08

## (undated) RX ORDER — DEXAMETHASONE SODIUM PHOSPHATE 4 MG/ML
INJECTION, SOLUTION INTRA-ARTICULAR; INTRALESIONAL; INTRAMUSCULAR; INTRAVENOUS; SOFT TISSUE
Status: DISPENSED
Start: 2023-05-14

## (undated) RX ORDER — SCOLOPAMINE TRANSDERMAL SYSTEM 1 MG/1
PATCH, EXTENDED RELEASE TRANSDERMAL
Status: DISPENSED
Start: 2023-04-08

## (undated) RX ORDER — ONDANSETRON 2 MG/ML
INJECTION INTRAMUSCULAR; INTRAVENOUS
Status: DISPENSED
Start: 2023-05-14

## (undated) RX ORDER — MORPHINE SULFATE 1 MG/ML
INJECTION, SOLUTION EPIDURAL; INTRATHECAL; INTRAVENOUS
Status: DISPENSED
Start: 2023-03-05

## (undated) RX ORDER — FENTANYL CITRATE 50 UG/ML
INJECTION, SOLUTION INTRAMUSCULAR; INTRAVENOUS
Status: DISPENSED
Start: 2023-04-06